# Patient Record
Sex: FEMALE | Race: OTHER | HISPANIC OR LATINO | Employment: OTHER | URBAN - METROPOLITAN AREA
[De-identification: names, ages, dates, MRNs, and addresses within clinical notes are randomized per-mention and may not be internally consistent; named-entity substitution may affect disease eponyms.]

---

## 2024-09-26 ENCOUNTER — APPOINTMENT (OUTPATIENT)
Dept: PRIMARY CARE | Facility: CLINIC | Age: 74
End: 2024-09-26
Payer: COMMERCIAL

## 2024-11-07 ENCOUNTER — APPOINTMENT (OUTPATIENT)
Dept: RADIOLOGY | Facility: HOSPITAL | Age: 74
End: 2024-11-07
Payer: COMMERCIAL

## 2024-11-07 ENCOUNTER — APPOINTMENT (OUTPATIENT)
Dept: CARDIOLOGY | Facility: HOSPITAL | Age: 74
End: 2024-11-07
Payer: COMMERCIAL

## 2024-11-07 ENCOUNTER — HOSPITAL ENCOUNTER (INPATIENT)
Facility: HOSPITAL | Age: 74
End: 2024-11-07
Attending: EMERGENCY MEDICINE | Admitting: INTERNAL MEDICINE
Payer: COMMERCIAL

## 2024-11-07 DIAGNOSIS — F03.90 DEMENTIA, UNSPECIFIED DEMENTIA SEVERITY, UNSPECIFIED DEMENTIA TYPE, UNSPECIFIED WHETHER BEHAVIORAL, PSYCHOTIC, OR MOOD DISTURBANCE OR ANXIETY (MULTI): ICD-10-CM

## 2024-11-07 DIAGNOSIS — E83.52 HYPERCALCEMIA: ICD-10-CM

## 2024-11-07 DIAGNOSIS — T83.511A URINARY TRACT INFECTION ASSOCIATED WITH INDWELLING URETHRAL CATHETER, INITIAL ENCOUNTER (CMS-HCC): ICD-10-CM

## 2024-11-07 DIAGNOSIS — N17.9 AKI (ACUTE KIDNEY INJURY) (CMS-HCC): Primary | ICD-10-CM

## 2024-11-07 DIAGNOSIS — C80.1 MALIGNANCY (MULTI): ICD-10-CM

## 2024-11-07 DIAGNOSIS — N39.0 URINARY TRACT INFECTION ASSOCIATED WITH INDWELLING URETHRAL CATHETER, INITIAL ENCOUNTER (CMS-HCC): ICD-10-CM

## 2024-11-07 DIAGNOSIS — Z93.6 NEPHROSTOMY PRESENT: ICD-10-CM

## 2024-11-07 DIAGNOSIS — R62.7 FAILURE TO THRIVE IN ADULT: ICD-10-CM

## 2024-11-07 LAB
25(OH)D3 SERPL-MCNC: 16 NG/ML (ref 30–100)
25(OH)D3 SERPL-MCNC: 23 NG/ML (ref 30–100)
ABO GROUP (TYPE) IN BLOOD: NORMAL
ALBUMIN SERPL BCP-MCNC: 2.5 G/DL (ref 3.4–5)
ALP SERPL-CCNC: 102 U/L (ref 33–136)
ALT SERPL W P-5'-P-CCNC: 16 U/L (ref 7–45)
AMPHETAMINES UR QL SCN: NORMAL
ANION GAP SERPL CALC-SCNC: 12 MMOL/L (ref 10–20)
ANTIBODY SCREEN: NORMAL
APPEARANCE UR: ABNORMAL
AST SERPL W P-5'-P-CCNC: 26 U/L (ref 9–39)
BACTERIA #/AREA URNS AUTO: ABNORMAL /HPF
BARBITURATES UR QL SCN: NORMAL
BASOPHILS # BLD AUTO: 0.08 X10*3/UL (ref 0–0.1)
BASOPHILS NFR BLD AUTO: 0.4 %
BENZODIAZ UR QL SCN: NORMAL
BILIRUB SERPL-MCNC: 0.2 MG/DL (ref 0–1.2)
BILIRUB UR STRIP.AUTO-MCNC: NEGATIVE MG/DL
BUN SERPL-MCNC: 65 MG/DL (ref 6–23)
BZE UR QL SCN: NORMAL
CA-I BLD-SCNC: 1.82 MMOL/L (ref 1.1–1.33)
CALCIUM SERPL-MCNC: 13.6 MG/DL (ref 8.6–10.6)
CANNABINOIDS UR QL SCN: NORMAL
CHLORIDE SERPL-SCNC: 102 MMOL/L (ref 98–107)
CHLORIDE UR-SCNC: 25 MMOL/L
CHLORIDE/CREATININE (MMOL/G) IN URINE: 64 MMOL/G CREAT (ref 38–318)
CO2 SERPL-SCNC: 29 MMOL/L (ref 21–32)
COLOR UR: ABNORMAL
CREAT SERPL-MCNC: 3.02 MG/DL (ref 0.5–1.05)
CREAT UR-MCNC: 39.3 MG/DL (ref 20–320)
EGFRCR SERPLBLD CKD-EPI 2021: 16 ML/MIN/1.73M*2
EOSINOPHIL # BLD AUTO: 0.73 X10*3/UL (ref 0–0.4)
EOSINOPHIL NFR BLD AUTO: 3.5 %
ERYTHROCYTE [DISTWIDTH] IN BLOOD BY AUTOMATED COUNT: 16.3 % (ref 11.5–14.5)
FENTANYL+NORFENTANYL UR QL SCN: NORMAL
GLUCOSE SERPL-MCNC: 162 MG/DL (ref 74–99)
GLUCOSE UR STRIP.AUTO-MCNC: NORMAL MG/DL
HCT VFR BLD AUTO: 29.1 % (ref 36–46)
HGB BLD-MCNC: 9.5 G/DL (ref 12–16)
HIV 1+2 AB+HIV1 P24 AG SERPL QL IA: NONREACTIVE
IMM GRANULOCYTES # BLD AUTO: 0.12 X10*3/UL (ref 0–0.5)
IMM GRANULOCYTES NFR BLD AUTO: 0.6 % (ref 0–0.9)
KETONES UR STRIP.AUTO-MCNC: NEGATIVE MG/DL
LEUKOCYTE ESTERASE UR QL STRIP.AUTO: ABNORMAL
LYMPHOCYTES # BLD AUTO: 3.35 X10*3/UL (ref 0.8–3)
LYMPHOCYTES NFR BLD AUTO: 16 %
MCH RBC QN AUTO: 31.1 PG (ref 26–34)
MCHC RBC AUTO-ENTMCNC: 32.6 G/DL (ref 32–36)
MCV RBC AUTO: 95 FL (ref 80–100)
METHADONE UR QL SCN: NORMAL
MONOCYTES # BLD AUTO: 1.62 X10*3/UL (ref 0.05–0.8)
MONOCYTES NFR BLD AUTO: 7.7 %
MUCOUS THREADS #/AREA URNS AUTO: ABNORMAL /LPF
NEUTROPHILS # BLD AUTO: 15.03 X10*3/UL (ref 1.6–5.5)
NEUTROPHILS NFR BLD AUTO: 71.8 %
NITRITE UR QL STRIP.AUTO: NEGATIVE
NRBC BLD-RTO: 0 /100 WBCS (ref 0–0)
OPIATES UR QL SCN: NORMAL
OXYCODONE+OXYMORPHONE UR QL SCN: NORMAL
PCP UR QL SCN: NORMAL
PH UR STRIP.AUTO: 7 [PH]
PLATELET # BLD AUTO: 336 X10*3/UL (ref 150–450)
POTASSIUM SERPL-SCNC: 4.6 MMOL/L (ref 3.5–5.3)
POTASSIUM UR-SCNC: 28 MMOL/L
POTASSIUM/CREAT UR-RTO: 71 MMOL/G CREAT
PROT SERPL-MCNC: 6.4 G/DL (ref 6.4–8.2)
PROT UR STRIP.AUTO-MCNC: ABNORMAL MG/DL
RBC # BLD AUTO: 3.05 X10*6/UL (ref 4–5.2)
RBC # UR STRIP.AUTO: ABNORMAL /UL
RBC #/AREA URNS AUTO: >20 /HPF
RH FACTOR (ANTIGEN D): NORMAL
SODIUM SERPL-SCNC: 138 MMOL/L (ref 136–145)
SODIUM UR-SCNC: 29 MMOL/L
SODIUM/CREAT UR-RTO: 74 MMOL/G CREAT
SP GR UR STRIP.AUTO: 1.01
SQUAMOUS #/AREA URNS AUTO: ABNORMAL /HPF
T4 FREE SERPL-MCNC: 1.3 NG/DL (ref 0.78–1.48)
TREPONEMA PALLIDUM IGG+IGM AB [PRESENCE] IN SERUM OR PLASMA BY IMMUNOASSAY: NONREACTIVE
TSH SERPL-ACNC: 5.84 MIU/L (ref 0.44–3.98)
UROBILINOGEN UR STRIP.AUTO-MCNC: NORMAL MG/DL
VIT B12 SERPL-MCNC: 425 PG/ML (ref 211–911)
WBC # BLD AUTO: 20.9 X10*3/UL (ref 4.4–11.3)
WBC #/AREA URNS AUTO: >50 /HPF
WBC CLUMPS #/AREA URNS AUTO: ABNORMAL /HPF

## 2024-11-07 PROCEDURE — 74176 CT ABD & PELVIS W/O CONTRAST: CPT

## 2024-11-07 PROCEDURE — 1210000001 HC SEMI-PRIVATE ROOM DAILY

## 2024-11-07 PROCEDURE — 82436 ASSAY OF URINE CHLORIDE: CPT

## 2024-11-07 PROCEDURE — 82306 VITAMIN D 25 HYDROXY: CPT | Performed by: NUTRITIONIST

## 2024-11-07 PROCEDURE — 93005 ELECTROCARDIOGRAM TRACING: CPT

## 2024-11-07 PROCEDURE — 93010 ELECTROCARDIOGRAM REPORT: CPT | Performed by: EMERGENCY MEDICINE

## 2024-11-07 PROCEDURE — 51702 INSERT TEMP BLADDER CATH: CPT

## 2024-11-07 PROCEDURE — 82306 VITAMIN D 25 HYDROXY: CPT

## 2024-11-07 PROCEDURE — 82397 CHEMILUMINESCENT ASSAY: CPT

## 2024-11-07 PROCEDURE — 2500000004 HC RX 250 GENERAL PHARMACY W/ HCPCS (ALT 636 FOR OP/ED)

## 2024-11-07 PROCEDURE — 99285 EMERGENCY DEPT VISIT HI MDM: CPT | Mod: 25

## 2024-11-07 PROCEDURE — 72125 CT NECK SPINE W/O DYE: CPT

## 2024-11-07 PROCEDURE — 70450 CT HEAD/BRAIN W/O DYE: CPT

## 2024-11-07 PROCEDURE — 2500000004 HC RX 250 GENERAL PHARMACY W/ HCPCS (ALT 636 FOR OP/ED): Performed by: NUTRITIONIST

## 2024-11-07 PROCEDURE — 36415 COLL VENOUS BLD VENIPUNCTURE: CPT

## 2024-11-07 PROCEDURE — 72125 CT NECK SPINE W/O DYE: CPT | Performed by: RADIOLOGY

## 2024-11-07 PROCEDURE — 96360 HYDRATION IV INFUSION INIT: CPT | Mod: 59

## 2024-11-07 PROCEDURE — 87389 HIV-1 AG W/HIV-1&-2 AB AG IA: CPT | Performed by: NUTRITIONIST

## 2024-11-07 PROCEDURE — 84443 ASSAY THYROID STIM HORMONE: CPT | Performed by: NUTRITIONIST

## 2024-11-07 PROCEDURE — 82607 VITAMIN B-12: CPT | Performed by: NUTRITIONIST

## 2024-11-07 PROCEDURE — 84439 ASSAY OF FREE THYROXINE: CPT | Performed by: NUTRITIONIST

## 2024-11-07 PROCEDURE — 81001 URINALYSIS AUTO W/SCOPE: CPT

## 2024-11-07 PROCEDURE — 84075 ASSAY ALKALINE PHOSPHATASE: CPT

## 2024-11-07 PROCEDURE — 86780 TREPONEMA PALLIDUM: CPT | Performed by: NUTRITIONIST

## 2024-11-07 PROCEDURE — 74176 CT ABD & PELVIS W/O CONTRAST: CPT | Performed by: RADIOLOGY

## 2024-11-07 PROCEDURE — 87086 URINE CULTURE/COLONY COUNT: CPT

## 2024-11-07 PROCEDURE — 85025 COMPLETE CBC W/AUTO DIFF WBC: CPT

## 2024-11-07 PROCEDURE — 96361 HYDRATE IV INFUSION ADD-ON: CPT | Mod: 59

## 2024-11-07 PROCEDURE — 80307 DRUG TEST PRSMV CHEM ANLYZR: CPT | Performed by: NUTRITIONIST

## 2024-11-07 PROCEDURE — 86901 BLOOD TYPING SEROLOGIC RH(D): CPT

## 2024-11-07 PROCEDURE — 36415 COLL VENOUS BLD VENIPUNCTURE: CPT | Performed by: EMERGENCY MEDICINE

## 2024-11-07 PROCEDURE — 70450 CT HEAD/BRAIN W/O DYE: CPT | Performed by: RADIOLOGY

## 2024-11-07 PROCEDURE — 84134 ASSAY OF PREALBUMIN: CPT | Performed by: NUTRITIONIST

## 2024-11-07 PROCEDURE — 87040 BLOOD CULTURE FOR BACTERIA: CPT | Performed by: EMERGENCY MEDICINE

## 2024-11-07 PROCEDURE — 76775 US EXAM ABDO BACK WALL LIM: CPT | Performed by: EMERGENCY MEDICINE

## 2024-11-07 PROCEDURE — 99285 EMERGENCY DEPT VISIT HI MDM: CPT | Performed by: EMERGENCY MEDICINE

## 2024-11-07 PROCEDURE — 76857 US EXAM PELVIC LIMITED: CPT

## 2024-11-07 PROCEDURE — 82330 ASSAY OF CALCIUM: CPT

## 2024-11-07 PROCEDURE — 76857 US EXAM PELVIC LIMITED: CPT | Performed by: EMERGENCY MEDICINE

## 2024-11-07 PROCEDURE — 82746 ASSAY OF FOLIC ACID SERUM: CPT | Performed by: NUTRITIONIST

## 2024-11-07 RX ORDER — PANTOPRAZOLE SODIUM 40 MG/1
40 TABLET, DELAYED RELEASE ORAL DAILY
Status: ON HOLD | COMMUNITY

## 2024-11-07 RX ORDER — ACETAMINOPHEN 325 MG/1
650 TABLET ORAL EVERY 4 HOURS PRN
Status: ON HOLD | COMMUNITY

## 2024-11-07 RX ORDER — BISACODYL 10 MG/1
10 SUPPOSITORY RECTAL DAILY PRN
Status: ACTIVE | OUTPATIENT
Start: 2024-11-07

## 2024-11-07 RX ORDER — TROSPIUM CHLORIDE 20 MG/1
20 TABLET, FILM COATED ORAL DAILY
Status: ON HOLD | COMMUNITY

## 2024-11-07 RX ORDER — CIPROFLOXACIN 500 MG/1
500 TABLET ORAL EVERY 12 HOURS
Status: ON HOLD | COMMUNITY
Start: 2024-11-04 | End: 2024-11-11

## 2024-11-07 RX ORDER — POLYETHYLENE GLYCOL 3350 17 G/17G
17 POWDER, FOR SOLUTION ORAL DAILY PRN
Status: ACTIVE | OUTPATIENT
Start: 2024-11-07

## 2024-11-07 RX ORDER — METOPROLOL TARTRATE 25 MG/1
25 TABLET, FILM COATED ORAL EVERY 12 HOURS
Status: ON HOLD | COMMUNITY

## 2024-11-07 RX ORDER — SODIUM CHLORIDE 9 MG/ML
150 INJECTION, SOLUTION INTRAVENOUS CONTINUOUS
Status: ACTIVE | OUTPATIENT
Start: 2024-11-07 | End: 2024-11-08

## 2024-11-07 RX ORDER — DOCUSATE SODIUM 100 MG/1
100 CAPSULE, LIQUID FILLED ORAL 2 TIMES DAILY
Status: ON HOLD | COMMUNITY

## 2024-11-07 RX ORDER — MIRTAZAPINE 7.5 MG/1
7.5 TABLET, FILM COATED ORAL DAILY
Status: ON HOLD | COMMUNITY

## 2024-11-07 RX ORDER — CYANOCOBALAMIN (VITAMIN B-12) 500 MCG
1 TABLET ORAL NIGHTLY
Status: ON HOLD | COMMUNITY

## 2024-11-07 RX ORDER — CEFTRIAXONE 1 G/50ML
1 INJECTION, SOLUTION INTRAVENOUS ONCE
Status: COMPLETED | OUTPATIENT
Start: 2024-11-07 | End: 2024-11-07

## 2024-11-07 RX ORDER — HEPARIN SODIUM 5000 [USP'U]/ML
5000 INJECTION, SOLUTION INTRAVENOUS; SUBCUTANEOUS EVERY 8 HOURS
Status: DISPENSED | OUTPATIENT
Start: 2024-11-07

## 2024-11-07 RX ORDER — CEFTRIAXONE 1 G/50ML
1 INJECTION, SOLUTION INTRAVENOUS EVERY 24 HOURS
Status: DISCONTINUED | OUTPATIENT
Start: 2024-11-08 | End: 2024-11-08

## 2024-11-07 ASSESSMENT — LIFESTYLE VARIABLES
HAVE PEOPLE ANNOYED YOU BY CRITICIZING YOUR DRINKING: NO
HAVE YOU EVER FELT YOU SHOULD CUT DOWN ON YOUR DRINKING: NO
EVER HAD A DRINK FIRST THING IN THE MORNING TO STEADY YOUR NERVES TO GET RID OF A HANGOVER: NO
TOTAL SCORE: 0
EVER FELT BAD OR GUILTY ABOUT YOUR DRINKING: NO

## 2024-11-07 ASSESSMENT — ENCOUNTER SYMPTOMS: CHILLS: 1

## 2024-11-07 ASSESSMENT — COLUMBIA-SUICIDE SEVERITY RATING SCALE - C-SSRS
6. HAVE YOU EVER DONE ANYTHING, STARTED TO DO ANYTHING, OR PREPARED TO DO ANYTHING TO END YOUR LIFE?: NO
2. HAVE YOU ACTUALLY HAD ANY THOUGHTS OF KILLING YOURSELF?: NO
1. IN THE PAST MONTH, HAVE YOU WISHED YOU WERE DEAD OR WISHED YOU COULD GO TO SLEEP AND NOT WAKE UP?: NO

## 2024-11-07 ASSESSMENT — PAIN - FUNCTIONAL ASSESSMENT: PAIN_FUNCTIONAL_ASSESSMENT: UNABLE TO SELF-REPORT

## 2024-11-07 NOTE — ED PROVIDER NOTES
HPI   Chief Complaint   Patient presents with    Fall       Patient is a 74-year-old female with past medical history of cognitive decline, recurrent falls, walking difficulty, esophagitis, diaphragmatic hernia, prior hydronephrosis, prior FADIA presenting from [facility] with concern for ground-level fall with head strike.  Witnessed by nursing staff.  Patient unable to contribute significantly to history but does answer some questions with nods and able to tell me her name.  Per nurse and nursing facility this is at baseline.  Reports that at baseline she usually can tell you her name but is confused about most other things.  Nursing facility does not report any other concerns.  Report that patient is not on any blood thinners.            Patient History   No past medical history on file.  Past Surgical History:   Procedure Laterality Date    CHOLECYSTECTOMY  06/14/2016    Cholecystectomy Laparoscopic    OOPHORECTOMY  06/14/2016    Oophorectomy - Unilateral (Removal Of One Ovary)     No family history on file.  Social History     Tobacco Use    Smoking status: Not on file    Smokeless tobacco: Not on file   Substance Use Topics    Alcohol use: Not on file    Drug use: Not on file       Physical Exam   ED Triage Vitals [11/07/24 1206]   Temperature Heart Rate Respirations BP   36.2 °C (97.2 °F) 82 16 113/74      SpO2 Temp src Heart Rate Source Patient Position   -- -- -- Sitting      BP Location FiO2 (%)     Right arm --       Physical Exam  Constitutional:       Appearance: Normal appearance.   HENT:      Head: Normocephalic and atraumatic.   Eyes:      Extraocular Movements: Extraocular movements intact.   Cardiovascular:      Rate and Rhythm: Normal rate.   Pulmonary:      Effort: Pulmonary effort is normal.   Abdominal:      Comments: PEG tube in place, soft, nondistended, no tenderness to palpation.   Genitourinary:     Comments: Cloudy urine from Calvillo catheter.  Musculoskeletal:         General: Normal  range of motion.      Cervical back: Normal range of motion.   Skin:     General: Skin is warm and dry.   Neurological:      Mental Status: She is alert.      Comments: Eyes track.  Follows commands when asked to give thumbs up and wiggle toes.  Responds to stimuli in upper and lower extremities.  Alert and oriented to name.  Unable to coherently understand what patient says for location.  Unable to give history of recent events.  Does nod when asked if she fell but somewhat inconsistently answers other questions with nods.   Psychiatric:         Mood and Affect: Mood normal.         Behavior: Behavior normal.           ED Course & [facility]   ED Course as of 11/08/24 0847   Thu Nov 07, 2024   1721 External records reviewed including notes from [facility] on 10/8/2024.  Placement of right sided percutaneous nephrostomy for obstructive uropathy. []   2020 Electrocardiogram, 12-lead PRN ACS symptoms  Interpreted by me.  11/7/2024 at 2002.  Sinus rhythm 83 bpm.  , QRS 80, QTc 413.  No ST elevation. []      ED Course User Index  [MC] Bertrand Gibson MD         Diagnoses as of 11/08/24 0847   FADIA (acute kidney injury) (CMS-HCC)   Urinary tract infection associated with indwelling urethral catheter, initial encounter (CMS-HCC)   Hypercalcemia                 No data recorded     Parish Coma Scale Score: 14 (11/07/24 1207 : Rosa Knight, MARIIA)                           Medical Decision Making  Patient is a 74-year-old female with past medical history of cognitive decline, recurrent falls, walking difficulty, esophagitis, diaphragmatic hernia, prior hydronephrosis, prior FADIA presenting from SNF with concern for ground-level fall with head strike.  Mental status from nursing home reportedly near baseline but is charted previously as being alert and oriented x 3 which is markedly better than presentation today.  Did improved to alert and oriented x 2 on attending evaluation but was significantly lethargic and confused.  CT  head and C-spine obtained with no evidence of acute injury.  Patient otherwise with marked dehydrated appearance as well as cloudy appearance of Calvillo catheter. IV fluids felt warranted given dehydrated appearance and UA sent.  Labs most notable for marked FADIA and leukocytosis suspected secondary to dehydration and UTI.  Did have significant clots and poor urine output after placement of Calvillo.  Point-of-care ultrasound notable for bilateral hydronephrosis.  Case discussed with medicine and patient admitted for further management of FADIA, UTI, and hydronephrosis.      Patient seen and discussed with Dr. Yonas Anand MD, PhD  Emergency Medicine PGY3          Procedure  Procedures     Sharath Anand MD  Resident  11/08/24 9938

## 2024-11-07 NOTE — CONSULTS
Reason For Consult  FADIA with BL hydroureteronephrosis R>L s/p R PCNL placed 9/2024 at [facility].     History Of Present Illness  Wanda Booth is a 74 y.o. female presenting from facility for a trauma evaluation after a fall from ground level and her work up revealed FADIA (cr 3 from 0.8 BL in 2022) very thick bladder wall with pelvic lymphadenopathy (1.3-1.6cm) and BL hydroureteronephrosis R>L despite her current PCNT that was in place on imaging for which urology service was consulted.    She presented to [facility] in 9/2024 with FTT and was admitted to ICU for pancreatitis, esophagitis on endoscopy s/p PEG tube and FADIA. Her CT at the time with similar findings except for L side hydroureteronephrosis, it was reported mild. Had carreon placement with persistent hydro on US afterward s/p R PCNT. She was discharged to SNF with outpatient follow up with [facility] urology 11/12 for cystoscopy.     She was not able to follow conversation and was sleepy. She was not able to answer any questions. Her R PCNT was not draining at the time and it start draining after flushing the tube gently.      Past Medical History  She has no past medical history on file.    Surgical History  She has a past surgical history that includes Oophorectomy (06/14/2016) and Cholecystectomy (06/14/2016).     Social History  She has no history on file for tobacco use, alcohol use, and drug use.    Family History  No family history on file.     Allergies  Patient has no known allergies.    Review of Systems  Reviewed       Physical Exam  General: in no acute distress, non-toxic appearing   : carreon in place draining light pink urine  Respiratory: non labored breathing   Cardiovascular: regular rate per chart  Abdomen: soft, non-distended, non specific tenderness. Non draining R PCNT    Extremities: no cyanosis, clubbing or edema   Skin: no obvious lesions or rashes   Psych: appropriate mood and behavior       Last Recorded Vitals  Blood pressure (!) 140/91, pulse 98,  "temperature 36.7 °C (98.1 °F), resp. rate 16, height 1.473 m (4' 10\"), weight 72.6 kg (160 lb), SpO2 99%.    Relevant Results  CT abdomen pelvis wo IV contrast  11/7/2024   1. Severe right and moderate left hydroureteronephrosis.   2. Right percutaneous nephrostomy tube is in expected positioning with pigtail in the right renal pelvis without evidence of discontinuity or kinking.  3. Marked hazy thickening of the urinary bladder wall with extensive perivesical nodularity highly suspicious for malignancy with suspected extramural extension. Alternatively, chronic severe inflammatory process can also be considered.  3. Few solid and few cavitary nodules throughout the bilateral lung bases may relate to malignancy or atypical infectious process.  4. Diffuse haziness of the pancreas without collection, mass, or ductal dilatation, which may relate to benign edema, however correlation with lipase levels is recommended if there is concern for acute pancreatitis.  5. Multiple enlarged pelvic and lower paraesophageal lymph nodes are presumed metastatic or less likely reactive.  6. Small left and trace right pleural effusions.  7. PEG tube in place with bumper in the gastric body.    Assessment/Plan   Wanda Booth is a 74 y.o. female presenting from facility for a trauma evaluation after a fall from ground level and her work up revealed FADIA (cr 3 from 0.8 BL in 2022) very thick bladder wall with pelvic lymphadenopathy (1.3-1.6cm) and BL hydroureteronephrosis R>L despite her current PCNT (placed 9/2024) that was in place on imaging for which urology service was consulted.    Her R PCNT was flushed and start draining likely it was clogged. UA suspicious for UTI.    She had hypercalcemia in September with elevated PTH Related Peptide. Her hypercalcemia was resolved before discharge. She is now hypercalcemic.    Recommendations:  - Maintain R PCNT and carreon catheter  - Please ensure her carreon catheter was recently exchanged or " replace her carreon  - Agree with abx and follow up UC  - Trend cr and UOP  - If cr is not improving, would recommend IR L PCNT placement with possible pelvic LN biopsy at the time.  - Would recommend CTU (with clamped PCNT) for upper tract evaluation once renal function improve or MRU.  - Send urine cytology  - Urology will follow     Discussed with chief resident Dr. Grant and attending Dr. Deirdre Barrientos MD  Urology PGY-3  Pager: [phone]

## 2024-11-07 NOTE — H&P
"History Of Present Illness  Wanda Booth is a 74 y.o. female PMHx b/l hydronephrosis (w/ r nephrostomy tube), chronic carreon, PEG, recently tx for FADIA/sepsis and completed course of cipro presenting to ED 11/7 with fall and concern for UTI. Pt came from facility.    In ED: pt found to have FADIA with cr 3.02, elevated BUN65, hyperCa of 13.6. Pt given 2L bolus IVF. Concern for urinary retention after carreon replaced 11/7(reported \"looking infected\") so bedside ultrasound done moderate bilateral hydro, therefore pending CT non-con. R sided nephrostomy tube with limited urine draining. UA and cx ordered/blood cx collected and pt started on CTX.     Labs/Imaging: LFTs wnl, Glucose 162 (A1c 9/2024 5.6), CTHead and CTcervical spine wnl no acute findings    Hx: recent admission to [facility] facility 9/27-10/10 for sepsis and sevre FADIA, found to have b/l hydronephrosis then and had a R nephrostomy tube placed. Discharged 10/10 after resolution. Pt on ciprofloxacin at her facility per ED.  Ca 12.4 PTH 17 CKD4 based on Cystatin C of 1.98, baseline Cr 1.1 Pre-alb 9     Social: Pt is  to  [place], pt does not have capacity so will need to contact nok to confirm code status, for now full code.       Past Medical History  She has no past medical history on file.    Surgical History  She has a past surgical history that includes Oophorectomy (06/14/2016) and Cholecystectomy (06/14/2016).      Family History  No family history on file.     Allergies  Patient has no known allergies.    Review of Systems   Constitutional:  Positive for chills.        Pt complaining of being very cold during exam and not wanting to lift blankets    Gastrointestinal:         Endorsing pain        Physical Exam  Constitutional:       Comments: Pt appearing cachetic and disheveled. Unable to answer most questions. Pt shivering throughout.    HENT:      Head: Normocephalic.      Comments: Laceration right temporal forehead with dried blood      " Mouth/Throat:      Mouth: Mucous membranes are dry.      Comments: Tongue with light brown and surrounding dark brown plaque. Few teeth only on the bottom row.   Eyes:      General: No scleral icterus.        Right eye: No discharge.         Left eye: No discharge.      Comments: Eyes closed for most of exam    Cardiovascular:      Rate and Rhythm: Normal rate and regular rhythm.      Heart sounds: No murmur heard.     No friction rub. No gallop.   Pulmonary:      Effort: No respiratory distress.      Breath sounds: No wheezing, rhonchi or rales.   Chest:      Chest wall: No tenderness.   Abdominal:      General: There is no distension.      Palpations: Abdomen is soft. There is no mass.      Tenderness: There is abdominal tenderness. There is guarding.      Comments: PEG   Musculoskeletal:         General: No tenderness or signs of injury.      Right lower leg: No edema.      Left lower leg: No edema.   Skin:     General: Skin is dry.      Coloration: Skin is not jaundiced.      Findings: No rash.   Neurological:      Mental Status: She is disoriented.      Comments: Alert and oriented to person only   Psychiatric:      Comments: Pt with no capacity           Last Recorded Vitals  /58   Pulse 81   Temp 36.4 °C (97.5 °F)   Resp 14   Wt 72.6 kg (160 lb)   SpO2 95%     Scheduled medications  cefTRIAXone, 1 g, intravenous, Once  heparin (porcine), 5,000 Units, subcutaneous, q8h      Continuous medications  sodium chloride 0.9%, 100 mL/hr      PRN medications  PRN medications: polyethylene glycol       Assessment/Plan:  Ms. Wanda Booth is a 73 yo woman with PMHx b/l hydronephrosis (w/ r nephrostomy tube), chronic carreon, PEG, recently tx for FADIA/sepsis at [facility] and completed course of cipro presenting to  ED 11/7 for fall and concern for UTI. Pt came from facility. In the ED pt found to have FADIA and hypercalcemia currently tx with fluids. Carreon replaced in ED and with concern for UTI blood cx and UA with cx  ordered. Concern for limited drainage from neph so urology consulted and pending CTAP non-con. Pt with AMS unsure if this is chronic or acute, ordered labs for further workup through CTH normal and CT cervical spine also wnl after fall. Pt started on CTX 11/7-xx. Will monitor for improvement in cr and ca with fluids and consider calcitonin or zoledronic acid.       #AMS vs #Hypercalcemic Encephalopathy   #FADIA  -Cr 3.02 baseline 1.1 and elevated BUN 65 in ED  #Hypercalcemia  -Ca of 13.6 in ED  #Chronic Carreon  #r Nephrostomy Tube  #B/L Hydronephrosis   -bedside ultrasound showed b/l hydro in ED 11/7  -Labs prior to admission Ca 12.4 PTH 17 CKD4 based on Cystatin C of 1.98, baseline Cr 1.1 Pre-alb 9   -received 2L NS in ED  Plan:  -Started normal saline at 100 mL/hr  -Consulted Urology    -pending ionized ca, pth, pthrp consider zolendronic acid and calcitonin, urine electrolytes  -pending utox, UTI workup below, HIV. Syph. TSH with reflex, b12  -vit d low 16  -pending CTAP non-con    #Concern for UTI  #Leukocytosis   -carreon changed in ED 11/7  Plan:  -UA and cx, blood cx collected 11/7  -wbc 20.9  -On CTX 11/7-xx    #PEG  #Concern for malnutrition/neglect  #Poor dentition  #Normocytic Anemia  Plan:  -hgb 9.5, mcv 95  -dietician consulted, hold on starting iron iso infection ears malnourished, appreciate recs from dietician for tube feeds     #Unspecified Fall  -CTHead and CTcervical spine wnl no acute findings  Plan:  -ctm      F NS 100ml/hr  E prn  N NPO and nothing via tube  A PIV, PEG, Carreon, Neph tube   DVT ppx subcutaneous hep  Bowel reg miralax prn    Code Status: FULL CODE (pt with/o capacity pending conversation with NOK)  TANK Booth () home  (need to establish contact)       Mariana Robertson MD

## 2024-11-07 NOTE — ED TRIAGE NOTES
According to EMS, patient fell at facility where she lives. Patient answers questions with nodding yes/no, unable to verbally recount what happened. Does nod when asked if she fell.

## 2024-11-07 NOTE — SIGNIFICANT EVENT
SENIOR STAFFING NOTE    Please refer to excellent PGY-1 history and physical note for further details.    Wanda Booth is a 74 female presenting after a fall from her nursing facility. Found to have significant FADIA and c/f UTI, for which she is admitted.    She has about a 2 year gap in her medical history from 2022 until 9/2024 at which time she was admitted to a [facility] facility for sepsis 2/2 UTI, FADIA, pancreatitis, and hypercalcemia. During this admission, she was hypercalcemic to 12.3 PTH 17, PTHrP 35.2, suspected malignancy especially iso cystitis though unclear extent of workup. She was treated with IVF and her Ca on discharge was 10.3. Regarding her FADIA, Cr on admission 2.14, discharged at 1.29. Nephrology was consulted and given the lack of medical history/data, FADIA vs CKD unclear. Cystatin C 1.98, dx with CKD4, felt to have a Cr baseline of 1.1. She was also found to have cystitis with b/l hydronephrosis, and underwent R sided nephrostomy tube placement, unclear regarding discussion of unilateral vs bilateral nephrostomy tubes. Also diagnosed with failure to thrive during this admission, Pre-albumin of 9 and underwent PEG placement. She was discharged with R PCNT and carreon with Urology follow up for cystoscopy.      Objective data:  On presentation, afebrile and HDS. Labs notable for leukocytosis 20.9, anemia 9.5 MCV 95, BUN/Cr 65/3.02, Ca 13.6 (corrected for albumin 14.8). CTH and CT C spine unremarkable for acute abnormalities. Carreon exchanged in the ED, started on CTX for empiric UTI coverage. POCUS by ED with b/l hydronephrosis, decompressed bladder. CT A/P ordered and admitted to medicine.     Constitutional: No acute distress, resting comfortably in bed, malnurished, dry  Cardiovascular: RRR, normal S1/S2, no murmurs noted  Pulmonary: Clear to auscultation b/l, poor inspiratory effort  GI: Soft, generalized tenderness to palpation, PEG tube in place  : Carreon draining pink tinged urine, R PCNT in  place  Lower extremities: Warm and well perfused, no lower extremity edema  Neuro: A&O to person only. Unable to maintain concentration to follow commands intermittently during conversation  Psych: Appropriate mood and affect    Assessment and Plan:  Currently presenting with what appears to be acute worsening of her previous presentation. She has a more severe FADIA on CKD4 iso b/l hydronephrosis with c/f malfunctioning R sided nephrostomy tube. Her hypercalcemia is more severe as well, 14.8 on presentation.     In the midst of her significant hypercalcemia, renal disease, malnutrition, and c/f underlying malignancy, she is at what appears to be baseline for her with being oriented only to person.       #FADIA on CKD4 (bl Cr 1.1)  #Subacute cystitis  #C/f UTI, leukocytosis  - She has significant FADIA on CKD iso severe b/l hydronephrosis s/p R PCNT 10/3/24. Now found to again have b/l hydronephrosis and c/f R PCNT malfunction  [] Urology consulted regarding b/l hydronephrosis and FADIA and c/f R PCNT malfunction. Would potentially need IR exchanged PCNT however need to determine whether solely exchange vs R PCNT exchange and L PCNT placement would be indicated, appreciate recs  [] UA, urine lytes  [] CTX vs Zosyn vs Rizwana for ESBL coverage   [] Fu CT A/P from ED    #Hypercalcemia  #Encephalopathy, ?underlying dementia  - Currently at her baseline mental status per chart review, A&Ox1, not following commands well. As there is concern she has underlying malignancy, and with Ca 14.8, PTH 17, PTHrP 35.2, in the setting of no medical data since 2022, question whether this is longstanding hypercalcemia leading to hypercalcemic encephalopathy. Alternatively, was recently admitted for sepsis 2/2 UTI and is at high risk for recurrent infections which could certainly lead to encephalopathy, though with unclear baseline past 9/2024 difficult to tell.   [] s/p 2L NS in ED, will continue with NS at 100cc/hr, reeval in AM  [] Will  consider zolendronic acid and calcitonin in AM pending repeat Ca  [] PTH, PTHrP, Vit D  [] Will need collateral information, at this time patient also does not demonstrate capacity to determine code status or medical care    #Failure to thrive  #Recurrent falls  - Malnourished, prealbumin 9/2024 9.0, now s/p PEG   - C/f underlying malignancy  - Falls likely 2/2 hypovolemia iso poor PO intake, potential sepsis  [] Nutrition consult  [] NPO for now given aspiration risk  [] Prealbumin      Fluids: NS at 100cc/hr  Electrolytes: K>4, Mg>2  Nutrition: NPO, c/f aspiration risk  Access: PIV  DVT ppx: SCDs, chem ppx on hold for potential procedures  GI ppx:  None indicated  Code status:  Will place as full code, however at this time patient does not have capacity to determine code status and proxy unable to be reached  NOK: Hema () [phone]       Patient to be staffed with attending in the AM.    Bertrnad Holbrook MD  Internal Medicine, PGY-2

## 2024-11-07 NOTE — ED PROCEDURE NOTE
Procedure    Performed by: Matt Mathur DO  Authorized by: Bertrand Gibson MD        Genitourinary Indications: retention of urine and hematuria          Procedure: Renal Ultrasound    Findings:  Right Kidney: The RIGHT kidney was visualized and was positive for HYDRONEPHROSIS.  Left Kidney: The LEFT kidney was visualized and was positive for HYDRONEPHROSIS.  Bladder: The bladder was visualized and was DECOMPRESSED. and Catheter in place    Impression:  Renal: abnormal renal ultrasound  Procedure: Bladder Ultrasound    Findings:  Bladder Visualization: The bladder was visualized and was DECOMPRESSED.    Impression:  Bladder: The bladder was DECOMPRESSED.    Comments: Calvillo catheter balloon in bladder. Percutaneous nephrostomy in right renal pelvis.      Matt Mathur DO  Resident  11/07/24 0443      I was present during all critical and key portions of the procedure(s) and immediately available to furnish services the entire duration.  See resident/ANTWON note for details.       Bertrand Gibson MD  11/07/24 1601

## 2024-11-08 ENCOUNTER — APPOINTMENT (OUTPATIENT)
Dept: RADIOLOGY | Facility: HOSPITAL | Age: 74
End: 2024-11-08
Payer: COMMERCIAL

## 2024-11-08 ENCOUNTER — APPOINTMENT (OUTPATIENT)
Dept: CARDIOLOGY | Facility: HOSPITAL | Age: 74
End: 2024-11-08
Payer: COMMERCIAL

## 2024-11-08 LAB
ALBUMIN SERPL BCP-MCNC: 2.3 G/DL (ref 3.4–5)
ALBUMIN SERPL BCP-MCNC: 2.5 G/DL (ref 3.4–5)
ALP SERPL-CCNC: 79 U/L (ref 33–136)
ALT SERPL W P-5'-P-CCNC: 13 U/L (ref 7–45)
ANION GAP SERPL CALC-SCNC: 12 MMOL/L (ref 10–20)
ANION GAP SERPL CALC-SCNC: 15 MMOL/L (ref 10–20)
AST SERPL W P-5'-P-CCNC: 21 U/L (ref 9–39)
ATRIAL RATE: 83 BPM
BASOPHILS # BLD AUTO: 0.13 X10*3/UL (ref 0–0.1)
BASOPHILS NFR BLD AUTO: 0.7 %
BILIRUB DIRECT SERPL-MCNC: 0.2 MG/DL (ref 0–0.3)
BILIRUB SERPL-MCNC: 0.2 MG/DL (ref 0–1.2)
BUN SERPL-MCNC: 48 MG/DL (ref 6–23)
BUN SERPL-MCNC: 53 MG/DL (ref 6–23)
CALCIUM SERPL-MCNC: 12.8 MG/DL (ref 8.6–10.6)
CALCIUM SERPL-MCNC: 12.9 MG/DL (ref 8.6–10.6)
CHLORIDE SERPL-SCNC: 115 MMOL/L (ref 98–107)
CHLORIDE SERPL-SCNC: 118 MMOL/L (ref 98–107)
CO2 SERPL-SCNC: 19 MMOL/L (ref 21–32)
CO2 SERPL-SCNC: 22 MMOL/L (ref 21–32)
CREAT SERPL-MCNC: 2.53 MG/DL (ref 0.5–1.05)
CREAT SERPL-MCNC: 2.81 MG/DL (ref 0.5–1.05)
EGFRCR SERPLBLD CKD-EPI 2021: 17 ML/MIN/1.73M*2
EGFRCR SERPLBLD CKD-EPI 2021: 19 ML/MIN/1.73M*2
EOSINOPHIL # BLD AUTO: 1.34 X10*3/UL (ref 0–0.4)
EOSINOPHIL NFR BLD AUTO: 7 %
ERYTHROCYTE [DISTWIDTH] IN BLOOD BY AUTOMATED COUNT: 17.3 % (ref 11.5–14.5)
FOLATE SERPL-MCNC: 5.6 NG/ML
GLUCOSE BLD MANUAL STRIP-MCNC: 75 MG/DL (ref 74–99)
GLUCOSE BLD MANUAL STRIP-MCNC: 82 MG/DL (ref 74–99)
GLUCOSE BLD MANUAL STRIP-MCNC: 93 MG/DL (ref 74–99)
GLUCOSE SERPL-MCNC: 76 MG/DL (ref 74–99)
GLUCOSE SERPL-MCNC: 81 MG/DL (ref 74–99)
HCT VFR BLD AUTO: 33.1 % (ref 36–46)
HGB BLD-MCNC: 9.4 G/DL (ref 12–16)
HOLD SPECIMEN: NORMAL
IMM GRANULOCYTES # BLD AUTO: 0.11 X10*3/UL (ref 0–0.5)
IMM GRANULOCYTES NFR BLD AUTO: 0.6 % (ref 0–0.9)
INR PPP: 1.1 (ref 0.9–1.1)
LYMPHOCYTES # BLD AUTO: 3.85 X10*3/UL (ref 0.8–3)
LYMPHOCYTES NFR BLD AUTO: 20 %
MAGNESIUM SERPL-MCNC: 2.35 MG/DL (ref 1.6–2.4)
MAGNESIUM SERPL-MCNC: 2.39 MG/DL (ref 1.6–2.4)
MCH RBC QN AUTO: 31.4 PG (ref 26–34)
MCHC RBC AUTO-ENTMCNC: 28.4 G/DL (ref 32–36)
MCV RBC AUTO: 111 FL (ref 80–100)
MONOCYTES # BLD AUTO: 1.49 X10*3/UL (ref 0.05–0.8)
MONOCYTES NFR BLD AUTO: 7.7 %
NEUTROPHILS # BLD AUTO: 12.36 X10*3/UL (ref 1.6–5.5)
NEUTROPHILS NFR BLD AUTO: 64 %
NRBC BLD-RTO: 0 /100 WBCS (ref 0–0)
P AXIS: 74 DEGREES
P OFFSET: 182 MS
P ONSET: 137 MS
PHOSPHATE SERPL-MCNC: 4.1 MG/DL (ref 2.5–4.9)
PHOSPHATE SERPL-MCNC: 4.5 MG/DL (ref 2.5–4.9)
PLATELET # BLD AUTO: 271 X10*3/UL (ref 150–450)
POTASSIUM SERPL-SCNC: 4 MMOL/L (ref 3.5–5.3)
POTASSIUM SERPL-SCNC: 4 MMOL/L (ref 3.5–5.3)
PR INTERVAL: 156 MS
PREALB SERPL-MCNC: 13.9 MG/DL (ref 18–40)
PROT SERPL-MCNC: 6.1 G/DL (ref 6.4–8.2)
PROTHROMBIN TIME: 12.6 SECONDS (ref 9.8–12.8)
PTH-INTACT SERPL-MCNC: 18.7 PG/ML (ref 18.5–88)
Q ONSET: 215 MS
QRS COUNT: 14 BEATS
QRS DURATION: 80 MS
QT INTERVAL: 352 MS
QTC CALCULATION(BAZETT): 413 MS
QTC FREDERICIA: 392 MS
R AXIS: 12 DEGREES
RBC # BLD AUTO: 2.99 X10*6/UL (ref 4–5.2)
SODIUM SERPL-SCNC: 145 MMOL/L (ref 136–145)
SODIUM SERPL-SCNC: 148 MMOL/L (ref 136–145)
T AXIS: 65 DEGREES
T OFFSET: 391 MS
VENTRICULAR RATE: 83 BPM
WBC # BLD AUTO: 19.3 X10*3/UL (ref 4.4–11.3)

## 2024-11-08 PROCEDURE — 83735 ASSAY OF MAGNESIUM: CPT | Performed by: NUTRITIONIST

## 2024-11-08 PROCEDURE — 3E0G76Z INTRODUCTION OF NUTRITIONAL SUBSTANCE INTO UPPER GI, VIA NATURAL OR ARTIFICIAL OPENING: ICD-10-PCS | Performed by: INTERNAL MEDICINE

## 2024-11-08 PROCEDURE — 80069 RENAL FUNCTION PANEL: CPT | Mod: CCI | Performed by: NUTRITIONIST

## 2024-11-08 PROCEDURE — 2500000004 HC RX 250 GENERAL PHARMACY W/ HCPCS (ALT 636 FOR OP/ED)

## 2024-11-08 PROCEDURE — 2500000004 HC RX 250 GENERAL PHARMACY W/ HCPCS (ALT 636 FOR OP/ED): Performed by: STUDENT IN AN ORGANIZED HEALTH CARE EDUCATION/TRAINING PROGRAM

## 2024-11-08 PROCEDURE — 83970 ASSAY OF PARATHORMONE: CPT

## 2024-11-08 PROCEDURE — 97165 OT EVAL LOW COMPLEX 30 MIN: CPT | Mod: GO

## 2024-11-08 PROCEDURE — 76937 US GUIDE VASCULAR ACCESS: CPT | Performed by: STUDENT IN AN ORGANIZED HEALTH CARE EDUCATION/TRAINING PROGRAM

## 2024-11-08 PROCEDURE — 2500000004 HC RX 250 GENERAL PHARMACY W/ HCPCS (ALT 636 FOR OP/ED): Performed by: NUTRITIONIST

## 2024-11-08 PROCEDURE — 99153 MOD SED SAME PHYS/QHP EA: CPT | Performed by: STUDENT IN AN ORGANIZED HEALTH CARE EDUCATION/TRAINING PROGRAM

## 2024-11-08 PROCEDURE — 85610 PROTHROMBIN TIME: CPT

## 2024-11-08 PROCEDURE — 2550000001 HC RX 255 CONTRASTS: Performed by: INTERNAL MEDICINE

## 2024-11-08 PROCEDURE — C1769 GUIDE WIRE: HCPCS | Performed by: STUDENT IN AN ORGANIZED HEALTH CARE EDUCATION/TRAINING PROGRAM

## 2024-11-08 PROCEDURE — 88112 CYTOPATH CELL ENHANCE TECH: CPT | Mod: TC,MCY

## 2024-11-08 PROCEDURE — 99152 MOD SED SAME PHYS/QHP 5/>YRS: CPT | Performed by: STUDENT IN AN ORGANIZED HEALTH CARE EDUCATION/TRAINING PROGRAM

## 2024-11-08 PROCEDURE — 93005 ELECTROCARDIOGRAM TRACING: CPT

## 2024-11-08 PROCEDURE — 80051 ELECTROLYTE PANEL: CPT

## 2024-11-08 PROCEDURE — 36415 COLL VENOUS BLD VENIPUNCTURE: CPT

## 2024-11-08 PROCEDURE — C1729 CATH, DRAINAGE: HCPCS | Performed by: STUDENT IN AN ORGANIZED HEALTH CARE EDUCATION/TRAINING PROGRAM

## 2024-11-08 PROCEDURE — 88112 CYTOPATH CELL ENHANCE TECH: CPT | Performed by: PATHOLOGY

## 2024-11-08 PROCEDURE — 1200000002 HC GENERAL ROOM WITH TELEMETRY DAILY

## 2024-11-08 PROCEDURE — 84075 ASSAY ALKALINE PHOSPHATASE: CPT

## 2024-11-08 PROCEDURE — C1894 INTRO/SHEATH, NON-LASER: HCPCS | Performed by: STUDENT IN AN ORGANIZED HEALTH CARE EDUCATION/TRAINING PROGRAM

## 2024-11-08 PROCEDURE — 82947 ASSAY GLUCOSE BLOOD QUANT: CPT

## 2024-11-08 PROCEDURE — 0T9330Z DRAINAGE OF RIGHT KIDNEY PELVIS WITH DRAINAGE DEVICE, PERCUTANEOUS APPROACH: ICD-10-PCS | Performed by: STUDENT IN AN ORGANIZED HEALTH CARE EDUCATION/TRAINING PROGRAM

## 2024-11-08 PROCEDURE — 0T9430Z DRAINAGE OF LEFT KIDNEY PELVIS WITH DRAINAGE DEVICE, PERCUTANEOUS APPROACH: ICD-10-PCS | Performed by: STUDENT IN AN ORGANIZED HEALTH CARE EDUCATION/TRAINING PROGRAM

## 2024-11-08 PROCEDURE — 2720000007 HC OR 272 NO HCPCS: Performed by: STUDENT IN AN ORGANIZED HEALTH CARE EDUCATION/TRAINING PROGRAM

## 2024-11-08 PROCEDURE — 50432 PLMT NEPHROSTOMY CATHETER: CPT | Performed by: STUDENT IN AN ORGANIZED HEALTH CARE EDUCATION/TRAINING PROGRAM

## 2024-11-08 PROCEDURE — 50435 EXCHANGE NEPHROSTOMY CATH: CPT | Mod: 50 | Performed by: STUDENT IN AN ORGANIZED HEALTH CARE EDUCATION/TRAINING PROGRAM

## 2024-11-08 PROCEDURE — 93010 ELECTROCARDIOGRAM REPORT: CPT | Performed by: INTERNAL MEDICINE

## 2024-11-08 PROCEDURE — 83735 ASSAY OF MAGNESIUM: CPT

## 2024-11-08 PROCEDURE — 85025 COMPLETE CBC W/AUTO DIFF WBC: CPT

## 2024-11-08 PROCEDURE — 50435 EXCHANGE NEPHROSTOMY CATH: CPT | Performed by: STUDENT IN AN ORGANIZED HEALTH CARE EDUCATION/TRAINING PROGRAM

## 2024-11-08 PROCEDURE — 7100000009 HC PHASE TWO TIME - INITIAL BASE CHARGE: Performed by: STUDENT IN AN ORGANIZED HEALTH CARE EDUCATION/TRAINING PROGRAM

## 2024-11-08 PROCEDURE — 80069 RENAL FUNCTION PANEL: CPT

## 2024-11-08 PROCEDURE — 7100000010 HC PHASE TWO TIME - EACH INCREMENTAL 1 MINUTE: Performed by: STUDENT IN AN ORGANIZED HEALTH CARE EDUCATION/TRAINING PROGRAM

## 2024-11-08 PROCEDURE — 36415 COLL VENOUS BLD VENIPUNCTURE: CPT | Performed by: NUTRITIONIST

## 2024-11-08 PROCEDURE — 99223 1ST HOSP IP/OBS HIGH 75: CPT | Performed by: NUTRITIONIST

## 2024-11-08 RX ORDER — FENTANYL CITRATE 50 UG/ML
INJECTION, SOLUTION INTRAMUSCULAR; INTRAVENOUS
Status: COMPLETED | OUTPATIENT
Start: 2024-11-08 | End: 2024-11-08

## 2024-11-08 RX ORDER — SODIUM CHLORIDE 9 MG/ML
150 INJECTION, SOLUTION INTRAVENOUS CONTINUOUS
Status: DISCONTINUED | OUTPATIENT
Start: 2024-11-08 | End: 2024-11-09

## 2024-11-08 RX ORDER — MIDAZOLAM HYDROCHLORIDE 1 MG/ML
INJECTION INTRAMUSCULAR; INTRAVENOUS
Status: COMPLETED | OUTPATIENT
Start: 2024-11-08 | End: 2024-11-08

## 2024-11-08 ASSESSMENT — PAIN - FUNCTIONAL ASSESSMENT
PAIN_FUNCTIONAL_ASSESSMENT: 0-10
PAIN_FUNCTIONAL_ASSESSMENT: 0-10
PAIN_FUNCTIONAL_ASSESSMENT: PAINAD (PAIN ASSESSMENT IN ADVANCED DEMENTIA SCALE)

## 2024-11-08 ASSESSMENT — COGNITIVE AND FUNCTIONAL STATUS - GENERAL
DAILY ACTIVITIY SCORE: 7
DRESSING REGULAR UPPER BODY CLOTHING: TOTAL
TOILETING: TOTAL
PATIENT BASELINE BEDBOUND: YES
TURNING FROM BACK TO SIDE WHILE IN FLAT BAD: TOTAL
MOVING FROM LYING ON BACK TO SITTING ON SIDE OF FLAT BED WITH BEDRAILS: TOTAL
TOILETING: TOTAL
HELP NEEDED FOR BATHING: TOTAL
STANDING UP FROM CHAIR USING ARMS: TOTAL
DRESSING REGULAR LOWER BODY CLOTHING: TOTAL
DAILY ACTIVITIY SCORE: 7
TOILETING: TOTAL
DRESSING REGULAR LOWER BODY CLOTHING: TOTAL
EATING MEALS: A LOT
DRESSING REGULAR LOWER BODY CLOTHING: TOTAL
MOBILITY SCORE: 6
MOBILITY SCORE: 6
STANDING UP FROM CHAIR USING ARMS: TOTAL
PERSONAL GROOMING: A LOT
EATING MEALS: A LOT
PERSONAL GROOMING: TOTAL
HELP NEEDED FOR BATHING: TOTAL
MOVING FROM LYING ON BACK TO SITTING ON SIDE OF FLAT BED WITH BEDRAILS: TOTAL
MOVING TO AND FROM BED TO CHAIR: TOTAL
EATING MEALS: TOTAL
EATING MEALS: A LOT
TOILETING: TOTAL
MOVING TO AND FROM BED TO CHAIR: TOTAL
DRESSING REGULAR UPPER BODY CLOTHING: A LOT
MOVING FROM LYING ON BACK TO SITTING ON SIDE OF FLAT BED WITH BEDRAILS: TOTAL
HELP NEEDED FOR BATHING: A LOT
WALKING IN HOSPITAL ROOM: TOTAL
TURNING FROM BACK TO SIDE WHILE IN FLAT BAD: TOTAL
DAILY ACTIVITIY SCORE: 9
PERSONAL GROOMING: TOTAL
MOBILITY SCORE: 6
CLIMB 3 TO 5 STEPS WITH RAILING: TOTAL
DRESSING REGULAR UPPER BODY CLOTHING: TOTAL
HELP NEEDED FOR BATHING: TOTAL
DAILY ACTIVITIY SCORE: 7
DRESSING REGULAR LOWER BODY CLOTHING: TOTAL
CLIMB 3 TO 5 STEPS WITH RAILING: TOTAL
WALKING IN HOSPITAL ROOM: TOTAL
PERSONAL GROOMING: TOTAL
CLIMB 3 TO 5 STEPS WITH RAILING: TOTAL
DRESSING REGULAR UPPER BODY CLOTHING: TOTAL
STANDING UP FROM CHAIR USING ARMS: TOTAL
MOVING TO AND FROM BED TO CHAIR: TOTAL
WALKING IN HOSPITAL ROOM: TOTAL
MOVING FROM LYING ON BACK TO SITTING ON SIDE OF FLAT BED WITH BEDRAILS: TOTAL
TURNING FROM BACK TO SIDE WHILE IN FLAT BAD: TOTAL

## 2024-11-08 ASSESSMENT — PAIN SCALES - PAIN ASSESSMENT IN ADVANCED DEMENTIA (PAINAD)
TOTALSCORE: 0
BODYLANGUAGE: RELAXED
CONSOLABILITY: NO NEED TO CONSOLE
BREATHING: NORMAL
BREATHING: NORMAL
FACIALEXPRESSION: SMILING OR INEXPRESSIVE
FACIALEXPRESSION: SMILING OR INEXPRESSIVE
CONSOLABILITY: NO NEED TO CONSOLE
TOTALSCORE: 0
BODYLANGUAGE: RELAXED

## 2024-11-08 ASSESSMENT — ACTIVITIES OF DAILY LIVING (ADL): BATHING_ASSISTANCE: MAXIMAL

## 2024-11-08 ASSESSMENT — PAIN SCALES - GENERAL
PAINLEVEL_OUTOF10: 0 - NO PAIN
PAINLEVEL_OUTOF10: 0 - NO PAIN

## 2024-11-08 ASSESSMENT — PAIN SCALES - WONG BAKER: WONGBAKER_NUMERICALRESPONSE: NO HURT

## 2024-11-08 NOTE — NURSING NOTE
11/08/2024 @1400  Transfer    Patient was transferred over from [place] [id number].  Patient placed on telemetry. Vitals stable. BG check stable.  Four eyes skin check complete. Patient in bed with bed alarms and bed lowered. Call light is within reach.

## 2024-11-08 NOTE — PROGRESS NOTES
Occupational Therapy    Evaluation    Patient Name: Wanda Booth  MRN: [id number]  Department: [facility]  Room: 5066/5066-B  Today's Date: 11/8/2024  Time Calculation  Start Time: 1146  Stop Time: 1200  Time Calculation (min): 14 min    Assessment  IP OT Assessment  OT Assessment: difficulty I/ADLs, safety, fxnl mob  Prognosis: Fair  Barriers to Discharge:  (admitted from facility)  Evaluation/Treatment Tolerance: Patient limited by fatigue, Patient limited by pain  Medical Staff Made Aware: Yes  End of Session Communication: Bedside nurse  End of Session Patient Position: Alarm on, Bed, 4 rail up  Plan:  Treatment Interventions: ADL retraining, Functional transfer training, UE strengthening/ROM, Endurance training, Cognitive reorientation, Patient/family training, Compensatory technique education  OT Frequency: 2 times per week  OT Discharge Recommendations: Moderate intensity level of continued care  Equipment Recommended upon Discharge:  (hopstial bed, BSC, wheelchair)  OT Recommended Transfer Status: Dependent, Assist of 1  OT - OK to Discharge: Yes    Subjective   Current Problem:  1. FADIA (acute kidney injury) (CMS-HCC)        2. Urinary tract infection associated with indwelling urethral catheter, initial encounter (CMS-HCC)        3. Hypercalcemia          General:  General  Reason for Referral: fall from her nursing facility. Found to have significant FADIA and c/f UTI  Past Medical History Relevant to Rehab: [facility] facility for sepsis 2/2 UTI, FADIA, pancreatitis, and hypercalcemia. During this admission, she was hypercalcemic to 12.3 PTH 17, PTHrP 35.2, suspected malignancy especially iso cystitis though unclear extent of workup. She was treated with IVF and her Ca on discharge was 10.3. Regarding her FADIA, Cr on admission 2.14, discharged at 1.29. Nephrology was consulted and given the lack of medical history/data, FADIA vs CKD unclear. Cystatin C 1.98, dx with CKD4, felt to have a Cr baseline of 1.1. She was also  "found to have cystitis with b/l hydronephrosis, and underwent R sided nephrostomy tube placement, unclear regarding discussion of unilateral vs bilateral nephrostomy tubes. Also diagnosed with failure to thrive during this admission, Pre-albumin of 9 and underwent PEG placement. She was discharged with R PCNT and carreon with Urology follow up for cystoscopy.  Family/Caregiver Present: No  Prior to Session Communication: Bedside nurse  Patient Position Received: Bed, 4 rail up, Alarm on  General Comment: pleasantly confused  Precautions:  Medical Precautions: Fall precautions  Precautions Comment: nephrostomy tube    Vital Sign (Past 2hrs)        Date/Time Vitals Session Patient Position Pulse Resp SpO2 BP MAP (mmHg)    11/08/24 14:19:45 --  --  79  20  96 %  135/66  89                        Pain:  Pain Assessment  Pain Assessment: 0-10  0-10 (Numeric) Pain Score:  (unable to rate 2/2 cog with choices, wincing with mob holding abd)    Objective   Cognition:  Overall Cognitive Status: Impaired  Arousal/Alertness: Delayed responses to stimuli  Orientation Level:  (Oxself, bday, \"[facility]\")  Following Commands: Follows one step commands with repetition  Safety Judgment: Decreased awareness of need for assistance  Problem Solving: Assistance required to identify errors made  Memory: Exceptions to WFL  Long-Term Memory: Impaired  Short-Term Memory: Impaired  Working Memory: Impaired  Problem Solving: Exceptions to WFL  Complex Functional Tasks: Impaired  Simple Functional Tasks: Impaired  Insight: Severe  Impulsive: Mildly           Home Living:  Type of Home:  (admitted from facility reports lives with )  Home Adaptive Equipment:  (? historian unable to report if was transferred to wheelchair/chair at facility, per EMR 3 falls)   Prior Function:  Level of [place]: Needs assistance with ADLs, Needs assistance with homemaking, Needs assistance with functional transfers  Vocational: Retired  Leisure: happy with tv in " room, sleepy in [facility]  Hand Dominance: Right  IADL History:  IADL Comments: ? historian A IADLs and driving  ADL:  Eating Assistance: Total  Grooming Assistance:  (mod A anticipated supine)  Bathing Assistance: Maximal (anticipated)  UE Dressing Assistance: Maximal  LE Dressing Assistance: Total  Toileting Assistance with Device: Total  Functional Deficit: Steadying, Setup, Verbal cueing, Supervision/safety, Increased time to complete  Activity Tolerance:  Endurance: Tolerates less than 10 min exercise with changes in vital signs  Bed Mobility/Transfers: Bed Mobility  Bed Mobility:  (sup to sit depx1 1/2 way pt wincing in pain pointing to abd, att long sit dep to replace pillows pt wincing, declined further mob)    Transfers  Transfer: No         Sitting Balance:  Dynamic Sitting Balance  Dynamic Sitting-Level of Assistance:  (TBD)       IADL's:   IADL Comments: ? historian A IADLs and driving  Vision: Vision - Basic Assessment  Current Vision:  (eyes open to name no glasses present in session)  Sensation:  Light Touch:  (- n/t reported)  Strength:  Strength Comments: BUE shoulder flex 2+/5 distal 3/5    Coordination:  Movements are Fluid and Coordinated: No   Hand Function:  Hand Function  Gross Grasp: Functional  Extremities: RUE   RUE :  (shoulder flex AAROM ~0-60 degrees distal WFL AROM) and LUE   LUE:  (shoulder flex AAROM ~0-60 degrees, distal WFL)      Outcome Measures: [facility] Daily Activity  Putting on and taking off regular lower body clothing: Total  Bathing (including washing, rinsing, drying): A lot  Putting on and taking off regular upper body clothing: A lot  Toileting, which includes using toilet, bedpan or urinal: Total  Taking care of personal grooming such as brushing teeth: A lot  Eating Meals: Total  Daily Activity - Total Score: 9         and OT Adult Other Outcome Measures  4AT: +  Education Documentation  Body Mechanics, taught by Rosana Harmon, OT at 11/8/2024  3:07 PM.  Learner:  Patient  Readiness: Acceptance  Method: Explanation, Demonstration  Response: Needs Reinforcement  Comment: fxnl mob, positioning    Precautions, taught by Rosana Harmon, OT at 11/8/2024  3:07 PM.  Learner: Patient  Readiness: Acceptance  Method: Explanation, Demonstration  Response: Needs Reinforcement  Comment: fxnl mob, positioning    ADL Training, taught by Rosana Harmon, OT at 11/8/2024  3:07 PM.  Learner: Patient  Readiness: Acceptance  Method: Explanation, Demonstration  Response: Needs Reinforcement  Comment: fxnl mob, positioning    Education Comments  No comments found.      Goals:   Encounter Problems       Encounter Problems (Active)       ADLs       Patient will perform UB and LB bathing  with minimal assist  level of assistance and ae. (Progressing)       Start:  11/08/24    Expected End:  11/29/24            Patient with complete upper body dressing with minimal assist  level of assistance  (Progressing)       Start:  11/08/24    Expected End:  11/29/24            Patient with complete lower body dressing with moderate assist level of assistance donning and doffing all LE clothes  with PRN adaptive equipment  (Progressing)       Start:  11/08/24    Expected End:  11/29/24            Patient will feed self with minimal assist  level of assistance and verbal cues using PRN adaptive equipment. (Progressing)       Start:  11/08/24    Expected End:  11/29/24            Patient will complete daily grooming tasks  with minimal assist  level of assistance and PRN adaptive equipment. (Progressing)       Start:  11/08/24    Expected End:  11/29/24            Patient will complete toileting including hygiene clothing management/hygiene with moderate assist level of assistance and lrd. (Progressing)       Start:  11/08/24    Expected End:  11/29/24               COGNITION/SAFETY       Patient will follow >50% Simple commands to allow improved ADL performance. (Progressing)       Start:  11/08/24    Expected  End:  11/29/24               EXERCISE/STRENGTHENING       Patient with increase BUE to wfl strength. (Progressing)       Start:  11/08/24    Expected End:  11/29/24               MOBILITY       Patient will perform Functional mobility min Household distances/Community Distances with moderate assist level of assistance and least restrictive device in order to improve safety and functional mobility. (Progressing)       Start:  11/08/24    Expected End:  11/29/24               TRANSFERS       Patient will perform bed mobility moderate assist level of assistance and bed rails in order to improve safety and independence with mobility (Progressing)       Start:  11/08/24    Expected End:  11/29/24            Patient will complete functional transfer  least restrictive device with moderate assist level of assistance. (Progressing)       Start:  11/08/24    Expected End:  11/29/24

## 2024-11-08 NOTE — PROGRESS NOTES
Wanda Booth is a 74 y.o. female on day 0 of admission presenting with FADIA (acute kidney injury) (CMS-[facility]).    Patients son Colten Booth called [facility] regarding dc plan. Sw spoke with son Colten who is requesting discharge home with home care. He does not want patient to return to [facility] at time of discharge. Per son, plan was for patient to dc home with home care tomorrow from [facility]. Patient lives with spouse. Son is in [place]. Son is requesting patient discharge home with home care from [facility]. Patient fell x3 this week at [facility]. Home care was the plan from SNF. SNF was also to  provide education for spouse how to change her tube feed. SW informed son patient will not discharge from [facility] without coordination from family. Son stated plan is for patient to live with him in [place] long term. Pending RNF.    Dispo: care team to speak with spouse Hema Booth to assist with discharge planning. Sw attempted to call patient spouse. He did not answer.

## 2024-11-08 NOTE — CARE PLAN
The patient's goals for the shift include AME    The clinical goals for the shift include No falls, kidney function return to WNL

## 2024-11-08 NOTE — CONSULTS
"Nutrition Initial Assessment:   Nutrition Assessment    Reason for Assessment: Tube feeding assessment and order    Patient is a 74 y.o. female presenting with Fall from SNF  PMHx b/l hydronephrosis (w/ r nephrostomy tube), chronic carreon, PEG, recently tx for FADIA/sepsis     Nutrition History:  Energy Intake:  (NPO)  Food and Nutrient History: RDN received consult for tube feeding assessment. RDN attempted to interview patient during visit Pt lethargic, unable to obtain information, no family at bedside. Per chart review team is concerns for malnutrition, Pt has missing teeth, made NPO. Has hx of PEG  Food Allergies/Intolerances:  None  GI Symptoms: None  Oral Problems:  missing teeth      Enteral nutrition history   Last RDN note from 10/10/24 recommended:   Nutren 1.5 @ 40 mL/hr X 24 hours = 960 mL TV, 1440 kcal, 65 gm protein, 733 mL free water  FWF: 150 mL X 6 - adjust PRN based on hydration status  Enteral Access: PEG  Anthropometrics:  Height: 147.3 cm (4' 10\")   Weight: 53 kg (116 lb 13.5 oz)   BMI (Calculated): 24.43           Weight History:     Wt Readings from Last 20 Encounters:   11/08/24 53 kg (116 lb 13.5 oz)   09/13/22 77.6 kg (171 lb)   08/22/22 76.7 kg (169 lb)   04/27/22 77.1 kg (170 lb)   02/24/22 76.2 kg (168 lb)   08/26/21 77.1 kg (170 lb)    10/8/24 115#  Weight Change %:  Weight History / % Weight Change: Noting no significant weight changes over the last 2 months  Significant Weight Loss: No    Nutrition Focused Physical Exam Findings:    Subcutaneous Fat Loss:   Orbital Fat Pads: Severe (dark circles, hollowing and loose skin)  Buccal Fat Pads: Severe (hollow, sunken and narrow face)  Triceps: Severe (negligible fat tissue)  Muscle Wasting:  Temporalis: Severe (hollowed scooping depression)  Pectoralis (Clavicular Region): Severe (protruding prominent clavicle)  Deltoid/Trapezius: Severe (squared shoulders, acromion process prominent)  Quadriceps: Severe (depressions on inner and outer " "thigh)  Gastrocnemius: Severe (minimal muscle definition)  Edema:  Edema: none  Physical Findings:  Nails: Negative  Skin: Negative    Nutrition Significant Labs: Reviewed   CBC Trend:   Results from last 7 days   Lab Units 11/08/24  0612 11/07/24  1449   WBC AUTO x10*3/uL 19.3* 20.9*   RBC AUTO x10*6/uL 2.99* 3.05*   HEMOGLOBIN g/dL 9.4* 9.5*   HEMATOCRIT % 33.1* 29.1*   MCV fL 111* 95   PLATELETS AUTO x10*3/uL 271 336    , BMP Trend:   Results from last 7 days   Lab Units 11/08/24  0612 11/07/24  1449   GLUCOSE mg/dL 81 162*   CALCIUM mg/dL 12.8* 13.6*   SODIUM mmol/L 145 138   POTASSIUM mmol/L 4.0 4.6   CO2 mmol/L 22 29   CHLORIDE mmol/L 115* 102   BUN mg/dL 53* 65*   CREATININE mg/dL 2.81* 3.02*    , A1C:  Lab Results   Component Value Date    HGBA1C 5.6 09/25/2024   , BG POCT trend:   Results from last 7 days   Lab Units 11/08/24  0220   POCT GLUCOSE mg/dL 93    , Renal Lab Trend:   Results from last 7 days   Lab Units 11/08/24  0612 11/07/24  1449   POTASSIUM mmol/L 4.0 4.6   PHOSPHORUS mg/dL 4.1  --    SODIUM mmol/L 145 138   MAGNESIUM mg/dL 2.39  --    EGFR mL/min/1.73m*2 17* 16*   BUN mg/dL 53* 65*   CREATININE mg/dL 2.81* 3.02*    , Lipid Panel:   Lab Results   Component Value Date    CHOL 228 (H) 09/13/2022    HDL 36.2 (A) 09/13/2022    CHHDL 6.3 (A) 09/13/2022    LDLF 152 (H) 09/13/2022    VLDL 40 09/13/2022    TRIG 199 (H) 09/13/2022    , Vit D:   Lab Results   Component Value Date    VITD25 23 (L) 11/07/2024    , Iron Panel: No results found for: \"IRON\", \"TIBC\", \"FERRITIN\"     Nutrition Specific Medications:  All med's reviewed     I/O:    ;      Dietary Orders (From admission, onward)       Start     Ordered    11/07/24 1740  NPO Diet; Effective now  Diet effective now         11/07/24 1739                     Estimated Needs:   Total Energy Estimated Needs (kCal): 1500 kCal (0603-0350)  Method for Estimating Needs: 30 kcal/kg of ABW  Total Protein Estimated Needs (g): 66 g  Method for Estimating " Needs: 1.2-1.5gm/kg of ABW     Method for Estimating Needs: 1mL per kcal or per team        Nutrition Diagnosis   Malnutrition Diagnosis  Patient has Malnutrition Diagnosis: Yes  Diagnosis Status: New  Malnutrition Diagnosis: Moderate malnutrition related to chronic disease or condition  As Evidenced by: severe muscle mass and adipose tissue loss, <75% of EEN met via Po for ~5days  Additional Assessment Information: Pt is NPO, has PEG was on TF PTA            Nutrition Interventions/Recommendations         Nutrition Prescription:  Individualized Nutrition Prescription Provided for : 1620kcal, 73gm PRO        Nutrition Interventions:   Interventions: Enteral intake  When appropriate, Start Isosource 1.5@ 15 mL/hr.   Advance by 10 mL q 6 hrs to goal rate of 45 mL/hr x 24 hr        FWF: per team    Monitor RFP + Mg for s/s refeeding; hold TF at rate and replete lytes, if low, prior to advancing    This regimen provides 1620kcals, 73gm Pro, 825mL water     Continue to document Enteral Intake    Nutrition Education:   na       Nutrition Monitoring and Evaluation   Food/Nutrient Related History Monitoring  Monitoring and Evaluation Plan: Energy intake  Energy Intake: Estimated energy intake  Criteria: >75% of energy needs met via TF         Biochemical Data, Medical Tests and Procedures  Monitoring and Evaluation Plan: Electrolyte/renal panel, Glucose/endocrine profile  Electrolyte and Renal Panel: Magnesium, Phosphorus, Potassium, Sodium  Criteria: WNL  Glucose/Endocrine Profile: Glucose, casual, Glucose, fasting  Criteria: <180              Time Spent (min): 60 minutes

## 2024-11-08 NOTE — PROGRESS NOTES
Pharmacy Medication History Review    Wanda Booth is a 74 y.o. female admitted for FADIA (acute kidney injury) (CMS-[facility]). Pharmacy reviewed the patient's [id number] medications and allergies for accuracy.    Medications ADDED:  ALL MEDICATIONS LISTED   Medications CHANGED:  None  Medications REMOVED:   None    The list below reflects the updated PTA list.   Prior to Admission Medications   Prescriptions Last Dose Informant   Lactobacillus acidophilus capsule Unknown Self   Sig: Take 1 capsule by mouth once daily.   acetaminophen (Tylenol) 325 mg tablet Unknown Self   Sig: Take 2 tablets (650 mg) by mouth every 4 hours if needed for mild pain (1 - 3).   ciprofloxacin (Cipro) 500 mg tablet Unknown Self   Sig: Take 1 tablet (500 mg) by mouth every 12 hours. For 7 days   docusate sodium (Colace) 100 mg capsule Unknown Self   Sig: Take 1 capsule (100 mg) by mouth 2 times a day.   melatonin 1 mg tablet Unknown Self   Sig: Take 1 tablet (1 mg) by mouth once daily at bedtime.   metoprolol tartrate (Lopressor) 25 mg tablet Unknown Self   Sig: Take 1 tablet (25 mg) by mouth every 12 hours.   mirtazapine (Remeron) 7.5 mg tablet Unknown Self   Sig: Take 1 tablet (7.5 mg) by mouth once daily.   pantoprazole (Protonix) 40 mg EC tablet Unknown Self   Sig: Take 1 tablet (40 mg) by mouth once daily. Do not crush, chew, or split.   trospium (Sanctura) 20 mg tablet Unknown Self   Sig: Take 1 tablet (20 mg) by mouth once daily.      Facility-Administered Medications: None        The list below reflects the updated allergy list. Please review each documented allergy for additional clarification and justification.  Allergies  Reviewed by Lesa Díaz on 11/7/2024   No Known Allergies         Patient was unable to be assessed for M2B at discharge. Patient is from [facility] facility.     Sources:   [facility] Order Summary Report generated on 11/07/2024 at 21:02:35 ET   Patient dispense hx    chart review   Care  "Everywhere     Additional Comments:  Patients PTA list was created using [facility] Order Summary Report generated on 11/07/2024 at 21:02:35 ET . All updates and changes made to patients PTA list were made to reflect SNF order summary report.       Lesa Díaz  Pharmacy Technician  11/07/24     Secure Chat preferred   If no response call [phone] or [employer] \"Med Rec\"   "

## 2024-11-08 NOTE — POST-PROCEDURE NOTE
Interventional Radiology Post-Procedure Note    left nephrostomy tube placement and right nephrostomy tube exchange    Procedure Details:  Technically successful and uncomplicated left nephrostomy tube placement and right nephrostomy tube exchange.  Please see PACS for full procedural details.    Patient Tolerance: good  Complications: None    Indication for procedure: The primary encounter diagnosis was FADIA (acute kidney injury) (CMS-HCC). Diagnoses of Urinary tract infection associated with indwelling urethral catheter, initial encounter (CMS-HCC) and Hypercalcemia were also pertinent to this visit.    Pre-Procedure Verification and Time Out:  · Procedure Location procedure area   · HUDDLE - Pre-procedure Verification completed   · TIME OUT - Final Verification completed immediately prior to procedure start   · DEBRIEF completed     General Information:  Date/Time of Procedure: 11/08/24 at 5:34 PM  Indication(s): hydronephrosis  Findings: See PACS  Procedure performed by: Salvatore Perdue MD   Assistant(s): Dr. Satinder Brown MD  Estimated Blood Loss (mL): minimal  Specimen: No  Informed Consent: written consent obtained    Prep:  Ultrasound Guided Insertion: Yes  Large Drape, Hand Hygiene, Surgical Cap, Surgical Mask, Sterile Gown, Sterile Gloves, Glasses, and Scrubs  Patient Position: Prone  Site Prep: chlorhexidine, draped, usual sterile procedure followed    Anesthesia/Medications:  Procedural Sedation:  Medications  As of 11/08/24 1734      sodium chloride 0.9 % bolus 1,000 mL (mL/hr) Total volume:  Not documented* Dosing weight:  72.6   *Total volume has not been documented. View each administration to see the amount administered.     Date/Time Rate/Dose/Volume Action       11/07/24  1506 1,000 mL - 2,000 mL/hr (over 30 min) New Bag      1610  (over 30 min) Stopped      1653 1,000 mL - 2,000 mL/hr (over 30 min) New Bag      1924  (over 30 min) Stopped               cefTRIAXone (Rocephin) 1 g in dextrose  (iso) IV 50 mL (mL/hr) Total volume:  Not documented* Dosing weight:  72.6   *Total volume has not been documented. View each administration to see the amount administered.     Date/Time Rate/Dose/Volume Action       11/07/24  1936 1 g - 100 mL/hr (over 30 min) New Bag      2046  (over 30 min) Stopped               heparin (porcine) injection 5,000 Units (Units) Total dose:  15,000 Units Dosing weight:  72.6      Date/Time Rate/Dose/Volume Action       11/07/24  1935 5,000 Units Given     11/08/24  0425 5,000 Units Given      1012 5,000 Units Given               sodium chloride 0.9% infusion (mL/hr) Total volume:  1,532.5 mL* Dosing weight:  72.6   *From user-documented volume     Date/Time Rate/Dose/Volume Action       11/07/24  1934 100 mL/hr New Bag     11/08/24  0800 *Not included in total Held by provider      0822  Stopped      0921 *Not included in total Unheld by provider      0953 1,280 mL       1012 150 mL/hr Restarted      1017 150 mL/hr Rate Change      1105 150 mL/hr - 120 mL Rate Verify      1106 150 mL/hr - 0 mL Rate Verify      1140 150 mL/hr - 0 mL Rate Verify      1233 150 mL/hr - 132.5 mL Rate Verify               piperacillin-tazobactam (Zosyn) 2.25 g in dextrose (iso) IV 50 mL (g) Total dose:  2.25 g Dosing weight:  53      Date/Time Rate/Dose/Volume Action       11/08/24  1012 2.25 g (over 30 min) New Bag      1106  (over 30 min) Stopped               fentaNYL PF (Sublimaze) injection (mcg) Total dose:  25 mcg      Date/Time Rate/Dose/Volume Action       11/08/24  1608 25 mcg Given               midazolam (Versed) injection (mg) Total dose:  0.5 mg      Date/Time Rate/Dose/Volume Action       11/08/24  1608 0.5 mg Given               iohexol (OMNIPaque) 350 mg iodine/mL solution 100 mL (mL) Total volume:  100 mL Dosing weight:  53      Date/Time Rate/Dose/Volume Action       11/08/24  1617 100 mL Given                   Fentanyl: 25 mcg  Versed: 0.5 mg  1% Lidocaine: 12 mL    Attending  Attestation:  I was present for key portions of the procedure which was greater than 5 minutes    Salvatore Perdue MD, PGY-6  Interventional Radiology  IR pager: [phone]    NON-Urgent on call weekends and after hours weekdays (5pm - 5am) IR pager: [phone]  Urgent & emergent on call weekends and after hours weekdays (5pm-7am) IR pager: [phone]

## 2024-11-08 NOTE — CARE PLAN
The patient's goals for the shift include AME    The clinical goals for the shift include No falls, kidney function return to WNL    Over the shift, the patient did not make progress toward the following goals. Barriers to progression include Monitor patient labs and vitals signs. Recommendations to address these barriers include Purposeful rounding.

## 2024-11-08 NOTE — CARE PLAN
The patient's goals for the shift include AME    The clinical goals for the shift include No falls, kidney function return to WNL    Over the shift, the patient did not make progress toward the following goals. Barriers to progression include Monitor labs and vitals. Recommendations to address these barriers include purposeful rounding.

## 2024-11-08 NOTE — PROGRESS NOTES
Physical Therapy                 Therapy Communication Note    Patient Name: Wanda Booth  MRN: [id number]  Department: [facility]  Room: 5555/5555-A  Today's Date: 11/8/2024     Discipline: Physical Therapy    Missed Visit Reason: Missed Visit Reason: Patient sleeping (pt sleeping through nurse care and very lethargic. per RN up to floor at 2am . Will reattempt as schedule permits.)    Missed Time: Attempt    Comment:

## 2024-11-08 NOTE — PROGRESS NOTES
Wanda Booth is a 74 y.o. female on day 1 of admission presenting with FADIA (acute kidney injury) (CMS-[facility]).      Subjective   Pt lethargic on exam still shivering. Mentioned b/l ear pain, neck pain and being very cold.     Objective     Last Recorded Vitals  /59   Pulse 84   Temp 36.2 °C (97.2 °F)   Resp 16   Wt 53 kg (116 lb 13.5 oz)   SpO2 96%   Intake/Output last 3 Shifts:    Intake/Output Summary (Last 24 hours) at 11/8/2024 1323  Last data filed at 11/8/2024 1233  Gross per 24 hour   Intake 1532.5 ml   Output 450 ml   Net 1082.5 ml       Admission Weight  Weight: 72.6 kg (160 lb) (11/07/24 1206)    Daily Weight  11/08/24 : 53 kg (116 lb 13.5 oz)    Image Results  CT abdomen pelvis wo IV contrast  Narrative: Interpreted By:  John Paul Woods,  and Alec So   STUDY:  CT ABDOMEN PELVIS WO IV CONTRAST;  11/7/2024 6:00 pm      INDICATION:  Signs/Symptoms:hydronephrosis, R nephrostomy tube.          COMPARISON:  No prior images for comparison. Outside CT of 09/24/2024 is  unavailable for review at the time of dictation.      ACCESSION NUMBER(S):  [id number]      ORDERING CLINICIAN:  RICK PRO      TECHNIQUE:  Contiguous axial images of the abdomen and pelvis were obtained in  the absence of intravenous contrast. Coronal and sagittal reformatted  images were reconstructed from the axial data.      FINDINGS:  LOWER CHEST: Small left and trace right pleural effusions. There are  multiple solid pulmonary nodules throughout the bilateral lower  lungs, for example clustered nodules in the perihilar left lower lobe  measuring up to 0.7 cm (series 205, image 6) and nodules throughout  the right middle lobe including a pleural-based nodule measuring up  to 0.6 cm (image 15). There are also a few centrally cavitary nodules  measuring up to 1.0 cm within the right middle lobe (image 9) and 0.7  cm in the posterior left upper lobe (image 11). The heart is  nonenlarged. There are few ill-defined  soft tissue densities,  possibly enlarged lymph nodes in the lower paraesophageal  mediastinum, including a left paraesophageal node measuring 1.4 cm in  short axis (series 201, image 23) and a right paraesophageal node  measuring 1.1 cm in short axis (image 24). The visualized soft  tissues and osseous structures of the lower chest wall are  unremarkable.      LIVER: No significant parenchymal abnormality.      BILE DUCTS: No significant intrahepatic or extrahepatic dilatation.      GALLBLADDER: Absent or contracted.      PANCREAS: Pancreas demonstrates heterogeneous attenuation  predominantly towards the body and tail. There is a questionable  ill-defined hypodensity in the pancreatic tail extending into the  splenic hilum. There is associated mild peripancreatic stranding. No  significant ductal dilatation.      SPLEEN: Heterogeneous low-density attenuation of the spleen.      ADRENALS: No significant abnormality.      KIDNEYS, URETERS, BLADDER: Right percutaneous nephrostomy tube in  place with pigtail coiling in the right renal pelvis. There is severe  right hydroureteronephrosis with moderate right renal cortical  thinning. Moderate left hydroureteronephrosis. No nephrolithiasis.  There is diffuse nodular thickening and haziness of the urinary  bladder with a Calvillo catheter in place. Multiple perivesical nodules  or lymph nodes surrounding the right-greater-than-left urinary  bladder walls measuring up to 1.5 cm.      REPRODUCTIVE ORGANS: Lippes loop intrauterine device in place.      GI: PEG tube in place. No evidence of obstruction or significant  inflammatory bowel wall thickening. Extensive colonic diverticulosis  without focal diverticulitis. The appendix is not visualized. No  pericecal inflammatory change or secondary signs of acute  appendicitis.      VESSELS: No significant abnormality. The portal veins and IVC are  patent.      PERITONEUM/RETROPERITONEUM: No ascites, free air, or fluid collection.       LYMPH NODES: There are multiple enlarged pelvic lymph nodes including  a node along the left pelvic sidewall measuring up to 1.3 cm, and  along the right pelvic sidewall measuring 1.6 cm (series 201, image  113).      ABDOMINAL WALL: No significant abnormality.      OSSEOUS STRUCTURES: No acute osseous abnormality.      Impression: 1. Severe right and moderate left hydroureteronephrosis. Right  percutaneous nephrostomy tube is in expected position with pigtail in  the right renal pelvis without evidence of discontinuity or kinking.  Comparison with prior outside imaging from 09/24/2024.  2. Marked hazy thickening of the urinary bladder wall with extensive  perivesical nodularity. These may represent collapsed urinary bladder  diverticula or perivesical soft tissue nodularity.  3. Few solid and few cavitary nodules throughout the bilateral lung  bases may relate to malignancy or atypical infectious process.  4. Heterogeneous appearance of the pancreatic body and tail with the  questionable ill-defined hypoattenuating area in the tail and  peripancreatic stranding. Findings can represent   sequela to known  acute pancreatitis. Underlying focal pancreatic mass can not be  excluded. Comparison with prior outside imaging from 09/24/2024 is  recommended.  5. Multiple enlarged pelvic and lower paraesophageal lymph nodes are  presumed metastatic or less likely reactive.  6. Small left and trace right pleural effusions.  7. PEG tube in place with bumper in the gastric body.      I personally reviewed the image(s)/study and resident interpretation  as stated by Dr. Saray Michael MD. I agree with the findings as  stated. This study was interpreted at [facility], [place], [place].      MACRO:  None      Signed by: John Paul Woods 11/7/2024 6:55 PM  Dictation workstation:   [id number]  Point of Care Ultrasound  Bertrand Gibson MD     11/7/2024  5:24 PM    Performed by: Matt  DO Kevan  Authorized by: Bertrand Gibson MD      Genitourinary Indications: retention of urine and hematuria    Procedure: Renal Ultrasound    Findings:  Right Kidney: The RIGHT kidney was visualized and was positive for   HYDRONEPHROSIS.  Left Kidney: The LEFT kidney was visualized and was positive for   HYDRONEPHROSIS.  Bladder: The bladder was visualized and was DECOMPRESSED. and Catheter in   place    Impression:  Renal: abnormal renal ultrasound  Procedure: Bladder Ultrasound    Findings:  Bladder Visualization: The bladder was visualized and was DECOMPRESSED.    Impression:  Bladder: The bladder was DECOMPRESSED.    Comments: Calvillo catheter balloon in bladder. Percutaneous nephrostomy in   right renal pelvis.  CT head wo IV contrast, CT cervical spine wo IV contrast  Addendum: Interpreted By:  Ramon Sharma and Awan Komal    ADDENDUM:   Exam finding has been discussed with Dr. Jessica Anand at 2:20PM by   resident Blanquita Leavitt.        Signed by: Ramon Sharma 11/7/2024 2:41 PM        -------- ORIGINAL REPORT --------   Dictation workstation:   [id number]  Narrative: Interpreted By:  Ramon Sharma and Awan Komal   STUDY:  CT HEAD WO IV CONTRAST; CT CERVICAL SPINE WO IV CONTRAST;  11/7/2024  1:25 pm      INDICATION:  Signs/Symptoms:fall, head strike.          COMPARISON:  None.      ACCESSION NUMBER(S):  [id number]; [id number]      ORDERING CLINICIAN:  JESSICA ANAND      TECHNIQUE:  Noncontrast axial CT scan of head was performed. Angled reformats in  brain and bone windows were generated. The images were reviewed in  bone, brain, blood and soft tissue windows. Axial CT images of the  cervical spine are obtained. Axial, coronal and sagittal  reconstructions are provided for review.      FINDINGS:  Findings head CT:      Midline structures are intact.      There is no intracranial hemorrhage.      The gray-white matter differentiation is intact there is no evidence  of any ischemia.      There are confluent  hypodensities in the bilateral cerebral  hemisphere, compatible with microangiopathic changes. Mild brain  parenchymal volume loss.      There is mild-to-moderate sulci prominence. Moderate dilatation of  the lateral and 3rd greater than 4th ventricles at least in part due  to global parenchymal volume loss. The basal cisterns are patent.  There is no extra-axial fluid collection.      The calvarium is intact.      Visualized paranasal sinuses and mastoids are clear.      Moderate atherosclerotic calcification of the cavernous internal  carotid arteries and left V4 vertebral artery noted.          Findings cervical spine CT:  Fractures: There is no evidence for an acute fracture of the cervical  spine.      Vertebral Alignment: Minimal exaggeration of cervical lordosis. 2 mm  of retrolisthesis of C4 on C5. Subtle widening of the anterior spinal  line at C4-5. Subtle foreshortening in the anterior-dimension of C5  and C6 with accessory articulation on the left at the C5-6 lateral  margin of the vertebral bodies.      Craniocervical Junction: The odontoid process and craniocervical  junction are intact.      Vertebrae/Disc Spaces: The cervical vertebral body heights are intact  and the disc spaces are preserved. There are mild degenerative  endplate changes throughout the cervical spine.      Prevertebral/Paraspinal Soft Tissues: The prevertebral and paraspinal  soft tissues are unremarkable.      Impression: No CT evidence of acute intracranial hemorrhage, infarct or mass  effect.      No definite posttraumatic abnormality with no evidence for an acute  fracture or subluxation of the cervical spine was identified. Subtle  widening of the anterior intervertebral disc space at C4-5. Given  suggestion of possible congenital deformity of C5 and C6 finding  could be incidental on the basis of subtle congenital malformation in  this region rather than due to ligamentous injury. Please correlate  clinically and if further  evaluation were clinically necessary MRI  for instance would have added sensitivity and specificity in this  regard.      I personally reviewed the images/study and I agree with the findings  as stated by Resident Blanquita Leavitt. This study was interpreted at  [facility], [place], [place].      MACRO:  None          Signed by: Ramon Sharma 11/7/2024 2:16 PM  Dictation workstation:   [id number]      Physical Exam  Constitutional:       Comments: Cachetic and ill-appearing, pt very lethargic on exam but responsive    HENT:      Head: Normocephalic.      Comments: Laceration of r temporal face with dried blood      Nose: No rhinorrhea.      Mouth/Throat:      Mouth: Mucous membranes are dry.      Comments: Dark brown plaque on tongue. Poor dentition, only has a few teeth on the bottom low.   Eyes:      General: No scleral icterus.        Right eye: No discharge.         Left eye: No discharge.      Pupils: Pupils are equal, round, and reactive to light.   Cardiovascular:      Rate and Rhythm: Normal rate and regular rhythm.      Heart sounds: No murmur heard.     No friction rub. No gallop.   Pulmonary:      Effort: No respiratory distress.      Breath sounds: No wheezing, rhonchi or rales.   Chest:      Chest wall: No tenderness.   Abdominal:      General: Bowel sounds are normal. There is no distension.      Palpations: Abdomen is soft.      Tenderness: There is abdominal tenderness.      Comments: PEG   Musculoskeletal:         General: No tenderness.      Right lower leg: No edema.      Left lower leg: No edema.      Comments: Inability to bend knee on command, however had ability to move all extremities and turn head    Skin:     General: Skin is warm and dry.      Coloration: Skin is not jaundiced.      Findings: No rash.   Neurological:      Mental Status: She is disoriented.      Comments: Oriented only to person. Negative babinski. 2+strength in b/l finger          Relevant  Results  Scheduled medications  heparin (porcine), 5,000 Units, subcutaneous, q8h  piperacillin-tazobactam, 2.25 g, intravenous, q6h      Continuous medications  sodium chloride 0.9%, 150 mL/hr, Last Rate: 150 mL/hr (11/08/24 1233)      PRN medications  PRN medications: bisacodyl, polyethylene glycol       This patient has a urinary catheter   Reason for the urinary catheter remaining today? urinary retention/bladder outlet obstruction, acute or chronic      Assessment and Plan:  Ms. Wanda Booth is a 75 yo woman with PMHx b/l hydronephrosis (w/ r nephrostomy tube), chronic carreon, PEG, recently tx for FADIA/sepsis/acute pancreatitis at [facility] and completed course of cipro presenting to  ED 11/7 for recurrent falls and concern for UTI. Pt came from [facility]. In the ED pt found to have FADIA and hypercalcemia currently tx with fluids. Carreon replaced in ED and with concern for UTI blood cx and UA with cx ordered. Concern for limited drainage from neph so urology following CTAP with concern for maligancy with mets, and pancreatitis hx noted at F. Pt with AMS per son seems to be for 1 month with more exacerbation in 1 weeks after [facility] hospital stay and 3 falls at SNF. Continue further workup through C[facility] normal and CT cervical spine also wnl after fall. Pt started on CTX 11/7 changed to zosyn 11/8. Will monitor for improvement in cr and ca with fluids and consider calcitonin or zoledronic acid.         CTAP 11/7: IMPRESSION:  1. Severe right and moderate left hydroureteronephrosis. Right  percutaneous nephrostomy tube is in expected position with pigtail in  the right renal pelvis without evidence of discontinuity or kinking.  Comparison with prior outside imaging from 09/24/2024.  2. Marked hazy thickening of the urinary bladder wall with extensive  perivesical nodularity. These may represent collapsed urinary bladder  diverticula or perivesical soft tissue nodularity.  3. Few solid and few cavitary nodules  throughout the bilateral lung  bases may relate to malignancy or atypical infectious process.  4. Heterogeneous appearance of the pancreatic body and tail with the  questionable ill-defined hypoattenuating area in the tail and  peripancreatic stranding. Findings can represent   sequela to known  acute pancreatitis. Underlying focal pancreatic mass can not be  excluded. Comparison with prior outside imaging from 09/24/2024 is  recommended.  5. Multiple enlarged pelvic and lower paraesophageal lymph nodes are  presumed metastatic or less likely reactive.  6. Small left and trace right pleural effusions.  7. PEG tube in place with bumper in the gastric body.    Dispo: Patient came to ED from [facility]. PT unable to work with pt because of lethargy, OT rec mod intensity, Son Colten reports upon discharge having pt move to [place] with him for closer monitoring.            #AMS vs #Hypercalcemic Encephalopathy vs #Dementia  #Hypercalcemia  -Ca of 13.6 in ED  -CTH with no acute findings  -Son Colten endorses pt lived at home with  and could do all ADLs and communicate clearly 1 month ago (10/2024), son [place] out of state, recalls for conversation with pt 4-5 days ago (11/4)  -s/p 2L NS in ED  Plan:  -fluids increased from 100ml/hr to 150ml/hr NS   -ical elevated, pth pthrp, HIV/Syph neg, TSH elevated, and thyroxine wnl, b12 400s wnl, vit d low 16  -if not improving after fluids will start bisphos but will consider zolendronic acid   -on tele, EKG 11/7 and 11/8 with no acute changes     #Subacute cystitis  #UTI  -previously [facility] cipro from [facility]  #Leukocytosis   -wbc 20.9 in ED  #FADIA on CKD4  -Cr 3.02 on admission baseline 1.1 and elevated BUN 65 in ED  #Chronic Calvillo  -exchanged 11/7 in ED  #r Nephrostomy Tube  #B/L Hydronephrosis   -She has significant FADIA on CKD iso severe b/l hydronephrosis s/p R PCNT 10/3/24. -Bedside ultrasound showed b/l hydro in ED 11/7  -Labs prior to admission Ca 12.4 PTH 17 CKD4 based on  Cystatin C of 1.98, baseline - -Pre-alb 9   -received 2L NS in ED  -CTAP very thick bladder wall with pelvic lymphadenopathy (1.3-1.6cm) and BL hydroureteronephrosis R>L despite  #Concern for  Malignancy with mets  -considering cervical vs bladder, continuing to investigate   Plan:  - mL/hr increased to 150 ml/hr 11/8  -UA and cx, blood cx collected 11/7 pending   -Changed CTX (11/7) to zosyn (11/8-xx), urology will do cytoscopy once UTI improved   - Urology  following rec: Maintain R PCNT and carreon catheter         - Trend cr and UOP, if not improving do PCNT replacement with IR L PCNT placement             -IR pelvic LN biopsy ordered 11/8              - Would recommend CTU (with clamped PCNT) for upper tract evaluation once renal function improve or MRU.              - Sent urine cytology 11/8  -ionized ca high ,pth wnl, pthrp pending, urine electrolytes wnl, folate wnl  Utox neg, + UTI workup below, HIV neg. Syph neg. TSH high, b12 wnl, vit d low 16  -CTAP 11/7 findings above  -PET scan scheduled 11/11 10am    #Pancreatitis  Plan:  -tx at [facility], evidence found on CTAP 11/7  -ctm       #PEG  #Failure to thrive  #Poor dentition  #Normocytic Anemia  - Malnourished, prealbumin 9/2024 9.0, now s/p PEG   - C/f underlying malignancy  -poor po intake requiring PEG at [facility] 10/2024  -hgb 9.5, mcv 95  -medical care prior to 2024 was in 2022  Plan:  -nutrition rec: When appropriate, Start Isosource 1.5@ 15 mL/hr.   Advance by 10 mL q 6 hrs to goal rate of 45 mL/hr x 24 hr  -tube feeds started 11/8 monitor refeeding   -prealbumin pending 11/8      #Unspecified Recurrent Falls  -CTHead and CTcervical spine wnl no acute findings  - Falls likely 2/2 hypovolemia iso poor PO intake, potential sepsis  Plan:  -ctm  -on tele  -fall risk noted in orders, bed alarm needed and bracelet         F NS 150ml/hr  E prn K>4, Mg>2 phos>3, monitor refeeding replete  N tube feeds  A PIV, PEG, Carreon, Neph tube   DVT ppx subcutaneous  hep  Bowel reg miralax prn     Code Status: FULL CODE (pt with/o capacity, confirmed status with son Colten)   NOK: Colten Booth [phone], Torsten Booth () home  (need to establish contact)    Mariana Robertson MD

## 2024-11-08 NOTE — PRE-PROCEDURE NOTE
INTERVENTIONAL RADIOLOGY PRE-PROCEDURE NOTE    Wanda Booth is a 74 y.o. female with hydronephrosis bilaterally who presents to the interventional radiology department for left nephrostomy tube placement and right nephrostomy tube exchange.    Procedure: left nephrostomy tube placement and right nephrostomy tube exchange    Indication for procedure: The primary encounter diagnosis was FADIA (acute kidney injury) (CMS-HCC). Diagnoses of Urinary tract infection associated with indwelling urethral catheter, initial encounter (CMS-HCC) and Hypercalcemia were also pertinent to this visit.    No past medical history on file.   Past Surgical History:   Procedure Laterality Date    CHOLECYSTECTOMY  06/14/2016    Cholecystectomy Laparoscopic    OOPHORECTOMY  06/14/2016    Oophorectomy - Unilateral (Removal Of One Ovary)       Relevant Labs:   Lab Results   Component Value Date    CREATININE 2.81 (H) 11/08/2024    EGFR 17 (L) 11/08/2024    INR 1.1 11/08/2024    PROTIME 12.6 11/08/2024       Planned Sedation/Anesthesia: Moderate    Directed physical examination:    General: No acute distress  Heart: Heart regular rate and rhythm  Lungs: No increased work of breathing  Abdomen: soft and nontender  Psych: disoriented      Current Facility-Administered Medications:     bisacodyl (Dulcolax) suppository 10 mg, 10 mg, rectal, Daily PRN, Bertrand Holbrook MD    heparin (porcine) injection 5,000 Units, 5,000 Units, subcutaneous, q8h, Bertrand Holbrook MD, 5,000 Units at 11/08/24 1012    piperacillin-tazobactam (Zosyn) 2.25 g in dextrose (iso) IV 50 mL, 2.25 g, intravenous, q6h, Bertrand Holbrook MD, Stopped at 11/08/24 1106    polyethylene glycol (Glycolax, Miralax) packet 17 g, 17 g, oral, Daily PRN, Bertrand Holbrook MD    sodium chloride 0.9% infusion, 150 mL/hr, intravenous, Continuous, Bertrand Holbrook MD, Last Rate: 150 mL/hr at 11/08/24 1233, 150 mL/hr at 11/08/24 1233    sodium chloride 0.9% infusion, 150 mL/hr, intravenous, Continuous,  Mariana Robertson MD     Mallampati: Unable to Assess    ASA Score: ASA 3 - Patient with moderate systemic disease with functional limitations    Benefits, risks and alternatives of procedure and planned sedation have been discussed with the patient and/or their representative. All questions answered and they agree to proceed.     Salvatore Perdue MD, PGY-6  Vascular & Interventional Radiology  IR pager: [phone]    NON-Urgent on call weekends and after hours weekdays (5pm - 5am) IR pager: [phone]  Urgent & emergent on call weekends and after hours weekdays (5pm-7am) IR pager: [phone]

## 2024-11-09 LAB
ALBUMIN SERPL BCP-MCNC: 2.1 G/DL (ref 3.4–5)
ALBUMIN SERPL BCP-MCNC: 2.3 G/DL (ref 3.4–5)
ANION GAP SERPL CALC-SCNC: 12 MMOL/L (ref 10–20)
ANION GAP SERPL CALC-SCNC: 12 MMOL/L (ref 10–20)
BACTERIA UR CULT: NO GROWTH
BASOPHILS # BLD AUTO: 0.16 X10*3/UL (ref 0–0.1)
BASOPHILS NFR BLD AUTO: 0.8 %
BUN SERPL-MCNC: 37 MG/DL (ref 6–23)
BUN SERPL-MCNC: 39 MG/DL (ref 6–23)
CALCIUM SERPL-MCNC: 11.7 MG/DL (ref 8.6–10.6)
CALCIUM SERPL-MCNC: 11.7 MG/DL (ref 8.6–10.6)
CHLORIDE SERPL-SCNC: 115 MMOL/L (ref 98–107)
CHLORIDE SERPL-SCNC: 120 MMOL/L (ref 98–107)
CO2 SERPL-SCNC: 21 MMOL/L (ref 21–32)
CO2 SERPL-SCNC: 22 MMOL/L (ref 21–32)
CREAT SERPL-MCNC: 2.09 MG/DL (ref 0.5–1.05)
CREAT SERPL-MCNC: 2.23 MG/DL (ref 0.5–1.05)
EGFRCR SERPLBLD CKD-EPI 2021: 23 ML/MIN/1.73M*2
EGFRCR SERPLBLD CKD-EPI 2021: 24 ML/MIN/1.73M*2
EOSINOPHIL # BLD AUTO: 1.26 X10*3/UL (ref 0–0.4)
EOSINOPHIL NFR BLD AUTO: 6.2 %
ERYTHROCYTE [DISTWIDTH] IN BLOOD BY AUTOMATED COUNT: 17.5 % (ref 11.5–14.5)
ERYTHROCYTE [DISTWIDTH] IN BLOOD BY AUTOMATED COUNT: 17.6 % (ref 11.5–14.5)
GLUCOSE BLD MANUAL STRIP-MCNC: 134 MG/DL (ref 74–99)
GLUCOSE BLD MANUAL STRIP-MCNC: 139 MG/DL (ref 74–99)
GLUCOSE BLD MANUAL STRIP-MCNC: 151 MG/DL (ref 74–99)
GLUCOSE BLD MANUAL STRIP-MCNC: 162 MG/DL (ref 74–99)
GLUCOSE BLD MANUAL STRIP-MCNC: 170 MG/DL (ref 74–99)
GLUCOSE SERPL-MCNC: 132 MG/DL (ref 74–99)
GLUCOSE SERPL-MCNC: 174 MG/DL (ref 74–99)
HCT VFR BLD AUTO: 25.1 % (ref 36–46)
HCT VFR BLD AUTO: 28.4 % (ref 36–46)
HGB BLD-MCNC: 7.8 G/DL (ref 12–16)
HGB BLD-MCNC: 8.6 G/DL (ref 12–16)
IMM GRANULOCYTES # BLD AUTO: 0.15 X10*3/UL (ref 0–0.5)
IMM GRANULOCYTES NFR BLD AUTO: 0.7 % (ref 0–0.9)
LYMPHOCYTES # BLD AUTO: 3.65 X10*3/UL (ref 0.8–3)
LYMPHOCYTES NFR BLD AUTO: 17.9 %
MAGNESIUM SERPL-MCNC: 1.81 MG/DL (ref 1.6–2.4)
MAGNESIUM SERPL-MCNC: 2.06 MG/DL (ref 1.6–2.4)
MCH RBC QN AUTO: 30.8 PG (ref 26–34)
MCH RBC QN AUTO: 31.6 PG (ref 26–34)
MCHC RBC AUTO-ENTMCNC: 30.3 G/DL (ref 32–36)
MCHC RBC AUTO-ENTMCNC: 31.1 G/DL (ref 32–36)
MCV RBC AUTO: 102 FL (ref 80–100)
MCV RBC AUTO: 102 FL (ref 80–100)
MONOCYTES # BLD AUTO: 1.49 X10*3/UL (ref 0.05–0.8)
MONOCYTES NFR BLD AUTO: 7.3 %
NEUTROPHILS # BLD AUTO: 13.69 X10*3/UL (ref 1.6–5.5)
NEUTROPHILS NFR BLD AUTO: 67.1 %
NRBC BLD-RTO: 0 /100 WBCS (ref 0–0)
NRBC BLD-RTO: 0 /100 WBCS (ref 0–0)
PHOSPHATE SERPL-MCNC: 2.8 MG/DL (ref 2.5–4.9)
PHOSPHATE SERPL-MCNC: 3.2 MG/DL (ref 2.5–4.9)
PLATELET # BLD AUTO: 366 X10*3/UL (ref 150–450)
PLATELET # BLD AUTO: 367 X10*3/UL (ref 150–450)
POTASSIUM SERPL-SCNC: 3.3 MMOL/L (ref 3.5–5.3)
POTASSIUM SERPL-SCNC: 3.6 MMOL/L (ref 3.5–5.3)
RBC # BLD AUTO: 2.47 X10*6/UL (ref 4–5.2)
RBC # BLD AUTO: 2.79 X10*6/UL (ref 4–5.2)
SODIUM SERPL-SCNC: 145 MMOL/L (ref 136–145)
SODIUM SERPL-SCNC: 150 MMOL/L (ref 136–145)
WBC # BLD AUTO: 20.2 X10*3/UL (ref 4.4–11.3)
WBC # BLD AUTO: 20.4 X10*3/UL (ref 4.4–11.3)

## 2024-11-09 PROCEDURE — 82947 ASSAY GLUCOSE BLOOD QUANT: CPT

## 2024-11-09 PROCEDURE — 85025 COMPLETE CBC W/AUTO DIFF WBC: CPT

## 2024-11-09 PROCEDURE — 2500000004 HC RX 250 GENERAL PHARMACY W/ HCPCS (ALT 636 FOR OP/ED): Performed by: NUTRITIONIST

## 2024-11-09 PROCEDURE — 2500000004 HC RX 250 GENERAL PHARMACY W/ HCPCS (ALT 636 FOR OP/ED): Performed by: INTERNAL MEDICINE

## 2024-11-09 PROCEDURE — 85027 COMPLETE CBC AUTOMATED: CPT

## 2024-11-09 PROCEDURE — 99232 SBSQ HOSP IP/OBS MODERATE 35: CPT

## 2024-11-09 PROCEDURE — 80069 RENAL FUNCTION PANEL: CPT

## 2024-11-09 PROCEDURE — 36415 COLL VENOUS BLD VENIPUNCTURE: CPT

## 2024-11-09 PROCEDURE — 2500000001 HC RX 250 WO HCPCS SELF ADMINISTERED DRUGS (ALT 637 FOR MEDICARE OP)

## 2024-11-09 PROCEDURE — 2500000004 HC RX 250 GENERAL PHARMACY W/ HCPCS (ALT 636 FOR OP/ED)

## 2024-11-09 PROCEDURE — 1200000002 HC GENERAL ROOM WITH TELEMETRY DAILY

## 2024-11-09 PROCEDURE — 83735 ASSAY OF MAGNESIUM: CPT

## 2024-11-09 RX ORDER — ACETAMINOPHEN 325 MG/1
650 TABLET ORAL EVERY 6 HOURS PRN
Status: DISCONTINUED | OUTPATIENT
Start: 2024-11-09 | End: 2024-11-10

## 2024-11-09 RX ORDER — POTASSIUM CHLORIDE 1.5 G/1.58G
20 POWDER, FOR SOLUTION ORAL ONCE
Status: COMPLETED | OUTPATIENT
Start: 2024-11-09 | End: 2024-11-09

## 2024-11-09 RX ORDER — SODIUM CHLORIDE 450 MG/100ML
75 INJECTION, SOLUTION INTRAVENOUS CONTINUOUS
Status: DISPENSED | OUTPATIENT
Start: 2024-11-09 | End: 2024-11-13

## 2024-11-09 RX ORDER — SODIUM CHLORIDE 450 MG/100ML
125 INJECTION, SOLUTION INTRAVENOUS CONTINUOUS
Status: DISCONTINUED | OUTPATIENT
Start: 2024-11-09 | End: 2024-11-09 | Stop reason: SDUPTHER

## 2024-11-09 ASSESSMENT — COGNITIVE AND FUNCTIONAL STATUS - GENERAL
DAILY ACTIVITIY SCORE: 8
DRESSING REGULAR UPPER BODY CLOTHING: TOTAL
EATING MEALS: TOTAL
CLIMB 3 TO 5 STEPS WITH RAILING: TOTAL
EATING MEALS: TOTAL
PERSONAL GROOMING: A LOT
HELP NEEDED FOR BATHING: TOTAL
TOILETING: TOTAL
DAILY ACTIVITIY SCORE: 7
MOVING TO AND FROM BED TO CHAIR: TOTAL
WALKING IN HOSPITAL ROOM: TOTAL
DRESSING REGULAR LOWER BODY CLOTHING: TOTAL
CLIMB 3 TO 5 STEPS WITH RAILING: TOTAL
WALKING IN HOSPITAL ROOM: TOTAL
HELP NEEDED FOR BATHING: TOTAL
MOBILITY SCORE: 8
TURNING FROM BACK TO SIDE WHILE IN FLAT BAD: A LOT
MOVING TO AND FROM BED TO CHAIR: TOTAL
MOBILITY SCORE: 8
MOVING FROM LYING ON BACK TO SITTING ON SIDE OF FLAT BED WITH BEDRAILS: A LOT
TURNING FROM BACK TO SIDE WHILE IN FLAT BAD: A LOT
STANDING UP FROM CHAIR USING ARMS: TOTAL
MOVING FROM LYING ON BACK TO SITTING ON SIDE OF FLAT BED WITH BEDRAILS: A LOT
DRESSING REGULAR UPPER BODY CLOTHING: A LOT
TOILETING: TOTAL
DRESSING REGULAR LOWER BODY CLOTHING: TOTAL
PERSONAL GROOMING: A LOT
STANDING UP FROM CHAIR USING ARMS: TOTAL

## 2024-11-09 ASSESSMENT — PAIN SCALES - PAIN ASSESSMENT IN ADVANCED DEMENTIA (PAINAD)
TOTALSCORE: 4
NEGVOCALIZATION: OCCASIONAL MOAN/GROAN, LOW SPEECH, NEGATIVE/DISAPPROVING QUALITY
TOTALSCORE: MEDICATION (SEE MAR)
BODYLANGUAGE: TENSE, DISTRESSED PACING, FIDGETING
CONSOLABILITY: DISTRACTED OR REASSURED BY VOICE/TOUCH
BREATHING: NORMAL
FACIALEXPRESSION: SAD, FRIGHTENED, FROWN

## 2024-11-09 ASSESSMENT — PAIN - FUNCTIONAL ASSESSMENT: PAIN_FUNCTIONAL_ASSESSMENT: PAINAD (PAIN ASSESSMENT IN ADVANCED DEMENTIA SCALE)

## 2024-11-09 NOTE — CARE PLAN
Problem: Fall/Injury  Goal: Not fall by end of shift  Outcome: Progressing  Goal: Be free from injury by end of the shift  Outcome: Progressing  Goal: Verbalize understanding of personal risk factors for fall in the hospital  Outcome: Progressing  Goal: Verbalize understanding of risk factor reduction measures to prevent injury from fall in the home  Outcome: Progressing     Problem: Skin  Goal: Decreased wound size/increased tissue granulation at next dressing change  Outcome: Progressing  Flowsheets (Taken 11/9/2024 0002)  Decreased wound size/increased tissue granulation at next dressing change:   Protective dressings over bony prominences   Promote sleep for wound healing  Goal: Participates in plan/prevention/treatment measures  Outcome: Progressing  Flowsheets (Taken 11/9/2024 0002)  Participates in plan/prevention/treatment measures:   Elevate heels   Discuss with provider PT/OT consult  Goal: Prevent/manage excess moisture  Outcome: Progressing  Flowsheets (Taken 11/9/2024 0002)  Prevent/manage excess moisture:   Cleanse incontinence/protect with barrier cream   Moisturize dry skin  Goal: Prevent/minimize sheer/friction injuries  Outcome: Progressing  Flowsheets (Taken 11/9/2024 0002)  Prevent/minimize sheer/friction injuries:   Turn/reposition every 2 hours/use positioning/transfer devices   Use pull sheet  Goal: Promote/optimize nutrition  Outcome: Progressing  Flowsheets (Taken 11/9/2024 0002)  Promote/optimize nutrition: Monitor/record intake including meals  Goal: Promote skin healing  Outcome: Progressing  Flowsheets (Taken 11/9/2024 0002)  Promote skin healing:   Turn/reposition every 2 hours/use positioning/transfer devices   Protective dressings over bony prominences

## 2024-11-09 NOTE — PROGRESS NOTES
Attempted to meet with pt to complete discharge planning assessment. Pt is confused and unable to participate with answering assessment questions. Assessment deferred to pt's emergency contacts:  Hema Booth (spouse) [phone] - tried this phone number several times, the line would ring several times before a beeping sound at the end, no option to leave a voice mail.  Colten Booth (son) [phone] - he did not answer, left voice mail message for a return call.    Plan to attempt to speak with emergency contacts another time. Care coordinator will continue to follow for discharge planning needs.    Tristian Michel RN  Transitional Care Coordinator (TCC)  [phone] or [phone]

## 2024-11-09 NOTE — PROGRESS NOTES
Wanda Booth is a 74 y.o. female on day 2 of admission presenting with FADIA (acute kidney injury) (CMS-[facility]).      Subjective   NAEON. This AM, pt is oriented at baseline AAOx1. No new complaints offered    Objective     Last Recorded Vitals  /64   Pulse 79   Temp 36.3 °C (97.3 °F)   Resp 17   Wt 53 kg (116 lb 13.5 oz)   SpO2 96%   Intake/Output last 3 Shifts:    Intake/Output Summary (Last 24 hours) at 11/9/2024 1258  Last data filed at 11/9/2024 0757  Gross per 24 hour   Intake 1612.92 ml   Output 2625 ml   Net -1012.08 ml       Admission Weight  Weight: 72.6 kg (160 lb) (11/07/24 1206)    Daily Weight  11/08/24 : 53 kg (116 lb 13.5 oz)    Image Results  IR placement of nephrostomy catheter  Narrative: Interpreted By:  Satinder Brown,  and Nette Chase   STUDY:  IR  NEPHROSTOMY PLACEMENT; 11/8/2024 4:45 pm      INDICATION:  Signs/Symptoms:FADIA on CKD with b/l hydronephrosis, has R PCNT, c/f  dysfunction. R PCNT exchange vs exchange with L PCNT placement.      COMPARISON:  None.      ACCESSION NUMBER(S):  [id number]      ORDERING CLINICIAN:  RICK PRO      TECHNIQUE:  INTERVENTIONALIST(S):  Dominick Perdue MD      CONSENT:  The patient/patient's POA/next of kin was informed of the nature of  the proposed procedure. The purposes, alternatives, risks, and  benefits were explained and discussed. All questions were answered  and consent was obtained.      RADIATION EXPOSURE:  Fluoroscopy time: 1.6 min  Dose: 16.29 mGy  Dose Area Product (DAP): 2023 mGy*cm^2      SEDATION:  Moderate conscious IV sedation services (supervision of  administration, induction, and maintenance) were provided by the  physician performing the procedure with intravenous fentanyl 25mcg  and versed 0.5mg from 4:08 p.m.-4:38 p.m.. The physician was assisted  by an independent trained observer, an interventional radiology  nurse, in the continuous monitoring of patient level of consciousness  and  physiologic status.      MEDICATION:  None.      TIME OUT:  A time out was performed immediately prior to procedure start with  the interventional team, correctly identifying the patient name, date  of birth, MRN, procedure, anatomy (including marking of site and  side), patient position, procedure consent form, relevant laboratory  and imaging test results, antibiotic administration, safety  precautions, and procedure-specific equipment needs.      COMPLICATIONS:  No immediate adverse events identified.      FINDINGS:  In the prone position, the patient was positioned on the angiography  table. The cutaneous tissues of the bilateral flanks were prepared  and draped in usual sterile manner.      LEFT NEPHROSTOMY TUBE PLACEMENT:      In the prone position, the patient was positioned on the angiography  table. The cutaneous tissues of the bilateral flanks were prepared  and draped in usual sterile manner. Screening evaluation of the left  kidney was performed for direct ultrasound guidance. A posterior  calyx in the inferior pole of the kidney was identified and targeted.  The optimal access site and tract were locally anesthetized with  subcutaneous Lidocaine 1% instillation. Utilizing direct ultrasound  guidance, the targeted calyx was accessed with a 21-gauge Chiba  needle. After confirmation of location, a digital spot ultrasound  image was acquired.      There was return of non-purulent urine through the access needle. The  collecting system was decompressed with aspiration. A urine sample  was collected and sent for laboratory analysis. An antegrade  pyelogram was performed with dilute contrast to facilitate antegrade  access and fluoroscopic guidance.      ANTEGRADE PYELOGRAM FINDINGS:  Half-strength contrast was injected into the renal collecting system  and demonstrated patency without leak. Opacification of the central  renal pelvis was observed demonstrating moderate hydronephrosis.      PERCUTANEOUS  NEPHROSTOMY DRAINAGE CATHETER PLACEMENT:  A 018 [place]rill guidewire was inserted through the access needle.  Utilizing guidewire manipulation, access into the mid ureter was  obtained. With the guidewire left in place to secure access, a  7-Omani coaxial dilator sheath system was placed. The sheath was  advanced into the central renal pelvis. A 035 bentson guidewire was  inserted through the access sheath into the mid ureter to secure  access.      There was subsequent placement of an 8-Omani nephrostomy drainage  catheter over the 035 guidewire. The pigtail was formed in the  central renal pelvis. Diluted contrast injection was performed to  confirm optimal location. The external portions of the catheter were  secured with sterile suture and dressing.      The patient tolerated the procedure without complication.      RIGHT NEPHROSTOMY TUBE EXCHANGE:  ANTEGRADE PYELOGRAM FINDINGS:  Initial  fluoroscopic spot image demonstrates an intact existing  right percutaneous nephrostomy drainage catheter. Half-strength  contrast was injected through the existing catheter and demonstrated  patency without leak. Opacification of the central renal pelvis was  observed.      PROCEDURE:  Appropriate lidocaine 1% local anesthesia was instilled in the  subcutaneous soft tissues.      The existing nephrostomy tube was cut at the hub releasing the inner  suture. An 035 Amplatz guidewire was inserted through the existing  nephrostomy catheter to secure location utilizing intermittent  fluoroscopic guidance. The existing nephrostomy catheter was removed  over the wire. A new 8-Omani x 25cm nephrostomy tube was  subsequently inserted over the guidewire. The guidewire and  nephrostomy inner stylet were removed. The self-forming the pigtail  loop was formed in the central renal pelvis. Follow-up dilute  contrast injection was performed to confirm optimal placement within  the central renal pelvis. The external portions of the  catheter were  secured in place with sterile suture and dressings.      The patient tolerated the procedure without complication.      Impression: 1. Uncomplicated and technically successful left 8 Fr x 25 cm  percutaneous nephrostomy tube placement. The catheter was connected  to external gravity drainage. There was return and identification of  non-purulent urine.  2. Uncomplicated and technically successful right percutaneous  nephrostomy tube exchange - newly placed 8-Tajik x 25cm self-forming  pigtail drainage catheter placement connected to external gravity  drainage. The patient tolerated the procedure well without immediate  complication.      I was present for and/or performed the critical portions of the  procedure and immediately available throughout the entire procedure.      I personally reviewed the image(s) / study and resident  interpretation. I agree with the findings as stated.      Performed and dictated at [facility].      MACRO:  None.      Signed by: Satinder Brown 11/9/2024 12:21 PM  Dictation workstation:   [id number]      Physical Exam  Constitutional:       Comments: Cachetic and ill-appearing, pt very lethargic on exam but responsive    HENT:      Head: Normocephalic.      Comments: Laceration of r temporal face with dried blood      Nose: No rhinorrhea.      Mouth/Throat:      Mouth: Mucous membranes are dry.      Comments: Dark brown plaque on tongue. Poor dentition, only has a few teeth on the bottom low.   Eyes:      General: No scleral icterus.        Right eye: No discharge.         Left eye: No discharge.      Pupils: Pupils are equal, round, and reactive to light.   Cardiovascular:      Rate and Rhythm: Normal rate and regular rhythm.      Heart sounds: No murmur heard.     No friction rub. No gallop.   Pulmonary:      Effort: No respiratory distress.      Breath sounds: No wheezing, rhonchi or rales.   Chest:      Chest wall: No  tenderness.   Abdominal:      General: Bowel sounds are normal. There is no distension.      Palpations: Abdomen is soft.      Tenderness: There is abdominal tenderness.      Comments: PEG   Musculoskeletal:         General: No tenderness.      Right lower leg: No edema.      Left lower leg: No edema.      Comments: Inability to bend knee on command, however had ability to move all extremities and turn head    Skin:     General: Skin is warm and dry.      Coloration: Skin is not jaundiced.      Findings: No rash.   Neurological:      Mental Status: She is disoriented.      Comments: Oriented only to person. Negative babinski. 2+strength in b/l finger        Relevant Results    Labs  Results from last 7 days   Lab Units 11/09/24  0937 11/08/24  1819 11/08/24  0612 11/07/24  1449   SODIUM mmol/L 150* 148* 145 138   POTASSIUM mmol/L 3.6 4.0 4.0 4.6   CHLORIDE mmol/L 120* 118* 115* 102   CO2 mmol/L 22 19* 22 29   BUN mg/dL 39* 48* 53* 65*   CREATININE mg/dL 2.23* 2.53* 2.81* 3.02*   CALCIUM mg/dL 11.7* 12.9* 12.8* 13.6*      Results from last 7 days   Lab Units 11/09/24  0937 11/08/24  0612 11/07/24  1449   HEMOGLOBIN g/dL 8.6* 9.4* 9.5*   WBC AUTO x10*3/uL 20.4* 19.3* 20.9*   PLATELETS AUTO x10*3/uL 367 271 336   HEMATOCRIT % 28.4* 33.1* 29.1*     Results from last 7 days   Lab Units 11/08/24  0612 11/07/24  1449   ALK PHOS U/L 79 102   BILIRUBIN TOTAL mg/dL 0.2 0.2   BILIRUBIN DIRECT mg/dL 0.2  --    PROTEIN TOTAL g/dL 6.1* 6.4   ALT U/L 13 16   AST U/L 21 26      Results from last 7 days   Lab Units 11/08/24  0612   PROTIME seconds 12.6   INR  1.1       Results from last 7 days   Lab Units 11/09/24  1203 11/09/24  0937 11/08/24  1819 11/08/24  1749 11/08/24  1421 11/08/24  0612 11/08/24  0220 11/07/24  1449   POCT GLUCOSE mg/dL 139* 134*  --  82 75  --  93  --    GLUCOSE mg/dL  --  132* 76  --   --  81  --  162*         Relevant Results  Scheduled medications  heparin (porcine), 5,000 Units, subcutaneous,  q8h  piperacillin-tazobactam, 2.25 g, intravenous, q6h      Continuous medications  sodium chloride, 125 mL/hr, Last Rate: 125 mL/hr (11/09/24 0412)      PRN medications  PRN medications: acetaminophen, bisacodyl, polyethylene glycol      CTAP 11/7: IMPRESSION:  1. Severe right and moderate left hydroureteronephrosis. Right  percutaneous nephrostomy tube is in expected position with pigtail in  the right renal pelvis without evidence of discontinuity or kinking.  Comparison with prior outside imaging from 09/24/2024.  2. Marked hazy thickening of the urinary bladder wall with extensive  perivesical nodularity. These may represent collapsed urinary bladder  diverticula or perivesical soft tissue nodularity.  3. Few solid and few cavitary nodules throughout the bilateral lung  bases may relate to malignancy or atypical infectious process.  4. Heterogeneous appearance of the pancreatic body and tail with the  questionable ill-defined hypoattenuating area in the tail and  peripancreatic stranding. Findings can represent   sequela to known  acute pancreatitis. Underlying focal pancreatic mass can not be  excluded. Comparison with prior outside imaging from 09/24/2024 is  recommended.  5. Multiple enlarged pelvic and lower paraesophageal lymph nodes are  presumed metastatic or less likely reactive.  6. Small left and trace right pleural effusions.  7. PEG tube in place with bumper in the gastric body.    This patient has a urinary catheter   Reason for the urinary catheter remaining today? urinary retention/bladder outlet obstruction, acute or chronic      Assessment and Plan:  Ms. Wanda Booth is a 73 yo woman with PMHx b/l hydronephrosis (w/ r nephrostomy tube), chronic carreon, PEG, recently tx for FADIA/sepsis/acute pancreatitis at [facility] and completed course of cipro presenting to  ED 11/7 for recurrent falls and concern for UTI. Pt came from [facility]. In the ED pt found to have FADIA and hypercalcemia currently  tx with fluids. Calvillo replaced in ED and with concern for UTI blood cx and UA with cx ordered. Concern for limited drainage from neph so urology following CTAP with concern for maligancy with mets, and pancreatitis hx noted at [facility]. Pt with AMS per son seems to be for 1 month with more exacerbation in 1 weeks after [facility] hospital stay and 3 falls at SNF. Continue further workup through [facility] normal and CT cervical spine also wnl after fall. Pt started on CTX 11/7 changed to zosyn 11/8. Will monitor for improvement in cr and ca with fluids and consider calcitonin or zoledronic acid.      Updates 11/9  -Ca continues to decrease, currently on 125 ml/hr 1/2 NS  -increasing sodium, FWD 0.9L, will add 200mg FWF q6hr through PEG tube  -bid rfp  -Has outpatient follow-up with [facility] urology on 11/12, will need to reach out to have this rescheduled     Dispo: Patient came to ED from [facility]. PT unable to work with pt because of lethargy, OT rec mod intensity, Son Colten reports upon discharge having pt move to [place] with him for closer monitoring.         #AMS vs #Hypercalcemic Encephalopathy vs #Dementia  #Hypercalcemia  -Ca of 13.6 in ED  -CTH with no acute findings  -Twan Abel endorses pt lived at home with  and could do all ADLs and communicate clearly 1 month ago (10/2024), son lives out of state, recalls for conversation with pt 4-5 days ago (11/4)  -s/p 2L NS in ED  Plan:  -Ca continues to decrease, currently on 125 ml/hr 1/2 NS  -ical elevated, pth pthrp, HIV/Syph neg, TSH elevated, and thyroxine wnl, b12 400s wnl, vit d low 16  -if not improving after fluids will start bisphos but will consider zolendronic acid   -on tele, EKG 11/7 and 11/8 with no acute changes     #Subacute cystitis  #UTI  -previously on cipro from [facility]  #Leukocytosis   -wbc 20.9 in ED  #FADIA on CKD4  -Cr 3.02 on admission baseline 1.1 and elevated BUN 65 in ED  #Chronic Calvillo  -exchanged 11/7 in ED  #r Nephrostomy Tube  #B/L Hydronephrosis    -She has significant FADIA on CKD iso severe b/l hydronephrosis s/p R PCNT 10/3/24. -Bedside ultrasound showed b/l hydro in ED 11/7  -Labs prior to admission Ca 12.4 PTH 17 CKD4 based on Cystatin C of 1.98, baseline - -Pre-alb 9   -received 2L NS in ED  -CTAP very thick bladder wall with pelvic lymphadenopathy (1.3-1.6cm) and BL hydroureteronephrosis R>L despite  #Concern for  Malignancy with mets  -considering cervical vs bladder, continuing to investigate   Plan:  - mL/hr increased to 150 ml/hr 11/8  -UA and cx, blood cx collected 11/7 pending   -Changed CTX (11/7) to zosyn (11/8-xx), urology will do cytoscopy once UTI improved   - Urology following  rec: Maintain R PCNT and carreon catheter              - Trend cr and UOP, if not improving do PCNT replacement with IR L PCNT placement             -IR pelvic LN biopsy ordered 11/8              - Would recommend CTU (with clamped PCNT) for upper tract evaluation once renal function improve or MRU.              - Sent urine cytology 11/8  -ionized ca high ,pth wnl, pthrp pending, urine electrolytes wnl, folate wnl  Utox neg, + UTI workup below, HIV neg. Syph neg. TSH high, b12 wnl, vit d low 16  -CTAP 11/7 findings above  -PET scan scheduled 11/11 10am    #Pancreatitis  Plan:  -tx at [facility], evidence found on CTAP 11/7  -ctm       #PEG  #Failure to thrive  #Poor dentition  #Normocytic Anemia  - Malnourished, prealbumin 9/2024 9.0, now s/p PEG   - C/f underlying malignancy  -poor po intake requiring PEG at [facility] 10/2024  -hgb 9.5, mcv 95  -medical care prior to 2024 was in 2022  Plan:  -nutrition rec: When appropriate, Start Isosource 1.5@ 15 mL/hr.   Advance by 10 mL q 6 hrs to goal rate of 45 mL/hr x 24 hr  -tube feeds started 11/8 monitor refeeding   -prealbumin pending 11/8      #Unspecified Recurrent Falls  -CTHead and CTcervical spine wnl no acute findings  - Falls likely 2/2 hypovolemia iso poor PO intake, potential sepsis  Plan:  -ctm  -on tele  -fall risk  noted in orders, bed alarm needed and bracelet         F NS 125ml/hr, FWF 200cc q6hr  E prn K>4, Mg>2 phos>3, monitor refeeding replete  N tube feeds  A PIV, PEG, Calvillo, Neph tube   DVT ppx subcutaneous hep  Bowel reg miralax prn     Code Status: FULL CODE (pt with/o capacity, confirmed status with son Colten)   NOK: Colten Booth [phone], Torsten Booth () home  (need to establish contact)    Ernesto Clark MD

## 2024-11-09 NOTE — CARE PLAN
The patient's goals for the shift include will be free from falls or injury during the shift.    The clinical goals for the shift include Pt will tolerate tube feed increment during the shift    Problem: Fall/Injury  Goal: Not fall by end of shift  Outcome: Progressing  Goal: Be free from injury by end of the shift  Outcome: Progressing  Goal: Verbalize understanding of personal risk factors for fall in the hospital  Outcome: Progressing  Goal: Verbalize understanding of risk factor reduction measures to prevent injury from fall in the home  Outcome: Progressing     Problem: Skin  Goal: Decreased wound size/increased tissue granulation at next dressing change  Outcome: Progressing  Flowsheets (Taken 11/9/2024 1630)  Decreased wound size/increased tissue granulation at next dressing change:   Protective dressings over bony prominences   Utilize specialty bed per algorithm  Goal: Participates in plan/prevention/treatment measures  Outcome: Progressing  Flowsheets (Taken 11/9/2024 1630)  Participates in plan/prevention/treatment measures:   Discuss with provider PT/OT consult   Elevate heels  Goal: Prevent/manage excess moisture  Outcome: Progressing  Flowsheets (Taken 11/9/2024 1630)  Prevent/manage excess moisture:   Cleanse incontinence/protect with barrier cream   Follow provider orders for dressing changes   Moisturize dry skin  Goal: Prevent/minimize sheer/friction injuries  Outcome: Progressing  Flowsheets (Taken 11/9/2024 1630)  Prevent/minimize sheer/friction injuries:   Turn/reposition every 2 hours/use positioning/transfer devices   Utilize specialty bed per algorithm   Use pull sheet   HOB 30 degrees or less   Complete micro-shifts as needed if patient unable. Adjust patient position to relieve pressure points, not a full turn  Goal: Promote/optimize nutrition  Outcome: Progressing  Flowsheets (Taken 11/9/2024 1630)  Promote/optimize nutrition:   Assist with feeding   Monitor/record intake including  meals  Goal: Promote skin healing  Outcome: Progressing  Flowsheets (Taken 11/9/2024 1630)  Promote skin healing:   Turn/reposition every 2 hours/use positioning/transfer devices   Protective dressings over bony prominences   Assess skin/pad under line(s)/device(s)     Over the shift, the patient did not make progress toward the following goals. Barriers to progression include confusion, restlessness and mobility. Recommendations to address these barriers include redirection, reposition and q2 turns.

## 2024-11-09 NOTE — PROGRESS NOTES
"Wanda Booth is a 74 y.o. female on day 2 of admission presenting with FADIA (acute kidney injury) (CMS-HCC).    Subjective   R PCNT exchanged and L PCNT placed on 11/8       Objective     Physical Exam  General: in no acute distress, non-toxic appearing   : carreon in place draining light pink urine  Respiratory: non labored breathing   Cardiovascular: regular rate per chart  Abdomen: soft, non-distended, non specific tenderness. BL PCNTs draining CYU; carreon CYU  Extremities: no cyanosis, clubbing or edema   Skin: no obvious lesions or rashes   Psych: appropriate mood and behavior    Last Recorded Vitals  Blood pressure 135/74, pulse 88, temperature 36.9 °C (98.4 °F), resp. rate 16, height 1.473 m (4' 10\"), weight 53 kg (116 lb 13.5 oz), SpO2 97%.  Intake/Output last 3 Shifts:  I/O last 3 completed shifts:  In: 3145.4 (59.3 mL/kg) [I.V.:2945.4 (55.6 mL/kg); NG/GT:50; IV Piggyback:150]  Out: 2725 (51.4 mL/kg) [Urine:2725 (1.4 mL/kg/hr)]  Weight: 53 kg     Relevant Results  Scheduled medications  heparin (porcine), 5,000 Units, subcutaneous, q8h  piperacillin-tazobactam, 2.25 g, intravenous, q6h      Continuous medications  sodium chloride, 125 mL/hr, Last Rate: 125 mL/hr (11/09/24 0412)      PRN medications  PRN medications: acetaminophen, bisacodyl, polyethylene glycol    Results for orders placed or performed during the hospital encounter of 11/07/24 (from the past 24 hours)   POCT GLUCOSE   Result Value Ref Range    POCT Glucose 75 74 - 99 mg/dL   POCT GLUCOSE   Result Value Ref Range    POCT Glucose 82 74 - 99 mg/dL   Renal function panel   Result Value Ref Range    Glucose 76 74 - 99 mg/dL    Sodium 148 (H) 136 - 145 mmol/L    Potassium 4.0 3.5 - 5.3 mmol/L    Chloride 118 (H) 98 - 107 mmol/L    Bicarbonate 19 (L) 21 - 32 mmol/L    Anion Gap 15 10 - 20 mmol/L    Urea Nitrogen 48 (H) 6 - 23 mg/dL    Creatinine 2.53 (H) 0.50 - 1.05 mg/dL    eGFR 19 (L) >60 mL/min/1.73m*2    Calcium 12.9 (H) 8.6 - 10.6 mg/dL    " Phosphorus 4.5 2.5 - 4.9 mg/dL    Albumin 2.5 (L) 3.4 - 5.0 g/dL   Magnesium   Result Value Ref Range    Magnesium 2.35 1.60 - 2.40 mg/dL   CBC and Auto Differential   Result Value Ref Range    WBC 20.4 (H) 4.4 - 11.3 x10*3/uL    nRBC 0.0 0.0 - 0.0 /100 WBCs    RBC 2.79 (L) 4.00 - 5.20 x10*6/uL    Hemoglobin 8.6 (L) 12.0 - 16.0 g/dL    Hematocrit 28.4 (L) 36.0 - 46.0 %     (H) 80 - 100 fL    MCH 30.8 26.0 - 34.0 pg    MCHC 30.3 (L) 32.0 - 36.0 g/dL    RDW 17.5 (H) 11.5 - 14.5 %    Platelets 367 150 - 450 x10*3/uL    Neutrophils % 67.1 40.0 - 80.0 %    Immature Granulocytes %, Automated 0.7 0.0 - 0.9 %    Lymphocytes % 17.9 13.0 - 44.0 %    Monocytes % 7.3 2.0 - 10.0 %    Eosinophils % 6.2 0.0 - 6.0 %    Basophils % 0.8 0.0 - 2.0 %    Neutrophils Absolute 13.69 (H) 1.60 - 5.50 x10*3/uL    Immature Granulocytes Absolute, Automated 0.15 0.00 - 0.50 x10*3/uL    Lymphocytes Absolute 3.65 (H) 0.80 - 3.00 x10*3/uL    Monocytes Absolute 1.49 (H) 0.05 - 0.80 x10*3/uL    Eosinophils Absolute 1.26 (H) 0.00 - 0.40 x10*3/uL    Basophils Absolute 0.16 (H) 0.00 - 0.10 x10*3/uL   POCT GLUCOSE   Result Value Ref Range    POCT Glucose 134 (H) 74 - 99 mg/dL       ECG 12 Lead    Result Date: 11/8/2024  Normal sinus rhythm Normal ECG No previous ECGs available See ED provider note for full interpretation and clinical correlation Confirmed by Mary Ann Johnson ([id number]) on 11/8/2024 11:02:20 PM    CT abdomen pelvis wo IV contrast    Result Date: 11/7/2024  Interpreted By:  John Paul Woods,  and Alec So STUDY: CT ABDOMEN PELVIS WO IV CONTRAST;  11/7/2024 6:00 pm   INDICATION: Signs/Symptoms:hydronephrosis, R nephrostomy tube.     COMPARISON: No prior images for comparison. Outside CT of 09/24/2024 is unavailable for review at the time of dictation.   ACCESSION NUMBER(S): [id number]   ORDERING CLINICIAN: RICK PRO   TECHNIQUE: Contiguous axial images of the abdomen and pelvis were obtained in the absence of  intravenous contrast. Coronal and sagittal reformatted images were reconstructed from the axial data.   FINDINGS: LOWER CHEST: Small left and trace right pleural effusions. There are multiple solid pulmonary nodules throughout the bilateral lower lungs, for example clustered nodules in the perihilar left lower lobe measuring up to 0.7 cm (series 205, image 6) and nodules throughout the right middle lobe including a pleural-based nodule measuring up to 0.6 cm (image 15). There are also a few centrally cavitary nodules measuring up to 1.0 cm within the right middle lobe (image 9) and 0.7 cm in the posterior left upper lobe (image 11). The heart is nonenlarged. There are few ill-defined soft tissue densities, possibly enlarged lymph nodes in the lower paraesophageal mediastinum, including a left paraesophageal node measuring 1.4 cm in short axis (series 201, image 23) and a right paraesophageal node measuring 1.1 cm in short axis (image 24). The visualized soft tissues and osseous structures of the lower chest wall are unremarkable.   LIVER: No significant parenchymal abnormality.   BILE DUCTS: No significant intrahepatic or extrahepatic dilatation.   GALLBLADDER: Absent or contracted.   PANCREAS: Pancreas demonstrates heterogeneous attenuation predominantly towards the body and tail. There is a questionable ill-defined hypodensity in the pancreatic tail extending into the splenic hilum. There is associated mild peripancreatic stranding. No significant ductal dilatation.   SPLEEN: Heterogeneous low-density attenuation of the spleen.   ADRENALS: No significant abnormality.   KIDNEYS, URETERS, BLADDER: Right percutaneous nephrostomy tube in place with pigtail coiling in the right renal pelvis. There is severe right hydroureteronephrosis with moderate right renal cortical thinning. Moderate left hydroureteronephrosis. No nephrolithiasis. There is diffuse nodular thickening and haziness of the urinary bladder with a Calvillo  catheter in place. Multiple perivesical nodules or lymph nodes surrounding the right-greater-than-left urinary bladder walls measuring up to 1.5 cm.   REPRODUCTIVE ORGANS: Lippes loop intrauterine device in place.   GI: PEG tube in place. No evidence of obstruction or significant inflammatory bowel wall thickening. Extensive colonic diverticulosis without focal diverticulitis. The appendix is not visualized. No pericecal inflammatory change or secondary signs of acute appendicitis.   VESSELS: No significant abnormality. The portal veins and IVC are patent.   PERITONEUM/RETROPERITONEUM: No ascites, free air, or fluid collection.   LYMPH NODES: There are multiple enlarged pelvic lymph nodes including a node along the left pelvic sidewall measuring up to 1.3 cm, and along the right pelvic sidewall measuring 1.6 cm (series 201, image 113).   ABDOMINAL WALL: No significant abnormality.   OSSEOUS STRUCTURES: No acute osseous abnormality.       1. Severe right and moderate left hydroureteronephrosis. Right percutaneous nephrostomy tube is in expected position with pigtail in the right renal pelvis without evidence of discontinuity or kinking. Comparison with prior outside imaging from 09/24/2024. 2. Marked hazy thickening of the urinary bladder wall with extensive perivesical nodularity. These may represent collapsed urinary bladder diverticula or perivesical soft tissue nodularity. 3. Few solid and few cavitary nodules throughout the bilateral lung bases may relate to malignancy or atypical infectious process. 4. Heterogeneous appearance of the pancreatic body and tail with the questionable ill-defined hypoattenuating area in the tail and peripancreatic stranding. Findings can represent   sequela to known acute pancreatitis. Underlying focal pancreatic mass can not be excluded. Comparison with prior outside imaging from 09/24/2024 is recommended. 5. Multiple enlarged pelvic and lower paraesophageal lymph nodes are  presumed metastatic or less likely reactive. 6. Small left and trace right pleural effusions. 7. PEG tube in place with bumper in the gastric body.   I personally reviewed the image(s)/study and resident interpretation as stated by Dr. Saray Michael MD. I agree with the findings as stated. This study was interpreted at [facility], [place], [place].   MACRO: None   Signed by: John Pauljamila Greenenorberto Woods 11/7/2024 6:55 PM Dictation workstation:   [id number]    Point of Care Ultrasound    Result Date: 11/7/2024  Bertrand Gibson MD     11/7/2024  5:24 PM Performed by: Matt Mathur DO Authorized by: Bertrand Gibson MD  Genitourinary Indications: retention of urine and hematuria Procedure: Renal Ultrasound Findings: Right Kidney: The RIGHT kidney was visualized and was positive for HYDRONEPHROSIS. Left Kidney: The LEFT kidney was visualized and was positive for HYDRONEPHROSIS. Bladder: The bladder was visualized and was DECOMPRESSED. and Catheter in place Impression: Renal: abnormal renal ultrasound Procedure: Bladder Ultrasound Findings: Bladder Visualization: The bladder was visualized and was DECOMPRESSED. Impression: Bladder: The bladder was DECOMPRESSED. Comments: Calvillo catheter balloon in bladder. Percutaneous nephrostomy in right renal pelvis.    CT head wo IV contrast    Addendum Date: 11/7/2024    Interpreted By:  Ramon Sharma and Awan Komal ADDENDUM: Exam finding has been discussed with Dr. Sharath Anand at 2:20PM by resident Blanquita Leavitt.   Signed by: Ramon Sharma 11/7/2024 2:41 PM   -------- ORIGINAL REPORT -------- Dictation workstation:   [id number]    Result Date: 11/7/2024  Interpreted By:  Ramon Sharma and Awan Komal STUDY: CT HEAD WO IV CONTRAST; CT CERVICAL SPINE WO IV CONTRAST;  11/7/2024 1:25 pm   INDICATION: Signs/Symptoms:fall, head strike.     COMPARISON: None.   ACCESSION NUMBER(S): [id number]; [id number]   ORDERING CLINICIAN: SHARATH ANAND   TECHNIQUE:  Noncontrast axial CT scan of head was performed. Angled reformats in brain and bone windows were generated. The images were reviewed in bone, brain, blood and soft tissue windows. Axial CT images of the cervical spine are obtained. Axial, coronal and sagittal reconstructions are provided for review.   FINDINGS: Findings head CT:   Midline structures are intact.   There is no intracranial hemorrhage.   The gray-white matter differentiation is intact there is no evidence of any ischemia.   There are confluent hypodensities in the bilateral cerebral hemisphere, compatible with microangiopathic changes. Mild brain parenchymal volume loss.   There is mild-to-moderate sulci prominence. Moderate dilatation of the lateral and 3rd greater than 4th ventricles at least in part due to global parenchymal volume loss. The basal cisterns are patent. There is no extra-axial fluid collection.   The calvarium is intact.   Visualized paranasal sinuses and mastoids are clear.   Moderate atherosclerotic calcification of the cavernous internal carotid arteries and left V4 vertebral artery noted.     Findings cervical spine CT: Fractures: There is no evidence for an acute fracture of the cervical spine.   Vertebral Alignment: Minimal exaggeration of cervical lordosis. 2 mm of retrolisthesis of C4 on C5. Subtle widening of the anterior spinal line at C4-5. Subtle foreshortening in the anterior-dimension of C5 and C6 with accessory articulation on the left at the C5-6 lateral margin of the vertebral bodies.   Craniocervical Junction: The odontoid process and craniocervical junction are intact.   Vertebrae/Disc Spaces: The cervical vertebral body heights are intact and the disc spaces are preserved. There are mild degenerative endplate changes throughout the cervical spine.   Prevertebral/Paraspinal Soft Tissues: The prevertebral and paraspinal soft tissues are unremarkable.       No CT evidence of acute intracranial hemorrhage, infarct or  mass effect.   No definite posttraumatic abnormality with no evidence for an acute fracture or subluxation of the cervical spine was identified. Subtle widening of the anterior intervertebral disc space at C4-5. Given suggestion of possible congenital deformity of C5 and C6 finding could be incidental on the basis of subtle congenital malformation in this region rather than due to ligamentous injury. Please correlate clinically and if further evaluation were clinically necessary MRI for instance would have added sensitivity and specificity in this regard.   I personally reviewed the images/study and I agree with the findings as stated by Resident Blanquita Leavitt. This study was interpreted at [place], [place].   MACRO: None     Signed by: Ramon Sharma 11/7/2024 2:16 PM Dictation workstation:   [id number]    CT cervical spine wo IV contrast    Addendum Date: 11/7/2024    Interpreted By:  Ramon Sharma and Awan Komal ADDENDUM: Exam finding has been discussed with Dr. Jessica Montelongo at 2:20PM by resident Blanquita Leavitt.   Signed by: Ramon Sharma 11/7/2024 2:41 PM   -------- ORIGINAL REPORT -------- Dictation workstation:   [id number]    Result Date: 11/7/2024  Interpreted By:  Ramon Sharma and Awan Komal STUDY: CT HEAD WO IV CONTRAST; CT CERVICAL SPINE WO IV CONTRAST;  11/7/2024 1:25 pm   INDICATION: Signs/Symptoms:fall, head strike.     COMPARISON: None.   ACCESSION NUMBER(S): [id number]; [id number]   ORDERING CLINICIAN: JESSICA MONTELONGO   TECHNIQUE: Noncontrast axial CT scan of head was performed. Angled reformats in brain and bone windows were generated. The images were reviewed in bone, brain, blood and soft tissue windows. Axial CT images of the cervical spine are obtained. Axial, coronal and sagittal reconstructions are provided for review.   FINDINGS: Findings head CT:   Midline structures are intact.   There is no intracranial hemorrhage.   The gray-white matter differentiation is  intact there is no evidence of any ischemia.   There are confluent hypodensities in the bilateral cerebral hemisphere, compatible with microangiopathic changes. Mild brain parenchymal volume loss.   There is mild-to-moderate sulci prominence. Moderate dilatation of the lateral and 3rd greater than 4th ventricles at least in part due to global parenchymal volume loss. The basal cisterns are patent. There is no extra-axial fluid collection.   The calvarium is intact.   Visualized paranasal sinuses and mastoids are clear.   Moderate atherosclerotic calcification of the cavernous internal carotid arteries and left V4 vertebral artery noted.     Findings cervical spine CT: Fractures: There is no evidence for an acute fracture of the cervical spine.   Vertebral Alignment: Minimal exaggeration of cervical lordosis. 2 mm of retrolisthesis of C4 on C5. Subtle widening of the anterior spinal line at C4-5. Subtle foreshortening in the anterior-dimension of C5 and C6 with accessory articulation on the left at the C5-6 lateral margin of the vertebral bodies.   Craniocervical Junction: The odontoid process and craniocervical junction are intact.   Vertebrae/Disc Spaces: The cervical vertebral body heights are intact and the disc spaces are preserved. There are mild degenerative endplate changes throughout the cervical spine.   Prevertebral/Paraspinal Soft Tissues: The prevertebral and paraspinal soft tissues are unremarkable.       No CT evidence of acute intracranial hemorrhage, infarct or mass effect.   No definite posttraumatic abnormality with no evidence for an acute fracture or subluxation of the cervical spine was identified. Subtle widening of the anterior intervertebral disc space at C4-5. Given suggestion of possible congenital deformity of C5 and C6 finding could be incidental on the basis of subtle congenital malformation in this region rather than due to ligamentous injury. Please correlate clinically and if further  evaluation were clinically necessary MRI for instance would have added sensitivity and specificity in this regard.   I personally reviewed the images/study and I agree with the findings as stated by Resident Blanquita Leavitt. This study was interpreted at [place], [place].   MACRO: None     Signed by: Ramon Sharma 11/7/2024 2:16 PM Dictation workstation:   [id number]    XR abdomen 2 views supine and erect or decub    Result Date: 10/10/2024  * * *Final Report* * * DATE OF EXAM: Oct 10 2024 11:49AM   EUX   [id number]  -  XR ABDOMEN 1V SUPINE  / ACCESSION #  [id number] PROCEDURE REASON: Abdominal pain      * * * * Physician Interpretation * * * * RESULT: ABDOMEN: 10/10/2024 CLINICAL DATA: ABDOMINAL PAIN FINDINGS: A G-tube overlies the left mid abdomen.  Percutaneous drainage catheter overlies the right midabdomen.  Small and large bowel gas pattern within normal limits.  No acute abnormality noted.  Intrauterine device in the mid pelvis.    IMPRESSION: NO ACUTE ABNORMALITY NOTED. Transcribed Using Voice Recognition Transcribe Date/Time: Oct 10 2024 12:44P Dictated by: SAROJ CURTIS MD This examination was interpreted and the report reviewed and electronically signed by:   SAROJ CURTIS MD on Oct 10 2024 12:45PM  EST      Assessment/Plan   Assessment & Plan  FADIA (acute kidney injury) (CMS-[facility])      Wanda Booth is a 74 y.o. female presenting from facility for a trauma evaluation after a fall from ground level and her work up revealed FADIA (cr 3 from 0.8 BL in 2022) very thick bladder wall with pelvic lymphadenopathy (1.3-1.6cm) and BL hydroureteronephrosis R>L despite her current PCNT (placed 9/2024) that was in place on imaging for which urology service was consulted.     Her R PCNT was flushed and start draining likely it was clogged. UA suspicious for UTI.     She had hypercalcemia in September with elevated PTH Related Peptide. Her hypercalcemia was resolved before discharge. She is  now hypercalcemic.    11/8: R PCNT exchanged,  L PCNT placed     Recommendations:  - Maintain BL PCNTs and carreon catheter   - Can flush PCNTs if concern for low output  - Please ensure her carreon catheter was recently exchanged or replace her carreon  - Agree with abx and follow up UC  - Trend cr and UOP  - Can consider pelvic LN biopsy   - Would recommend CTU (with clamped PCNT) for upper tract evaluation once renal function improve or MRU (can be done as outpatient)  - Send urine cytology  - Has outpatient follow-up with [facility] urology on 11/12 - can continue her care with [facility] (may need primary team to help arrange new follow-up)     Discussed with chief resident Dr. Tompkins and attending Dr. Deirdre Dill DO  Urology Resident, PGY-4  Adult Urology: [id number]    Pediatric Urology: [id number]

## 2024-11-10 VITALS
TEMPERATURE: 98.6 F | OXYGEN SATURATION: 95 % | HEIGHT: 58 IN | WEIGHT: 116.84 LBS | RESPIRATION RATE: 15 BRPM | BODY MASS INDEX: 24.53 KG/M2 | DIASTOLIC BLOOD PRESSURE: 47 MMHG | HEART RATE: 80 BPM | SYSTOLIC BLOOD PRESSURE: 104 MMHG

## 2024-11-10 LAB
ABO GROUP (TYPE) IN BLOOD: NORMAL
ALBUMIN SERPL BCP-MCNC: 2.4 G/DL (ref 3.4–5)
ANION GAP SERPL CALC-SCNC: 11 MMOL/L (ref 10–20)
ANTIBODY SCREEN: NORMAL
BACTERIA BLD CULT: NORMAL
BACTERIA BLD CULT: NORMAL
BASOPHILS # BLD AUTO: 0.12 X10*3/UL (ref 0–0.1)
BASOPHILS NFR BLD AUTO: 0.5 %
BUN SERPL-MCNC: 30 MG/DL (ref 6–23)
CALCIUM SERPL-MCNC: 11.1 MG/DL (ref 8.6–10.6)
CHLORIDE SERPL-SCNC: 115 MMOL/L (ref 98–107)
CO2 SERPL-SCNC: 22 MMOL/L (ref 21–32)
CREAT SERPL-MCNC: 1.87 MG/DL (ref 0.5–1.05)
EGFRCR SERPLBLD CKD-EPI 2021: 28 ML/MIN/1.73M*2
EOSINOPHIL # BLD AUTO: 1.51 X10*3/UL (ref 0–0.4)
EOSINOPHIL NFR BLD AUTO: 6.8 %
ERYTHROCYTE [DISTWIDTH] IN BLOOD BY AUTOMATED COUNT: 17.8 % (ref 11.5–14.5)
GLUCOSE BLD MANUAL STRIP-MCNC: 123 MG/DL (ref 74–99)
GLUCOSE BLD MANUAL STRIP-MCNC: 89 MG/DL (ref 74–99)
GLUCOSE SERPL-MCNC: 100 MG/DL (ref 74–99)
HCT VFR BLD AUTO: 27 % (ref 36–46)
HGB BLD-MCNC: 8.2 G/DL (ref 12–16)
IMM GRANULOCYTES # BLD AUTO: 0.14 X10*3/UL (ref 0–0.5)
IMM GRANULOCYTES NFR BLD AUTO: 0.6 % (ref 0–0.9)
LYMPHOCYTES # BLD AUTO: 5.2 X10*3/UL (ref 0.8–3)
LYMPHOCYTES NFR BLD AUTO: 23.5 %
MAGNESIUM SERPL-MCNC: 1.7 MG/DL (ref 1.6–2.4)
MCH RBC QN AUTO: 31.2 PG (ref 26–34)
MCHC RBC AUTO-ENTMCNC: 30.4 G/DL (ref 32–36)
MCV RBC AUTO: 103 FL (ref 80–100)
MONOCYTES # BLD AUTO: 1.4 X10*3/UL (ref 0.05–0.8)
MONOCYTES NFR BLD AUTO: 6.3 %
NEUTROPHILS # BLD AUTO: 13.75 X10*3/UL (ref 1.6–5.5)
NEUTROPHILS NFR BLD AUTO: 62.3 %
NRBC BLD-RTO: 0 /100 WBCS (ref 0–0)
PHOSPHATE SERPL-MCNC: 2.3 MG/DL (ref 2.5–4.9)
PLATELET # BLD AUTO: 376 X10*3/UL (ref 150–450)
POTASSIUM SERPL-SCNC: 3.3 MMOL/L (ref 3.5–5.3)
RBC # BLD AUTO: 2.63 X10*6/UL (ref 4–5.2)
RH FACTOR (ANTIGEN D): NORMAL
SODIUM SERPL-SCNC: 145 MMOL/L (ref 136–145)
WBC # BLD AUTO: 22.1 X10*3/UL (ref 4.4–11.3)

## 2024-11-10 PROCEDURE — 36415 COLL VENOUS BLD VENIPUNCTURE: CPT

## 2024-11-10 PROCEDURE — 2500000002 HC RX 250 W HCPCS SELF ADMINISTERED DRUGS (ALT 637 FOR MEDICARE OP, ALT 636 FOR OP/ED)

## 2024-11-10 PROCEDURE — 86901 BLOOD TYPING SEROLOGIC RH(D): CPT

## 2024-11-10 PROCEDURE — 99233 SBSQ HOSP IP/OBS HIGH 50: CPT

## 2024-11-10 PROCEDURE — 2500000005 HC RX 250 GENERAL PHARMACY W/O HCPCS

## 2024-11-10 PROCEDURE — 2500000001 HC RX 250 WO HCPCS SELF ADMINISTERED DRUGS (ALT 637 FOR MEDICARE OP): Performed by: INTERNAL MEDICINE

## 2024-11-10 PROCEDURE — 2500000001 HC RX 250 WO HCPCS SELF ADMINISTERED DRUGS (ALT 637 FOR MEDICARE OP)

## 2024-11-10 PROCEDURE — 2500000004 HC RX 250 GENERAL PHARMACY W/ HCPCS (ALT 636 FOR OP/ED)

## 2024-11-10 PROCEDURE — 82947 ASSAY GLUCOSE BLOOD QUANT: CPT

## 2024-11-10 PROCEDURE — 84100 ASSAY OF PHOSPHORUS: CPT

## 2024-11-10 PROCEDURE — 85025 COMPLETE CBC W/AUTO DIFF WBC: CPT

## 2024-11-10 PROCEDURE — 83735 ASSAY OF MAGNESIUM: CPT

## 2024-11-10 PROCEDURE — 1200000002 HC GENERAL ROOM WITH TELEMETRY DAILY

## 2024-11-10 RX ORDER — CEFTRIAXONE 1 G/50ML
1 INJECTION, SOLUTION INTRAVENOUS EVERY 24 HOURS
Status: DISPENSED | OUTPATIENT
Start: 2024-11-10

## 2024-11-10 RX ORDER — OXYCODONE HYDROCHLORIDE 5 MG/1
2.5 TABLET ORAL EVERY 8 HOURS PRN
Status: DISPENSED | OUTPATIENT
Start: 2024-11-10

## 2024-11-10 RX ORDER — ACETAMINOPHEN 160 MG/5ML
975 SOLUTION ORAL EVERY 8 HOURS
Status: DISPENSED | OUTPATIENT
Start: 2024-11-10

## 2024-11-10 RX ORDER — QUETIAPINE FUMARATE 25 MG/1
12.5 TABLET, FILM COATED ORAL EVERY 12 HOURS PRN
Status: DISPENSED | OUTPATIENT
Start: 2024-11-10

## 2024-11-10 RX ORDER — ACETAMINOPHEN 500 MG
5 TABLET ORAL NIGHTLY
Status: DISPENSED | OUTPATIENT
Start: 2024-11-10

## 2024-11-10 RX ORDER — ACETAMINOPHEN 325 MG/1
975 TABLET ORAL EVERY 8 HOURS
Status: DISCONTINUED | OUTPATIENT
Start: 2024-11-10 | End: 2024-11-10

## 2024-11-10 ASSESSMENT — COGNITIVE AND FUNCTIONAL STATUS - GENERAL
STANDING UP FROM CHAIR USING ARMS: TOTAL
CLIMB 3 TO 5 STEPS WITH RAILING: TOTAL
MOVING FROM LYING ON BACK TO SITTING ON SIDE OF FLAT BED WITH BEDRAILS: A LOT
DRESSING REGULAR UPPER BODY CLOTHING: A LOT
TURNING FROM BACK TO SIDE WHILE IN FLAT BAD: A LOT
DRESSING REGULAR LOWER BODY CLOTHING: A LOT
WALKING IN HOSPITAL ROOM: TOTAL
TOILETING: TOTAL
HELP NEEDED FOR BATHING: A LOT
MOVING TO AND FROM BED TO CHAIR: A LOT
DAILY ACTIVITIY SCORE: 10
MOBILITY SCORE: 9
PERSONAL GROOMING: A LOT
EATING MEALS: TOTAL

## 2024-11-10 ASSESSMENT — PAIN SCALES - PAIN ASSESSMENT IN ADVANCED DEMENTIA (PAINAD)
NEGVOCALIZATION: REPEATED TROUBLED CALLING OUT, LOUD MOANING/GROANING, CRYING
TOTALSCORE: MEDICATION (SEE MAR)
FACIALEXPRESSION: FACIAL GRIMACING
BREATHING: OCCASIONAL LABORED BREATHING, SHORT PERIOD OF HYPERVENTILATION
TOTALSCORE: 7
TOTALSCORE: MEDICATION (SEE MAR)
BODYLANGUAGE: TENSE, DISTRESSED PACING, FIDGETING
CONSOLABILITY: DISTRACTED OR REASSURED BY VOICE/TOUCH
NEGVOCALIZATION: OCCASIONAL MOAN/GROAN, LOW SPEECH, NEGATIVE/DISAPPROVING QUALITY
CONSOLABILITY: DISTRACTED OR REASSURED BY VOICE/TOUCH
BODYLANGUAGE: TENSE, DISTRESSED PACING, FIDGETING
TOTALSCORE: 6
BREATHING: OCCASIONAL LABORED BREATHING, SHORT PERIOD OF HYPERVENTILATION
FACIALEXPRESSION: FACIAL GRIMACING

## 2024-11-10 ASSESSMENT — PAIN SCALES - GENERAL: PAINLEVEL_OUTOF10: 0 - NO PAIN

## 2024-11-10 ASSESSMENT — PAIN - FUNCTIONAL ASSESSMENT
PAIN_FUNCTIONAL_ASSESSMENT: PAINAD (PAIN ASSESSMENT IN ADVANCED DEMENTIA SCALE)
PAIN_FUNCTIONAL_ASSESSMENT: 0-10

## 2024-11-10 NOTE — PROGRESS NOTES
11/10/24 1416 Transitional Care Coordinator Notes:    Transitional Care Coordination Progress Note:  Patient discussed during interdisciplinary rounds.   Team members present: MD and TCC  Plan per Medical/Surgical team: Per ED notes patient is from [facility], operated by [facility]. Referral placed. She is a resident there under Private Pay. Asked facility to send patient spouse/family contact information.  Payor: [facility]  Discharge disposition: ICF  Potential Barriers: none  ADOD: 2-4 days      Assessment/Plan   Assessment & Plan  FADIA (acute kidney injury) (CMS-HCC)               Dayanna Guerrero RN

## 2024-11-10 NOTE — PROGRESS NOTES
Wanda Booth is a 74 y.o. female on day 3 of admission presenting with FADIA (acute kidney injury) (CMS-[facility]).      Subjective   NAEON. This AM, pt is oriented at baseline AAOx1. Reports suprapubic pain.     Objective     Last Recorded Vitals  /52   Pulse 89   Temp 36.7 °C (98.1 °F)   Resp 16   Wt 53 kg (116 lb 13.5 oz)   SpO2 95%   Intake/Output last 3 Shifts:    Intake/Output Summary (Last 24 hours) at 11/10/2024 1315  Last data filed at 11/10/2024 1200  Gross per 24 hour   Intake 3144 ml   Output 2350 ml   Net 794 ml       Admission Weight  Weight: 72.6 kg (160 lb) (11/07/24 1206)    Daily Weight  11/08/24 : 53 kg (116 lb 13.5 oz)    Image Results  IR placement of nephrostomy catheter  Narrative: Interpreted By:  Satinder Brown,  and Nette Chase   STUDY:  IR  NEPHROSTOMY PLACEMENT; 11/8/2024 4:45 pm      INDICATION:  Signs/Symptoms:FADIA on CKD with b/l hydronephrosis, has R PCNT, c/f  dysfunction. R PCNT exchange vs exchange with L PCNT placement.      COMPARISON:  None.      ACCESSION NUMBER(S):  [id number]      ORDERING CLINICIAN:  RICK PRO      TECHNIQUE:  INTERVENTIONALIST(S):  Dominick Perdue MD      CONSENT:  The patient/patient's POA/next of kin was informed of the nature of  the proposed procedure. The purposes, alternatives, risks, and  benefits were explained and discussed. All questions were answered  and consent was obtained.      RADIATION EXPOSURE:  Fluoroscopy time: 1.6 min  Dose: 16.29 mGy  Dose Area Product (DAP): 2023 mGy*cm^2      SEDATION:  Moderate conscious IV sedation services (supervision of  administration, induction, and maintenance) were provided by the  physician performing the procedure with intravenous fentanyl 25mcg  and versed 0.5mg from 4:08 p.m.-4:38 p.m.. The physician was assisted  by an independent trained observer, an interventional radiology  nurse, in the continuous monitoring of patient level of consciousness  and physiologic  status.      MEDICATION:  None.      TIME OUT:  A time out was performed immediately prior to procedure start with  the interventional team, correctly identifying the patient name, date  of birth, MRN, procedure, anatomy (including marking of site and  side), patient position, procedure consent form, relevant laboratory  and imaging test results, antibiotic administration, safety  precautions, and procedure-specific equipment needs.      COMPLICATIONS:  No immediate adverse events identified.      FINDINGS:  In the prone position, the patient was positioned on the angiography  table. The cutaneous tissues of the bilateral flanks were prepared  and draped in usual sterile manner.      LEFT NEPHROSTOMY TUBE PLACEMENT:      In the prone position, the patient was positioned on the angiography  table. The cutaneous tissues of the bilateral flanks were prepared  and draped in usual sterile manner. Screening evaluation of the left  kidney was performed for direct ultrasound guidance. A posterior  calyx in the inferior pole of the kidney was identified and targeted.  The optimal access site and tract were locally anesthetized with  subcutaneous Lidocaine 1% instillation. Utilizing direct ultrasound  guidance, the targeted calyx was accessed with a 21-gauge Chiba  needle. After confirmation of location, a digital spot ultrasound  image was acquired.      There was return of non-purulent urine through the access needle. The  collecting system was decompressed with aspiration. A urine sample  was collected and sent for laboratory analysis. An antegrade  pyelogram was performed with dilute contrast to facilitate antegrade  access and fluoroscopic guidance.      ANTEGRADE PYELOGRAM FINDINGS:  Half-strength contrast was injected into the renal collecting system  and demonstrated patency without leak. Opacification of the central  renal pelvis was observed demonstrating moderate hydronephrosis.      PERCUTANEOUS NEPHROSTOMY  DRAINAGE CATHETER PLACEMENT:  A 018 Courtland-Mandrill guidewire was inserted through the access needle.  Utilizing guidewire manipulation, access into the mid ureter was  obtained. With the guidewire left in place to secure access, a  7-Persian coaxial dilator sheath system was placed. The sheath was  advanced into the central renal pelvis. A 035 bentson guidewire was  inserted through the access sheath into the mid ureter to secure  access.      There was subsequent placement of an 8-Persian nephrostomy drainage  catheter over the 035 guidewire. The pigtail was formed in the  central renal pelvis. Diluted contrast injection was performed to  confirm optimal location. The external portions of the catheter were  secured with sterile suture and dressing.      The patient tolerated the procedure without complication.      RIGHT NEPHROSTOMY TUBE EXCHANGE:  ANTEGRADE PYELOGRAM FINDINGS:  Initial  fluoroscopic spot image demonstrates an intact existing  right percutaneous nephrostomy drainage catheter. Half-strength  contrast was injected through the existing catheter and demonstrated  patency without leak. Opacification of the central renal pelvis was  observed.      PROCEDURE:  Appropriate lidocaine 1% local anesthesia was instilled in the  subcutaneous soft tissues.      The existing nephrostomy tube was cut at the hub releasing the inner  suture. An 035 Amplatz guidewire was inserted through the existing  nephrostomy catheter to secure location utilizing intermittent  fluoroscopic guidance. The existing nephrostomy catheter was removed  over the wire. A new 8-Persian x 25cm nephrostomy tube was  subsequently inserted over the guidewire. The guidewire and  nephrostomy inner stylet were removed. The self-forming the pigtail  loop was formed in the central renal pelvis. Follow-up dilute  contrast injection was performed to confirm optimal placement within  the central renal pelvis. The external portions of the catheter  were  secured in place with sterile suture and dressings.      The patient tolerated the procedure without complication.      Impression: 1. Uncomplicated and technically successful left 8 Fr x 25 cm  percutaneous nephrostomy tube placement. The catheter was connected  to external gravity drainage. There was return and identification of  non-purulent urine.  2. Uncomplicated and technically successful right percutaneous  nephrostomy tube exchange - newly placed 8-[place] Korean x 25cm self-forming  pigtail drainage catheter placement connected to external gravity  drainage. The patient tolerated the procedure well without immediate  complication.      I was present for and/or performed the critical portions of the  procedure and immediately available throughout the entire procedure.      I personally reviewed the image(s) / study and resident  interpretation. I agree with the findings as stated.      Performed and dictated at [facility].      MACRO:  None.      Signed by: Satinder Brown 11/9/2024 12:21 PM  Dictation workstation:   [id number]      Physical Exam  Constitutional:       Comments: Cachetic and ill-appearing, pt very lethargic on exam but responsive    HENT:      Head: Normocephalic.      Comments: Laceration of r temporal face with dried blood      Nose: No rhinorrhea.      Mouth/Throat:      Mouth: Mucous membranes are dry.      Comments: Dark brown plaque on tongue. Poor dentition, only has a few teeth on the bottom low.   Eyes:      General: No scleral icterus.        Right eye: No discharge.         Left eye: No discharge.      Pupils: Pupils are equal, round, and reactive to light.   Cardiovascular:      Rate and Rhythm: Normal rate and regular rhythm.      Heart sounds: No murmur heard.     No friction rub. No gallop.   Pulmonary:      Effort: No respiratory distress.      Breath sounds: No wheezing, rhonchi or rales.   Chest:      Chest wall: No tenderness.  "  Abdominal:      General: Bowel sounds are normal. There is no distension.      Palpations: Abdomen is soft.      Tenderness: There is abdominal tenderness.      Comments: PEG   Musculoskeletal:         General: No tenderness.      Right lower leg: No edema.      Left lower leg: No edema.      Comments: Inability to bend knee on command, however had ability to move all extremities and turn head    Skin:     General: Skin is warm and dry.      Coloration: Skin is not jaundiced.      Findings: No rash.   Neurological:      Mental Status: She is disoriented.      Comments: Oriented only to person. Negative babinski. 2+strength in b/l finger        Relevant Results    Labs  Results from last 7 days   Lab Units 11/10/24  0755 11/09/24  1905 11/09/24  0937 11/08/24  1819 11/08/24  0612   SODIUM mmol/L 145 145 150* 148* 145   POTASSIUM mmol/L 3.3* 3.3* 3.6 4.0 4.0   CHLORIDE mmol/L 115* 115* 120* 118* 115*   CO2 mmol/L 22 21 22 19* 22   BUN mg/dL 30* 37* 39* 48* 53*   CREATININE mg/dL 1.87* 2.09* 2.23* 2.53* 2.81*   CALCIUM mg/dL 11.1* 11.7* 11.7* 12.9* 12.8*      Results from last 7 days   Lab Units 11/10/24  0754 11/09/24  2236 11/09/24  0937 11/08/24  0612 11/07/24  1449   HEMOGLOBIN g/dL 8.2* 7.8* 8.6* 9.4* 9.5*   WBC AUTO x10*3/uL 22.1* 20.2* 20.4* 19.3* 20.9*   PLATELETS AUTO x10*3/uL 376 366 367 271 336   HEMATOCRIT % 27.0* 25.1* 28.4* 33.1* 29.1*     Results from last 7 days   Lab Units 11/08/24  0612 11/07/24  1449   ALK PHOS U/L 79 102   BILIRUBIN TOTAL mg/dL 0.2 0.2   BILIRUBIN DIRECT mg/dL 0.2  --    PROTEIN TOTAL g/dL 6.1* 6.4   ALT U/L 13 16   AST U/L 21 26      Results from last 7 days   Lab Units 11/08/24  0612   PROTIME seconds 12.6   INR  1.1     No results found for: \"NONUHFIRE\", \"LACTATE\"    Results from last 7 days   Lab Units 11/10/24  1209 11/10/24  0755 11/10/24  0554 11/09/24  2330 11/09/24  1953 11/09/24  1905 11/09/24  1705 11/09/24  1203 11/09/24  0937 11/08/24  1819 11/08/24  1421 " 11/08/24  0612   POCT GLUCOSE mg/dL 89  --  123* 151* 170*  --  162*   < > 134*  --    < >  --    GLUCOSE mg/dL  --  100*  --   --   --  174*  --   --  132* 76  --  81    < > = values in this interval not displayed.         Relevant Results  Scheduled medications  acetaminophen, 1,000 mg, oral, q8h  [Held by provider] cefTRIAXone, 1 g, intravenous, q24h  heparin (porcine), 5,000 Units, subcutaneous, q8h  potassium phosphate, 15 mmol, intravenous, Once      Continuous medications  sodium chloride, 75 mL/hr, Last Rate: Stopped (11/10/24 1241)      PRN medications  PRN medications: bisacodyl, oxyCODONE, polyethylene glycol, QUEtiapine      CTAP 11/7: IMPRESSION:  1. Severe right and moderate left hydroureteronephrosis. Right  percutaneous nephrostomy tube is in expected position with pigtail in  the right renal pelvis without evidence of discontinuity or kinking.  Comparison with prior outside imaging from 09/24/2024.  2. Marked hazy thickening of the urinary bladder wall with extensive  perivesical nodularity. These may represent collapsed urinary bladder  diverticula or perivesical soft tissue nodularity.  3. Few solid and few cavitary nodules throughout the bilateral lung  bases may relate to malignancy or atypical infectious process.  4. Heterogeneous appearance of the pancreatic body and tail with the  questionable ill-defined hypoattenuating area in the tail and  peripancreatic stranding. Findings can represent   sequela to known  acute pancreatitis. Underlying focal pancreatic mass can not be  excluded. Comparison with prior outside imaging from 09/24/2024 is  recommended.  5. Multiple enlarged pelvic and lower paraesophageal lymph nodes are  presumed metastatic or less likely reactive.  6. Small left and trace right pleural effusions.  7. PEG tube in place with bumper in the gastric body.    This patient has a urinary catheter   Reason for the urinary catheter remaining today? urinary retention/bladder outlet  obstruction, acute or chronic      Assessment and Plan:  Ms. Wanda Booth is a 75 yo woman with PMHx b/l hydronephrosis (w/ r nephrostomy tube), chronic carreon, PEG, recently tx for FADIA/sepsis/acute pancreatitis at [facility] and completed course of cipro presenting to  ED 11/7 for recurrent falls and concern for UTI. Pt came from [facility]. In the ED pt found to have FADIA and hypercalcemia currently tx with fluids. Carreon replaced in ED and with concern for UTI blood cx and UA with cx ordered. Concern for limited drainage from neph so urology following CTAP with concern for maligancy with mets, and pancreatitis hx noted at [facility]. Pt with AMS per son seems to be for 1 month with more exacerbation in 1 weeks after [facility] hospital stay and 3 falls at SNF. Continue further workup through [facility] normal and CT cervical spine also wnl after fall. Pt started on CTX 11/7 changed to zosyn 11/8, now back to CTX after neg Ucx. Will monitor for improvement in cr and ca with fluids and consider calcitonin or zoledronic acid.     Updates 11/10  -pt traumatically pulled out carreon 11/8-11/9 ON, replaced by nursing, had blood clots noted however these were improving on flushing, now reporting suprapubic pain, scheduled tyl 975mg TID, oxy 2.5 mg q8hr PRN for mod to severe pain  -added Seroquel 12.5 q8hr prn for sleep on agitation and ordered sitter   -improving Ca and Na, decreased fluid to 75 ml/hr, FWF to 125 ml q6hr  -switched zosyn to CTX, plan for total 5 day course (end date 11/12) for UTI, however held 11/11 dose d/t dextrose given pending PET  -PET and LN biopsy tomorrow, NPO @ [facility], hold TF  -pt has follow up scheduled with [facility] uro, appt is 11/12, will attempt to reschedule tomorrow  [  ]      Updates 11/9  -Ca continues to decrease, currently on 125 ml/hr 1/2 NS  -increasing sodium, FWD 0.9L, will add 200mg FWF q6hr through PEG tube  -bid rfp  -Has outpatient follow-up with [facility] urology on 11/12, will need to reach out to have this  rescheduled     Dispo: Patient came to ED from [facility]. PT unable to work with pt because of lethargy, OT rec mod intensity, Son Colten reports upon discharge having pt move to [place] with him for closer monitoring.      #Agitation  #Pain related to pulling out carreon  -pt traumatically pulled out carreon 11/8-11/9 ON, replaced by nursing, had blood clots noted however these were lightening on flushing, now reporting suprapubic pain, scheduled tyl 975mg TID, oxy 2.5 mg q8hr PRN for mod to severe pain  -added Seroquel 12.5 q8hr prn for sleep on agitation and ordered sitter      #AMS vs #Hypercalcemic Encephalopathy vs #Dementia  #Hypercalcemia  -Ca of 13.6 in ED  -CTH with no acute findings  -Son Colten endorses pt lived at home with  and could do all ADLs and communicate clearly 1 month ago (10/2024), son [place] out of state, recalls for conversation with pt 4-5 days ago (11/4)  -s/p 2L NS in ED  Plan:  -Ca continues to decrease, currently on 75 ml/hr 1/2 NS  -ical elevated, pth pthrp, HIV/Syph neg, TSH elevated, and thyroxine wnl, b12 400s wnl, vit d low 16  -if not improving after fluids will start bisphos but will consider zolendronic acid   -on tele, EKG 11/7 and 11/8 with no acute changes     #Subacute cystitis  #UTI  -previously on cipro from [facility]  #Leukocytosis   -wbc 20.9 in ED  #FADIA on CKD4  -Cr 3.02 on admission baseline 1.1 and elevated BUN 65 in ED  #Chronic Carreon  -exchanged 11/7 in ED  #r Nephrostomy Tube  #B/L Hydronephrosis   -She has significant FADIA on CKD iso severe b/l hydronephrosis s/p R PCNT 10/3/24. -Bedside ultrasound showed b/l hydro in ED 11/7  -Labs prior to admission Ca 12.4 PTH 17 CKD4 based on Cystatin C of 1.98, baseline - -Pre-alb 9   -received 2L NS in ED  -CTAP very thick bladder wall with pelvic lymphadenopathy (1.3-1.6cm) and BL hydroureteronephrosis R>L despite  #Concern for  Malignancy with mets  -considering cervical vs bladder, continuing to investigate   Plan:  -UA  and cx, blood cx collected 11/7 negative  -Changed CTX (11/7) to zosyn (11/8-11/10), CTX 11/10-, plan for total 5 day course to 11/12   - Urology following  rec: Maintain R PCNT and carreon catheter              - Trend cr and UOP, if not improving do PCNT replacement with IR L PCNT placement             -IR pelvic LN biopsy ordered 11/8              - Would recommend CTU (with clamped PCNT) for upper tract evaluation once renal function improve or MRU.              - Sent urine cytology 11/8, negative                -pt has follow up scheduled with [facility] uro, appt is 11/12, will attempt to reschedule tomorrow   -ionized ca high ,pth wnl, pthrp pending, urine electrolytes wnl, folate wnl  Utox neg, + UTI workup below, HIV neg. Syph neg. TSH high, b12 wnl, vit d low 16  -CTAP 11/7 findings above  -PET scan scheduled 11/11 10am    #Pancreatitis  Plan:  -tx at [facility], evidence found on CTAP 11/7  -ctm       #PEG  #Failure to thrive  #Poor dentition  #Normocytic Anemia  - Malnourished, prealbumin 9/2024 9.0, now s/p PEG   - C/f underlying malignancy  -poor po intake requiring PEG at [facility] 10/2024  -hgb 9.5, mcv 95  -medical care prior to 2024 was in 2022  Plan:  -nutrition rec: When appropriate, Start Isosource 1.5@ 15 mL/hr.   Advance by 10 mL q 6 hrs to goal rate of 45 mL/hr x 24 hr  -tube feeds started 11/8 monitor refeeding   -prealbumin pending 11/8      #Unspecified Recurrent Falls  -CTHead and CTcervical spine wnl no acute findings  - Falls likely 2/2 hypovolemia iso poor PO intake, potential sepsis  Plan:  -ctm  -on tele  -fall risk noted in orders, bed alarm needed and bracelet         F NS 125ml/hr, FWF 200cc q6hr  E prn K>4, Mg>2 phos>3, monitor refeeding replete  N tube feeds  A PIV, PEG, Carreon, Neph tube   DVT ppx subcutaneous hep  Bowel reg miralax prn     Code Status: FULL CODE (pt with/o capacity, confirmed status with son Colten)   NOK: Colten Booth [phone], Torsten Booth () home  (need to  establish contact)    Ernesto Clark MD

## 2024-11-11 ENCOUNTER — APPOINTMENT (OUTPATIENT)
Dept: RADIOLOGY | Facility: HOSPITAL | Age: 74
End: 2024-11-11
Payer: COMMERCIAL

## 2024-11-11 LAB
ALBUMIN SERPL BCP-MCNC: 2 G/DL (ref 3.4–5)
ALBUMIN SERPL BCP-MCNC: 2.1 G/DL (ref 3.4–5)
ANION GAP SERPL CALC-SCNC: 11 MMOL/L (ref 10–20)
ANION GAP SERPL CALC-SCNC: 11 MMOL/L (ref 10–20)
BACTERIA BLD CULT: NORMAL
BACTERIA BLD CULT: NORMAL
BASOPHILS # BLD AUTO: 0.11 X10*3/UL (ref 0–0.1)
BASOPHILS NFR BLD AUTO: 0.5 %
BUN SERPL-MCNC: 23 MG/DL (ref 6–23)
BUN SERPL-MCNC: 28 MG/DL (ref 6–23)
CALCIUM SERPL-MCNC: 10.5 MG/DL (ref 8.6–10.6)
CALCIUM SERPL-MCNC: 10.9 MG/DL (ref 8.6–10.6)
CHLORIDE SERPL-SCNC: 109 MMOL/L (ref 98–107)
CHLORIDE SERPL-SCNC: 112 MMOL/L (ref 98–107)
CO2 SERPL-SCNC: 22 MMOL/L (ref 21–32)
CO2 SERPL-SCNC: 22 MMOL/L (ref 21–32)
CREAT SERPL-MCNC: 1.26 MG/DL (ref 0.5–1.05)
CREAT SERPL-MCNC: 1.53 MG/DL (ref 0.5–1.05)
EGFRCR SERPLBLD CKD-EPI 2021: 36 ML/MIN/1.73M*2
EGFRCR SERPLBLD CKD-EPI 2021: 45 ML/MIN/1.73M*2
EOSINOPHIL # BLD AUTO: 1.79 X10*3/UL (ref 0–0.4)
EOSINOPHIL NFR BLD AUTO: 8.3 %
ERYTHROCYTE [DISTWIDTH] IN BLOOD BY AUTOMATED COUNT: 17.5 % (ref 11.5–14.5)
GLUCOSE BLD MANUAL STRIP-MCNC: 100 MG/DL (ref 74–99)
GLUCOSE BLD MANUAL STRIP-MCNC: 142 MG/DL (ref 74–99)
GLUCOSE BLD MANUAL STRIP-MCNC: 94 MG/DL (ref 74–99)
GLUCOSE SERPL-MCNC: 104 MG/DL (ref 74–99)
GLUCOSE SERPL-MCNC: 133 MG/DL (ref 74–99)
HCT VFR BLD AUTO: 23.6 % (ref 36–46)
HGB BLD-MCNC: 7.7 G/DL (ref 12–16)
IMM GRANULOCYTES # BLD AUTO: 0.19 X10*3/UL (ref 0–0.5)
IMM GRANULOCYTES NFR BLD AUTO: 0.9 % (ref 0–0.9)
LABORATORY COMMENT REPORT: NORMAL
LABORATORY COMMENT REPORT: NORMAL
LYMPHOCYTES # BLD AUTO: 4.99 X10*3/UL (ref 0.8–3)
LYMPHOCYTES NFR BLD AUTO: 23 %
MAGNESIUM SERPL-MCNC: 1.59 MG/DL (ref 1.6–2.4)
MAGNESIUM SERPL-MCNC: 3.15 MG/DL (ref 1.6–2.4)
MCH RBC QN AUTO: 31.6 PG (ref 26–34)
MCHC RBC AUTO-ENTMCNC: 32.6 G/DL (ref 32–36)
MCV RBC AUTO: 97 FL (ref 80–100)
MONOCYTES # BLD AUTO: 1.31 X10*3/UL (ref 0.05–0.8)
MONOCYTES NFR BLD AUTO: 6.1 %
NEUTROPHILS # BLD AUTO: 13.26 X10*3/UL (ref 1.6–5.5)
NEUTROPHILS NFR BLD AUTO: 61.2 %
NRBC BLD-RTO: 0.1 /100 WBCS (ref 0–0)
PATH REPORT.FINAL DX SPEC: NORMAL
PATH REPORT.GROSS SPEC: NORMAL
PATH REPORT.RELEVANT HX SPEC: NORMAL
PATH REPORT.TOTAL CANCER: NORMAL
PHOSPHATE SERPL-MCNC: 3 MG/DL (ref 2.5–4.9)
PHOSPHATE SERPL-MCNC: 3.3 MG/DL (ref 2.5–4.9)
PLATELET # BLD AUTO: 374 X10*3/UL (ref 150–450)
POTASSIUM SERPL-SCNC: 3.2 MMOL/L (ref 3.5–5.3)
POTASSIUM SERPL-SCNC: 3.7 MMOL/L (ref 3.5–5.3)
RBC # BLD AUTO: 2.44 X10*6/UL (ref 4–5.2)
SODIUM SERPL-SCNC: 138 MMOL/L (ref 136–145)
SODIUM SERPL-SCNC: 142 MMOL/L (ref 136–145)
WBC # BLD AUTO: 21.7 X10*3/UL (ref 4.4–11.3)

## 2024-11-11 PROCEDURE — 85025 COMPLETE CBC W/AUTO DIFF WBC: CPT

## 2024-11-11 PROCEDURE — 2500000004 HC RX 250 GENERAL PHARMACY W/ HCPCS (ALT 636 FOR OP/ED)

## 2024-11-11 PROCEDURE — 97162 PT EVAL MOD COMPLEX 30 MIN: CPT | Mod: GP

## 2024-11-11 PROCEDURE — 97530 THERAPEUTIC ACTIVITIES: CPT | Mod: GP

## 2024-11-11 PROCEDURE — 2500000001 HC RX 250 WO HCPCS SELF ADMINISTERED DRUGS (ALT 637 FOR MEDICARE OP)

## 2024-11-11 PROCEDURE — 80069 RENAL FUNCTION PANEL: CPT

## 2024-11-11 PROCEDURE — 36415 COLL VENOUS BLD VENIPUNCTURE: CPT

## 2024-11-11 PROCEDURE — 83735 ASSAY OF MAGNESIUM: CPT

## 2024-11-11 PROCEDURE — 78816 PET IMAGE W/CT FULL BODY: CPT | Mod: PI

## 2024-11-11 PROCEDURE — 83735 ASSAY OF MAGNESIUM: CPT | Performed by: NUTRITIONIST

## 2024-11-11 PROCEDURE — 82947 ASSAY GLUCOSE BLOOD QUANT: CPT

## 2024-11-11 PROCEDURE — 99233 SBSQ HOSP IP/OBS HIGH 50: CPT | Performed by: NUTRITIONIST

## 2024-11-11 PROCEDURE — 2500000001 HC RX 250 WO HCPCS SELF ADMINISTERED DRUGS (ALT 637 FOR MEDICARE OP): Performed by: INTERNAL MEDICINE

## 2024-11-11 PROCEDURE — 36415 COLL VENOUS BLD VENIPUNCTURE: CPT | Performed by: NUTRITIONIST

## 2024-11-11 PROCEDURE — 3430000001 HC RX 343 DIAGNOSTIC RADIOPHARMACEUTICALS: Performed by: INTERNAL MEDICINE

## 2024-11-11 PROCEDURE — A9552 F18 FDG: HCPCS | Performed by: INTERNAL MEDICINE

## 2024-11-11 PROCEDURE — 2500000002 HC RX 250 W HCPCS SELF ADMINISTERED DRUGS (ALT 637 FOR MEDICARE OP, ALT 636 FOR OP/ED): Performed by: NUTRITIONIST

## 2024-11-11 PROCEDURE — 2500000004 HC RX 250 GENERAL PHARMACY W/ HCPCS (ALT 636 FOR OP/ED): Performed by: NUTRITIONIST

## 2024-11-11 PROCEDURE — 80069 RENAL FUNCTION PANEL: CPT | Performed by: NUTRITIONIST

## 2024-11-11 PROCEDURE — 1200000002 HC GENERAL ROOM WITH TELEMETRY DAILY

## 2024-11-11 RX ORDER — QUETIAPINE FUMARATE 25 MG/1
12.5 TABLET, FILM COATED ORAL ONCE AS NEEDED
Status: DISCONTINUED | OUTPATIENT
Start: 2024-11-11 | End: 2024-11-11

## 2024-11-11 RX ORDER — QUETIAPINE FUMARATE 25 MG/1
12.5 TABLET, FILM COATED ORAL NIGHTLY
Status: DISCONTINUED | OUTPATIENT
Start: 2024-11-11 | End: 2024-11-16 | Stop reason: HOSPADM

## 2024-11-11 RX ORDER — MAGNESIUM SULFATE HEPTAHYDRATE 40 MG/ML
4 INJECTION, SOLUTION INTRAVENOUS ONCE
Status: COMPLETED | OUTPATIENT
Start: 2024-11-11 | End: 2024-11-11

## 2024-11-11 RX ORDER — OXYCODONE HYDROCHLORIDE 5 MG/1
2.5 TABLET ORAL EVERY 6 HOURS PRN
Status: DISCONTINUED | OUTPATIENT
Start: 2024-11-11 | End: 2024-11-13

## 2024-11-11 RX ORDER — POTASSIUM CHLORIDE 14.9 MG/ML
20 INJECTION INTRAVENOUS 2 TIMES DAILY
Status: COMPLETED | OUTPATIENT
Start: 2024-11-11 | End: 2024-11-11

## 2024-11-11 RX ORDER — FLUDEOXYGLUCOSE F 18 200 MCI/ML
10.8 INJECTION, SOLUTION INTRAVENOUS
Status: COMPLETED | OUTPATIENT
Start: 2024-11-11 | End: 2024-11-11

## 2024-11-11 ASSESSMENT — COGNITIVE AND FUNCTIONAL STATUS - GENERAL
WALKING IN HOSPITAL ROOM: TOTAL
WALKING IN HOSPITAL ROOM: TOTAL
DRESSING REGULAR UPPER BODY CLOTHING: A LOT
STANDING UP FROM CHAIR USING ARMS: TOTAL
TURNING FROM BACK TO SIDE WHILE IN FLAT BAD: A LOT
MOBILITY SCORE: 8
MOVING FROM LYING ON BACK TO SITTING ON SIDE OF FLAT BED WITH BEDRAILS: A LOT
CLIMB 3 TO 5 STEPS WITH RAILING: TOTAL
MOVING FROM LYING ON BACK TO SITTING ON SIDE OF FLAT BED WITH BEDRAILS: A LOT
MOVING TO AND FROM BED TO CHAIR: A LOT
STANDING UP FROM CHAIR USING ARMS: TOTAL
DAILY ACTIVITIY SCORE: 10
PERSONAL GROOMING: A LOT
HELP NEEDED FOR BATHING: A LOT
TOILETING: TOTAL
EATING MEALS: TOTAL
MOVING TO AND FROM BED TO CHAIR: TOTAL
TURNING FROM BACK TO SIDE WHILE IN FLAT BAD: A LOT
MOBILITY SCORE: 9
CLIMB 3 TO 5 STEPS WITH RAILING: TOTAL
DRESSING REGULAR LOWER BODY CLOTHING: A LOT

## 2024-11-11 ASSESSMENT — PAIN - FUNCTIONAL ASSESSMENT
PAIN_FUNCTIONAL_ASSESSMENT: 0-10

## 2024-11-11 ASSESSMENT — PAIN SCALES - GENERAL
PAINLEVEL_OUTOF10: 0 - NO PAIN

## 2024-11-11 ASSESSMENT — ACTIVITIES OF DAILY LIVING (ADL): LACK_OF_TRANSPORTATION: NO

## 2024-11-11 ASSESSMENT — PAIN SCALES - WONG BAKER: WONGBAKER_NUMERICALRESPONSE: NO HURT

## 2024-11-11 NOTE — PROGRESS NOTES
Wanda Booth is a 74 y.o. female on day 4 of admission presenting with FADIA (acute kidney injury) (CMS-[facility]).      Subjective   NAEON. This AM, pt is AAOx1. Reports no pain and minimal appetite. Planned for PET Scan 11/11. Discussed dispo plan with son Colten and  Torsten, who plan for pt to immediately go to [place] on discharge to receive SNF workup and home health in [place], Colten ideally needs 4 days notice prior to discharge to coordinate flight to [place].  Appointment at [facility] for urology 11/26 at 1pm scheduled and [facility] neph tube replacement appointment 11/14 canceled (because pt got r pcnt replaced and left placed 11/8). PCNT [facility] contact Inga Ryan .    In 2.3 L Out 2L Net +0.3L and 3x    Objective     Last Recorded Vitals  /77   Pulse 79   Temp 36.6 °C (97.9 °F)   Resp 16   Wt 53 kg (116 lb 13.5 oz)   SpO2 98%   Intake/Output last 3 Shifts:    Intake/Output Summary (Last 24 hours) at 11/11/2024 1522  Last data filed at 11/11/2024 1417  Gross per 24 hour   Intake 926 ml   Output 1525 ml   Net -599 ml       Admission Weight  Weight: 72.6 kg (160 lb) (11/07/24 1206)    Daily Weight  11/08/24 : 53 kg (116 lb 13.5 oz)    Image Results  Electrocardiogram, 12-lead PRN ACS symptoms  Normal sinus rhythm  Inferior infarct , age undetermined  Abnormal ECG  When compared with ECG of 07-NOV-2024 20:02,  No significant change was found      Physical Exam  Constitutional:       Appearance: She is not diaphoretic.      Comments: Cachetic and ill-appearing    HENT:      Head: Normocephalic.      Comments: Laceration of r temporal face with dried blood      Nose: No rhinorrhea.      Mouth/Throat:      Mouth: Mucous membranes are dry.      Comments: Dark brown plaque on tongue. Poor dentition, only has a few teeth on the bottom low.   Eyes:      General: No scleral icterus.        Right eye: No discharge.         Left eye: No discharge.      Pupils: Pupils are equal, round, and reactive to light.    Cardiovascular:      Rate and Rhythm: Normal rate and regular rhythm.      Heart sounds: No murmur heard.     No friction rub. No gallop.   Pulmonary:      Effort: No respiratory distress.      Breath sounds: No wheezing, rhonchi or rales.   Chest:      Chest wall: No tenderness.   Abdominal:      General: Bowel sounds are normal. There is no distension.      Palpations: Abdomen is soft.      Tenderness: There is abdominal tenderness.      Comments: PEG. Suprapubic pain   Genitourinary:     Comments: B/l neph tubes and a carreon   Musculoskeletal:         General: No tenderness.      Right lower leg: No edema.      Left lower leg: No edema.      Comments: ability to move all extremities and turn head    Skin:     General: Skin is warm and dry.      Coloration: Skin is not jaundiced.      Findings: No rash.   Neurological:      Mental Status: She is disoriented.      Comments: Oriented only to person. Negative babinski. 2+strength in b/l finger        Relevant Results    Labs  Results from last 7 days   Lab Units 11/10/24  0755 11/09/24  1905 11/09/24  0937 11/08/24  1819 11/08/24  0612   SODIUM mmol/L 145 145 150* 148* 145   POTASSIUM mmol/L 3.3* 3.3* 3.6 4.0 4.0   CHLORIDE mmol/L 115* 115* 120* 118* 115*   CO2 mmol/L 22 21 22 19* 22   BUN mg/dL 30* 37* 39* 48* 53*   CREATININE mg/dL 1.87* 2.09* 2.23* 2.53* 2.81*   CALCIUM mg/dL 11.1* 11.7* 11.7* 12.9* 12.8*      Results from last 7 days   Lab Units 11/10/24  0754 11/09/24  2236 11/09/24  0937 11/08/24  0612 11/07/24  1449   HEMOGLOBIN g/dL 8.2* 7.8* 8.6* 9.4* 9.5*   WBC AUTO x10*3/uL 22.1* 20.2* 20.4* 19.3* 20.9*   PLATELETS AUTO x10*3/uL 376 366 367 271 336   HEMATOCRIT % 27.0* 25.1* 28.4* 33.1* 29.1*     Results from last 7 days   Lab Units 11/08/24  0612 11/07/24  1449   ALK PHOS U/L 79 102   BILIRUBIN TOTAL mg/dL 0.2 0.2   BILIRUBIN DIRECT mg/dL 0.2  --    PROTEIN TOTAL g/dL 6.1* 6.4   ALT U/L 13 16   AST U/L 21 26      Results from last 7 days   Lab Units  "11/08/24  0612   PROTIME seconds 12.6   INR  1.1     No results found for: \"NONUHFIRE\", \"LACTATE\"    Results from last 7 days   Lab Units 11/10/24  1209 11/10/24  0755 11/10/24  0554 11/09/24  2330 11/09/24  1953 11/09/24  1905 11/09/24  1705 11/09/24  1203 11/09/24  0937 11/08/24  1819 11/08/24  1421 11/08/24  0612   POCT GLUCOSE mg/dL 89  --  123* 151* 170*  --  162*   < > 134*  --    < >  --    GLUCOSE mg/dL  --  100*  --   --   --  174*  --   --  132* 76  --  81    < > = values in this interval not displayed.         Relevant Results  Scheduled medications  acetaminophen, 1,000 mg, oral, q8h  cefTRIAXone, 1 g, intravenous, q24h  heparin (porcine), 5,000 Units, subcutaneous, q8h  magnesium sulfate, 4 g, intravenous, Once  melatonin, 5 mg, oral, Nightly  potassium chloride, 20 mEq, intravenous, BID  QUEtiapine, 12.5 mg, oral, Nightly      Continuous medications  sodium chloride, 75 mL/hr, Last Rate: 75 mL/hr (11/11/24 0847)      PRN medications  PRN medications: bisacodyl, oxyCODONE, polyethylene glycol, QUEtiapine      CTAP 11/7: IMPRESSION:  1. Severe right and moderate left hydroureteronephrosis. Right  percutaneous nephrostomy tube is in expected position with pigtail in  the right renal pelvis without evidence of discontinuity or kinking.  Comparison with prior outside imaging from 09/24/2024.  2. Marked hazy thickening of the urinary bladder wall with extensive  perivesical nodularity. These may represent collapsed urinary bladder  diverticula or perivesical soft tissue nodularity.  3. Few solid and few cavitary nodules throughout the bilateral lung  bases may relate to malignancy or atypical infectious process.  4. Heterogeneous appearance of the pancreatic body and tail with the  questionable ill-defined hypoattenuating area in the tail and  peripancreatic stranding. Findings can represent   sequela to known  acute pancreatitis. Underlying focal pancreatic mass can not be  excluded. Comparison with prior " outside imaging from 09/24/2024 is  recommended.  5. Multiple enlarged pelvic and lower paraesophageal lymph nodes are  presumed metastatic or less likely reactive.  6. Small left and trace right pleural effusions.  7. PEG tube in place with bumper in the gastric body.    This patient has a urinary catheter   Reason for the urinary catheter remaining today? urinary retention/bladder outlet obstruction, acute or chronic      Assessment and Plan:  Ms. Wanda Booth is a 73 yo woman with PMHx b/l hydronephrosis (w/ r nephrostomy tube), chronic carreon, PEG, recently tx for FADIA/sepsis/acute pancreatitis at [facility] and completed course of cipro presenting to  ED 11/7 for recurrent falls and concern for UTI. Pt came from [facility]. In the ED pt found to have FADIA and hypercalcemia currently tx with fluids. Carreon replaced in ED and with concern for UTI blood cx and UA with cx ordered. Concern for limited drainage from neph so urology following CTAP with concern for maligancy with mets, and pancreatitis hx noted at F. Pt with AMS per son seems to be for 1 month with more exacerbation in 1 week after [facility] hospital stay and 3 falls at SNF. Continue further workup though CTH normal and CT cervical spine also wnl after fall. Pt started on CTX 11/7 changed to zosyn 11/8, now back to CTX 11/10 after neg Ucx. Will monitor for improvement in cr and ca with fluids and consider calcitonin or zoledronic acid for hyperca.     Dispo: Patient came to ED from [facility]. PT/OT rec SNF. Discussed dispo plan with son Colten and  Torsten 11/11, who plan for pt to immediately move to [place] on discharge to receive SNF care and home health while living with Colten (Colten's wife is a CNA and works in home health and family has a wealth of nursing in their support system in [place]). Colten ideally needs 4 days notice prior to discharge to coordinate flight to [place] and back to [place] and facility placement.      Follow up appts: Appointment at  [facility] for [facility] 11/26 at 1pm scheduled and [facility] neph tube replacement appointment 11/14 canceled (because pt got r pcnt replaced and left placed 11/8). PCNT [facility] contact Inga Ryan . Outpt urology follow up will need to be scheduled in [place], pending Colten's preference for hospital system for establishing care.     Updates:  -PET Scan 11/11, follow up results  -traumatic carreon pull and replacement 11/8-11/9, blood clots improving on flushing,   -Pain reg (suprapubic) scheduled tyl 975mg TID, oxy 2.5 mg q8hr PRN for mod to severe pain  -changed Seroquel 12.5 q8hr prn to at bedtime 11/11 for sleep on agitation and continue sitter   -blood cx 11/7 prelim NGTD  -improving Ca and Na, IVF 75 ml/hr converted to FWF to 400 ml q6hr  -switched zosyn (11/8-11/9) to CTX (11/7, 11/10-11/12), plan for total 5 day course for UTI  -LN biopsy 11/12, NPO @ MN, hold TF   -free water deficient 0.3 L  -bid rfp/mg, monitor for refeeding and replete as needed    -dispo and appts as above  -wound care orders updated for sacral wound     #Agitation  #Pain related to pulling out carreon  Plan:  -traumatic carreon pull and replacement 11/8-11/9, blood clots improving on flushing,   -Pain reg (suprapubic) scheduled tyl 975mg TID, oxy 2.5 mg q8hr PRN for mod to severe pain  -changed Seroquel 12.5 q8hr prn to at bedtime 11/11 for sleep on agitation and continue sitter      #AMS from infection vs #Hypercalcemic Encephalopathy vs #Dementia  -most likely hypercalcemic encephalopathy or infection related AMS in addition to dementia as pt mental status improving since admission with fluids/antibiotics   #Hypercalcemia, improving  -Ca of 13.6 in ED  -CTH with no acute findings  -Son Colten endorses pt lived at home with  and could do all ADLs and communicate clearly 1 month ago (10/2024), [place] out of state, recalls for conversation with pt 4-5 days ago (11/4)  -s/p 2L NS in ED  -on tele, EKG 11/7 and 11/8 with no acute changes   -ical  elevated, pth wnl, pthrp, HIV/Syph neg, TSH elevated, and thyroxine wnl, b12 400s wnl, vit d low 16  Plan:  -pthrp pending  -if not improving after fluids will start bisphos but will consider zolendronic acid   -PET Scan 11/11, follow up results  -changed Seroquel 12.5 q8hr prn to at bedtime 11/11 for sleep on agitation and continue sitter   -infectious and malignant workup as below   -improving Ca and Na, IVF 75 ml/hr converted to FWF to 400 ml q6hr  -dispo and appts as above    #Subacute cystitis  #UTI, improving  -[facility]  #Leukocytosis, improving   -wbc 20.9 in ED  #FADIA on CKD4  -Cr 3.02 on admission baseline 1.1 and elevated BUN 65 in ED  #Chronic Carreon, stable  -exchanged 11/7 in ED  #Traumatic Carreon Replacement, improving   #B/L Nephrostomy Tube, stable  #B/L Hydronephrosis   -She has significant FADIA on CKD iso severe b/l hydronephrosis s/p R PCNT 10/3/24. -Bedside ultrasound showed b/l hydro in ED 11/7  -Labs prior to admission Ca 12.4 PTH 17 CKD4 based on Cystatin C of 1.98, baseline - -Pre-alb 9   -received 2L NS in ED  -CTAP very thick bladder wall with pelvic lymphadenopathy (1.3-1.6cm) and BL hydroureteronephrosis R>L despite  #Concern for  Malignancy with mets  -considering cervical vs bladder, continuing to investigate   -UA and cx negative, urine cytology 11/8 showed no malignant cells  -ionized ca high ,pth wnl, urine electrolytes wnl, folate wnl  -Utox neg, + UTI workup below, HIV neg. Syph neg. TSH high, b12 wnl, vit d low 16  Plan:  - Urology following  rec: Maintain B/L PCNT (11/8) and carreon catheter              - Would recommend CTU (with clamped PCNT) for upper tract evaluation once renal function improve or MRU.  - pthrp pending  -PET Scan 11/11, follow up results  -traumatic carreon pull and replacement 11/8-11/9, blood clots improving on flushing,   -Pain reg (suprapubic) scheduled tyl 975mg TID, oxy 2.5 mg q8hr PRN for mod to severe pain  -agitated managed as  above  -blood cx 11/7 prelim NGTD  -improving Ca and Na, IVF 75 ml/hr converted to FWF to 400 ml q6hr  -switched zosyn (11/8-11/9) to CTX (11/7, 11/10-11/12), plan for total 5 day course for UTI  -LN biopsy 11/12, NPO @ MN, hold TF   -free water deficient 0.3 L  -dispo and appts as above    #Pancreatitis  Plan:  -tx at [facility], evidence found on CTAP 11/7  -ctm    #PEG  #Failure to thrive, improving  #Poor dentition, stable  #Normocytic Anemia, worsened  - Malnourished, prealbumin 9/2024 9.0, now s/p PEG   - C/f underlying malignancy  -poor po intake requiring PEG at [facility] 10/2024  -hgb 9.5, mcv 95  -medical care prior to 2024 was in 2022  -7.7 11/11 from 8.2  -prealbumin low 13.9 11/7  Plan:  -nutrition rec: When appropriate, Start Isosource 1.5@ 15 mL/hr.   Advance by 10 mL q 6 hrs to goal rate of 45 mL/hr x 24 hr  -type and screen ordered and consent on file  -transfuse if hgb 7 or less  -malignancy workup as above  -improving Ca and Na, IVF 75 ml/hr converted to FWF to 400 ml q6hr  -bid rfp/mg, monitor for refeeding and replete as needed      #Unspecified Recurrent Falls  -CTHead and CTcervical spine wnl no acute findings  - Falls likely 2/2 hypovolemia iso poor PO intake, potential sepsis or concern for malignancy   Plan:  -ctm  -on tele  -fall risk noted in orders, bed alarm needed and bracelet   -changed Seroquel 12.5 q8hr prn to at bedtime 11/11 for sleep on agitation and continue sitter     #Decubitus Ulcer  Plan:  -wound care orders updated for sacral wound 11/11    F FWF 400cc q6hr  E prn K>4, Mg>2 phos>3, monitor refeeding replete  N tube feeds, held at midnight for LN biopsy  A PIV, PEG, [place], B/L Neph tube   DVT ppx subcutaneous hep  Bowel reg miralax prn     Code Status: FULL CODE (pt with/o capacity, confirmed status with son Colten)   NOK: Colten Booth (son) [phone], Torsten Booth () [phone]    Mariana Robertson MD

## 2024-11-11 NOTE — PROGRESS NOTES
11/11/24 1114   Discharge Planning   Living Arrangements Spouse/significant other   Support Systems Spouse/significant other;Children;Family members   Type of Residence Private residence   Who is requesting discharge planning? Provider   Home or Post Acute Services In home services   Expected Discharge Disposition Home H   Financial Resource Strain   How hard is it for you to pay for the very basics like food, housing, medical care, and heating? Not hard   Housing Stability   In the last 12 months, was there a time when you were not able to pay the mortgage or rent on time? N   At any time in the past 12 months, were you homeless or living in a shelter (including now)? N   Transportation Needs   In the past 12 months, has lack of transportation kept you from medical appointments or from getting medications? no   In the past 12 months, has lack of transportation kept you from meetings, work, or from getting things needed for daily living? No     SW ASSESSMENT  MIKA attempted to call pt's spouse, Torsten Booth, but he did not answer. MIKA was unable to leave a vm.  MIKA called pt's son, Colten Booth, to discuss dc plans. Colten said he would like for the pt to dc home with home care. MIKA explained the med team recommendation for SNF, and stated home care will only provide care for pt a few times a week. Colten understood and still wanted pt to go home with home care.    MIKA asked Colten if he had spoken to his father, Torsten Booth, recently. Colten said he calls his dad everyday, but he is hard to get a hold of sometimes. Colten provided his dad's cell phone number as well.    Colten said his goal is for the pt (and his father) to live with him in [place] soon. Colten stated the pt has a lot more support in [place] and will benefit from moving there.    UPDATE 12:00  MIKA received call back from pt's , Torsten ([phone]), to discuss pt's dc plan. Torsten said he would like for pt to dc home with home care. MIKA reminded Torsten that home care can  only come by a few times a week, and a majority of pt's care will fall to him. Torsten understood. Torsten asked that SW and med team leaves a message when calling his home phone so he knows who calls.    UPDATE 16:00  MIKA informed by med team that Torsten and Colten would like for pt to go to [place] when she is dc. Per med team, Torsten and Colten may want pt to go to SNF in [place].   SW attempted to call Colten to discuss this. MIKA left .   Leon called SW back and stated he would like pt to have home health care in [place]. He does not want pt to go to SNF in [place] right away, but is okay with her going there if needed.  Colten said his wife knows a home health care company and he will text the info to MIKA. MIKA informed team of this.    MIKA notified team.  MIKA will follow as needed.    Dolores Bell MSW [facility]  Care Transitions  2  [phone] or Epic Secure Chat

## 2024-11-11 NOTE — PROGRESS NOTES
Transitional Care Coordination Progress Note:  Patient discussed during interdisciplinary rounds.  Team members present: MD, ANDREA  Plan per Medical/Surgical team: Pt presenting with fall and concern for UTI. Per medical team they are treating FADIA, plan for nephrostomy tube exchange with IR + biopsy, and PET scan for possible malignancy.  Payer: [employer]  Status: Inpatient  Discharge disposition: Skilled nursing facility pending preferences from pt's son.  Potential Barriers: none  ADOD: 11/12 vs 11/13  SW consulted to complete initial discharge planning assessment and discuss SNF placement with pt's son who lives in [place]. Care coordinator will continue to follow for discharge planning needs.     Tristian Michel RN  Transitional Care Coordinator (TCC)  [phone] or [phone]

## 2024-11-11 NOTE — CONSULTS
Wound Care Consult     Visit Date: 11/11/2024      Patient Name: Wanda Booth         MRN: [id number]           YOB: 1950     Reason for Consult: sacrum     Wound History: unknown; DTI     Wound Assessment:  Wound 11/08/24 Sacrum (Active)   Wound Image   11/11/24 1555   Wound Length (cm) 4 cm 11/11/24 1555   Wound Width (cm) 8 cm 11/11/24 1555   Wound Surface Area (cm^2) 32 cm^2 11/11/24 1555   Drainage Description None 11/11/24 1555   Drainage Amount None 11/11/24 0300   Dressing Barrier film;Silicone border dressing 11/11/24 1555   Dressing Changed New 11/11/24 1555   Dressing Status Clean;Dry 11/11/24 1555     Assessment/Intervention: Pt seen resting L-side lying in bed with sitter present. Cleaned of stool to reveal ~4cm x 8cm deep tissue pressure injury spanning across lower sacrum/coccyx. No drainage, just shallow-appearing dusky skin damage likely r/t pressure. Site cleaned, prepped and dressed with Mepilex foam. Chux changes, and pt left far R-side lying to offload. Recs below -->     Wound Team Recs: Keep pt's skin clean and dry. Aggressive turning q2 hours side-to-side to offload sacrum. Mepilex foam to sacral breakdown. Change q3 days and PRN if soiled. Remove GENTLY.     Provider, please review and sign saved wound care orders accordingly.      Africa Baker RN, CWON  11/11/2024  3:56 PM

## 2024-11-11 NOTE — PROGRESS NOTES
Occupational Therapy                 Therapy Communication Note    Patient Name: Wanda Booth  MRN: [id number]  Department: [facility]  Room: 5066/5066-B  Today's Date: 11/11/2024     Discipline: Occupational Therapy    Missed Visit Reason: Missed Visit Reason:  (off floor)    Missed Time: Attempt    Comment:

## 2024-11-11 NOTE — CARE PLAN
The patient's goals for the shift include AME    The clinical goals for the shift include pt will remain safe during shift

## 2024-11-11 NOTE — RESEARCH NOTES
Artificial Intelligence Monitoring in Nursing (AIMS Nursing) Study    Principle Investigator - Dr. Sharath Talamantes  Research Coordinator - Nevin Junior     Patient Name - Wanda Booth  Date - 11/11/2024 11:54 AM  Location - [place] [id number]    Wanda Booth was approached by Nevin Junior to talk about participating in the AIMS Nursing Study. The patient was not able to be approached, a research coordinator will come back at a later time. Study protocol was followed and patient was given study contact information.     Nevin Junior

## 2024-11-11 NOTE — PROGRESS NOTES
Physical Therapy    Physical Therapy Evaluation    Patient Name: Wanda Booth  MRN: [id number]  Department: [place] [address]  Room: 5066/5066-B  Today's Date: 11/11/2024   Time Calculation  Start Time: 0818  Stop Time: 0833  Time Calculation (min): 15 min    Assessment/Plan   PT Assessment  PT Assessment Results: Decreased strength, Decreased range of motion, Decreased endurance, Impaired balance, Decreased mobility  Rehab Prognosis: Good  Barriers to Discharge: none  Evaluation/Treatment Tolerance: Patient limited by fatigue  End of Session Communication: Bedside nurse  Assessment Comment: 74 y.o. F admitted for treatment s/p fall and FADIA. Currently demonstrating impaired strength, balance, and function. Pt will benefit from continued PT in house and after discharge at MOD intensity to restore functional capacity.  End of Session Patient Position: Bed, 4 rail up, Alarm off, caregiver present  IP OR SWING BED PT PLAN  Inpatient or Swing Bed: Inpatient  PT Plan  Treatment/Interventions: Bed mobility, Transfer training, Gait training, Balance training, Strengthening, Therapeutic exercise, Therapeutic activity  PT Plan: Ongoing PT  PT Frequency: 3 times per week  PT Discharge Recommendations: Moderate intensity level of continued care  PT Recommended Transfer Status: Assist x1  PT - OK to Discharge: Yes    Subjective   General Visit Information:  General  Reason for Referral: fall  Past Medical History Relevant to Rehab: 74 y.o. 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  Family/Caregiver Present: Yes  Caregiver Feedback: sitter present. RN preparing morning medications upon PT's exit  Prior to Session Communication: Bedside nurse  Patient Position Received: Bed, 4 rail up,  "Alarm off, caregiver present  Preferred Learning Style: verbal, auditory  General Comment: Sleeping upon entry. Easily awakened. Pleasantly confused though willing to work with PT.  Home Living:  Home Living  Type of Home: House  Lives With:  (Pt admitted from facility though reports she formerly was living at home with  at baseline.)  Home Adaptive Equipment: None  Home Layout:  (Pt unable to provide specifics of home set up.)  Prior Level of Function:  Prior Function Per Pt/Caregiver Report  Level of Oliver: Needs assistance with ADLs, Needs assistance with homemaking, Needs assistance with functional transfers  Ambulatory Assistance: Independent (pt reports at baseline she was ambulatory without use of a device.)  Prior Function Comments: Pt notes she \"tripped\" when she fell at facility.  Precautions:  Precautions  Medical Precautions: Fall precautions     Objective   Pain:  Pain Assessment  Pain Assessment: 0-10  0-10 (Numeric) Pain Score: 0 - No pain  Cognition:  Cognition  Overall Cognitive Status: Impaired  Arousal/Alertness: Delayed responses to stimuli  Orientation Level: Disoriented to time  Following Commands: Follows one step commands with increased time  Insight: Mild    General Assessments:    Activity Tolerance  Endurance: Tolerates 10 - 20 min exercise with multiple rests    Sensation  Light Touch: No apparent deficits    Strength  Strength Comments: BLE grossly >3+,4-/5 throughout BLE based on function  Strength  Strength Comments: BLE grossly >3+,4-/5 throughout BLE based on function    Coordination  Movements are Fluid and Coordinated: No  Coordination Comment: delayed responses    Postural Control  Postural Control: Impaired  Posture Comment: flexed/kyphotic posture    Static Sitting Balance  Static Sitting-Balance Support: Bilateral upper extremity supported  Static Sitting-Level of Assistance: Minimum assistance  Static Sitting-Comment/Number of Minutes: EOB for approximately 7-8 " minutes total with continuous min assist for support due to decreased trunk strength. Pt at falls risk without continuous assist.    Static Standing Balance  Static Standing-Comment/Number of Minutes: Pt unable to tolerate attempts on this date though anticipate would require max assist for static stance.  Functional Assessments:     Bed Mobility  Bed Mobility: Yes  Bed Mobility 1  Bed Mobility 1: Supine to sitting, Sitting to supine  Level of Assistance 1: Maximum assistance, Minimal verbal cues, Minimal tactile cues    Transfers  Transfer: No (anticipate would require heavy assist/max assist)    Ambulation/Gait Training  Ambulation/Gait Training Performed: No    Outcome Measures:  [facility] Basic Mobility  Turning from your back to your side while in a flat bed without using bedrails: A lot  Moving from lying on your back to sitting on the side of a flat bed without using bedrails: A lot  Moving to and from bed to chair (including a wheelchair): Total  Standing up from a chair using your arms (e.g. wheelchair or bedside chair): Total  To walk in hospital room: Total  Climbing 3-5 steps with railing: Total  Basic Mobility - Total Score: 8    Encounter Problems       Encounter Problems (Active)       Mobility       STG - Patient will ambulate 15ft, wheeled walker, min assist       Start:  11/11/24    Expected End:  11/25/24               PT Transfers       STG - Patient to transfer to and from sit to supine min assist       Start:  11/11/24    Expected End:  11/25/24            STG - Patient will transfer sit to and from stand min assist       Start:  11/11/24    Expected End:  11/25/24                   Education Documentation  Precautions, taught by Pascual Clinton PT at 11/11/2024  9:17 AM.  Learner: Patient  Readiness: Acceptance  Method: Explanation  Response: Verbalizes Understanding    Mobility Training, taught by Pascual Clinton PT at 11/11/2024  9:17 AM.  Learner: Patient  Readiness: Acceptance  Method:  Explanation  Response: Verbalizes Understanding    Education Comments  No comments found.

## 2024-11-12 ENCOUNTER — APPOINTMENT (OUTPATIENT)
Dept: RADIOLOGY | Facility: HOSPITAL | Age: 74
End: 2024-11-12
Payer: COMMERCIAL

## 2024-11-12 LAB
ALBUMIN SERPL BCP-MCNC: 1.9 G/DL (ref 3.4–5)
ALBUMIN SERPL BCP-MCNC: 2.1 G/DL (ref 3.4–5)
ANION GAP SERPL CALC-SCNC: 11 MMOL/L (ref 10–20)
ANION GAP SERPL CALC-SCNC: <7 MMOL/L (ref 10–20)
BASOPHILS # BLD AUTO: 0.09 X10*3/UL (ref 0–0.1)
BASOPHILS NFR BLD AUTO: 0.4 %
BUN SERPL-MCNC: 19 MG/DL (ref 6–23)
BUN SERPL-MCNC: 22 MG/DL (ref 6–23)
CALCIUM SERPL-MCNC: 10.6 MG/DL (ref 8.6–10.6)
CALCIUM SERPL-MCNC: 11.2 MG/DL (ref 8.6–10.6)
CHLORIDE SERPL-SCNC: 112 MMOL/L (ref 98–107)
CHLORIDE SERPL-SCNC: 112 MMOL/L (ref 98–107)
CO2 SERPL-SCNC: 22 MMOL/L (ref 21–32)
CO2 SERPL-SCNC: 23 MMOL/L (ref 21–32)
CREAT SERPL-MCNC: 1.11 MG/DL (ref 0.5–1.05)
CREAT SERPL-MCNC: 1.27 MG/DL (ref 0.5–1.05)
EGFRCR SERPLBLD CKD-EPI 2021: 44 ML/MIN/1.73M*2
EGFRCR SERPLBLD CKD-EPI 2021: 52 ML/MIN/1.73M*2
EOSINOPHIL # BLD AUTO: 1.71 X10*3/UL (ref 0–0.4)
EOSINOPHIL NFR BLD AUTO: 8.2 %
ERYTHROCYTE [DISTWIDTH] IN BLOOD BY AUTOMATED COUNT: 17.8 % (ref 11.5–14.5)
GLUCOSE BLD MANUAL STRIP-MCNC: 86 MG/DL (ref 74–99)
GLUCOSE BLD MANUAL STRIP-MCNC: 89 MG/DL (ref 74–99)
GLUCOSE BLD MANUAL STRIP-MCNC: 94 MG/DL (ref 74–99)
GLUCOSE SERPL-MCNC: 88 MG/DL (ref 74–99)
GLUCOSE SERPL-MCNC: 98 MG/DL (ref 74–99)
HCT VFR BLD AUTO: 26.1 % (ref 36–46)
HGB BLD-MCNC: 8.2 G/DL (ref 12–16)
IMM GRANULOCYTES # BLD AUTO: 0.13 X10*3/UL (ref 0–0.5)
IMM GRANULOCYTES NFR BLD AUTO: 0.6 % (ref 0–0.9)
LYMPHOCYTES # BLD AUTO: 3.91 X10*3/UL (ref 0.8–3)
LYMPHOCYTES NFR BLD AUTO: 18.7 %
MAGNESIUM SERPL-MCNC: 2.25 MG/DL (ref 1.6–2.4)
MAGNESIUM SERPL-MCNC: 2.65 MG/DL (ref 1.6–2.4)
MCH RBC QN AUTO: 31.5 PG (ref 26–34)
MCHC RBC AUTO-ENTMCNC: 31.4 G/DL (ref 32–36)
MCV RBC AUTO: 100 FL (ref 80–100)
MONOCYTES # BLD AUTO: 1.13 X10*3/UL (ref 0.05–0.8)
MONOCYTES NFR BLD AUTO: 5.4 %
NEUTROPHILS # BLD AUTO: 13.92 X10*3/UL (ref 1.6–5.5)
NEUTROPHILS NFR BLD AUTO: 66.7 %
NRBC BLD-RTO: 0 /100 WBCS (ref 0–0)
PHOSPHATE SERPL-MCNC: 3.1 MG/DL (ref 2.5–4.9)
PHOSPHATE SERPL-MCNC: 3.4 MG/DL (ref 2.5–4.9)
PLATELET # BLD AUTO: 401 X10*3/UL (ref 150–450)
POTASSIUM SERPL-SCNC: 3.4 MMOL/L (ref 3.5–5.3)
POTASSIUM SERPL-SCNC: 3.9 MMOL/L (ref 3.5–5.3)
PTH RELATED PROT SERPL-SCNC: 21 PMOL/L
RBC # BLD AUTO: 2.6 X10*6/UL (ref 4–5.2)
SODIUM SERPL-SCNC: 136 MMOL/L (ref 136–145)
SODIUM SERPL-SCNC: 141 MMOL/L (ref 136–145)
WBC # BLD AUTO: 20.9 X10*3/UL (ref 4.4–11.3)

## 2024-11-12 PROCEDURE — 83735 ASSAY OF MAGNESIUM: CPT | Performed by: NUTRITIONIST

## 2024-11-12 PROCEDURE — 2500000002 HC RX 250 W HCPCS SELF ADMINISTERED DRUGS (ALT 637 FOR MEDICARE OP, ALT 636 FOR OP/ED): Performed by: NUTRITIONIST

## 2024-11-12 PROCEDURE — 2500000004 HC RX 250 GENERAL PHARMACY W/ HCPCS (ALT 636 FOR OP/ED)

## 2024-11-12 PROCEDURE — 36415 COLL VENOUS BLD VENIPUNCTURE: CPT | Performed by: NUTRITIONIST

## 2024-11-12 PROCEDURE — 80069 RENAL FUNCTION PANEL: CPT | Performed by: NUTRITIONIST

## 2024-11-12 PROCEDURE — 2500000004 HC RX 250 GENERAL PHARMACY W/ HCPCS (ALT 636 FOR OP/ED): Performed by: NUTRITIONIST

## 2024-11-12 PROCEDURE — 1200000002 HC GENERAL ROOM WITH TELEMETRY DAILY

## 2024-11-12 PROCEDURE — 85025 COMPLETE CBC W/AUTO DIFF WBC: CPT | Performed by: NUTRITIONIST

## 2024-11-12 PROCEDURE — 2500000001 HC RX 250 WO HCPCS SELF ADMINISTERED DRUGS (ALT 637 FOR MEDICARE OP)

## 2024-11-12 PROCEDURE — 99233 SBSQ HOSP IP/OBS HIGH 50: CPT | Performed by: NUTRITIONIST

## 2024-11-12 PROCEDURE — 2500000001 HC RX 250 WO HCPCS SELF ADMINISTERED DRUGS (ALT 637 FOR MEDICARE OP): Performed by: INTERNAL MEDICINE

## 2024-11-12 PROCEDURE — 82947 ASSAY GLUCOSE BLOOD QUANT: CPT

## 2024-11-12 RX ORDER — POTASSIUM CHLORIDE 1.5 G/1.58G
40 POWDER, FOR SOLUTION ORAL ONCE
Status: COMPLETED | OUTPATIENT
Start: 2024-11-12 | End: 2024-11-12

## 2024-11-12 ASSESSMENT — COGNITIVE AND FUNCTIONAL STATUS - GENERAL
EATING MEALS: TOTAL
TURNING FROM BACK TO SIDE WHILE IN FLAT BAD: A LOT
MOVING FROM LYING ON BACK TO SITTING ON SIDE OF FLAT BED WITH BEDRAILS: A LOT
WALKING IN HOSPITAL ROOM: TOTAL
DRESSING REGULAR UPPER BODY CLOTHING: A LOT
TOILETING: TOTAL
DRESSING REGULAR LOWER BODY CLOTHING: A LOT
MOVING TO AND FROM BED TO CHAIR: A LOT
DAILY ACTIVITIY SCORE: 10
PERSONAL GROOMING: A LOT
STANDING UP FROM CHAIR USING ARMS: TOTAL
MOBILITY SCORE: 9
HELP NEEDED FOR BATHING: A LOT
CLIMB 3 TO 5 STEPS WITH RAILING: TOTAL

## 2024-11-12 ASSESSMENT — PAIN SCALES - GENERAL
PAINLEVEL_OUTOF10: 0 - NO PAIN

## 2024-11-12 ASSESSMENT — PAIN - FUNCTIONAL ASSESSMENT
PAIN_FUNCTIONAL_ASSESSMENT: 0-10

## 2024-11-12 NOTE — RESEARCH NOTES
Artificial Intelligence Monitoring in Nursing (AIMS Nursing) Study    Principle Investigator - Dr. Sharath Talamantes  Research Coordinator - Nevin Junior     Patient Name - Wanda Booth  Date - 11/12/2024 11:34 AM  Location - [place] [address]    Wanda Booth was approached by Nevin Junior to talk about participating in the AIMS Nursing Study. The patient was not able to be approached, a research coordinator will come back at a later time. Study protocol was followed and patient was given study contact information.     Nevin Junior

## 2024-11-12 NOTE — CARE PLAN
The patient's goals for the shift include AME    The clinical goals for the shift include Pt will remain safe and stbale during shift

## 2024-11-12 NOTE — PRE-PROCEDURE NOTE
INTERVENTIONAL RADIOLOGY PRE-PROCEDURE NOTE    Wanda Booth is a 74 y.o. female with hypercalcemia and imaging findings concerning for a metastatic malignancy, suspect bladder. She presents to the interventional radiology department for pelvic node biopsy (laterality to be determined in imaging suite).    Upon reviewing the imaging, the target is in a sub-optimal location. Risks outweigh benefits of proceeding at this time. Family and ordering team notified.     Indication for procedure: The primary encounter diagnosis was FADIA (acute kidney injury) (CMS-HCC). Diagnoses of Urinary tract infection associated with indwelling urethral catheter, initial encounter (CMS-[facility]) and Hypercalcemia were also pertinent to this visit.    No past medical history on file.   Past Surgical History:   Procedure Laterality Date    CHOLECYSTECTOMY  06/14/2016    Cholecystectomy Laparoscopic    OOPHORECTOMY  06/14/2016    Oophorectomy - Unilateral (Removal Of One Ovary)       Relevant Labs:   Lab Results   Component Value Date    CREATININE 1.27 (H) 11/12/2024    EGFR 44 (L) 11/12/2024    INR 1.1 11/08/2024    PROTIME 12.6 11/08/2024       Planned Sedation/Anesthesia: Moderate    Directed physical examination:    General: Normal appearance, behavior, cognition and NAD  Heart: Heart regular rate and rhythm  Lungs: No increased work of breathing      Current Facility-Administered Medications:     acetaminophen (Tylenol) oral liquid 1,000 mg, 1,000 mg, oral, q8h, Ernesto Clark MD, 1,000 mg at 11/12/24 0447    bisacodyl (Dulcolax) suppository 10 mg, 10 mg, rectal, Daily PRN, Bertrand Holbrook MD    cefTRIAXone (Rocephin) 1 g in dextrose (iso) IV 50 mL, 1 g, intravenous, q24h, Mariana Robertson MD, Last Rate: 100 mL/hr at 11/12/24 1020, 1 g at 11/12/24 1020    heparin (porcine) injection 5,000 Units, 5,000 Units, subcutaneous, q8h, Mariana Robertson MD, 5,000 Units at 11/12/24 0901    lactated Ringer's bolus 1,000 mL, 1,000 mL, intravenous,  Once, Mariana Robertson MD    melatonin tablet 5 mg, 5 mg, oral, Nightly, Estela Lou MD MPH, 5 mg at 11/11/24 2030    oxyCODONE (Roxicodone) immediate release tablet 2.5 mg, 2.5 mg, oral, q6h PRN, Mariana Robertson MD    pamidronate (Aredia) 60 mg in dextrose 5% 1,000 mL IV, 60 mg, intravenous, Once, Mariana Robertson MD    polyethylene glycol (Glycolax, Miralax) packet 17 g, 17 g, oral, Daily PRN, Bertrand Holbrook MD    QUEtiapine (SEROquel) tablet 12.5 mg, 12.5 mg, oral, Nightly, Mariana Robertson MD, 12.5 mg at 11/11/24 2030     Mallampati: II (hard and soft palate, upper portion of tonsils anduvula visible)    ASA Score: ASA 2 - Patient with mild systemic disease with no functional limitations    Benefits, risks and alternatives of procedure and planned sedation have been discussed with the patient and/or their representative. All questions answered and they agree to proceed.     Sesar Yu MD, PGY-4  Vascular & Interventional Radiology  IR pager: [phone]    NON-Urgent on call weekends and after hours weekdays (5pm - 5am) IR pager: [phone]  Urgent & emergent on call weekends and after hours weekdays (5pm-7am) IR pager: [phone]

## 2024-11-12 NOTE — PROGRESS NOTES
Wanda Booth is a 74 y.o. female on day 5 of admission presenting with FADIA (acute kidney injury) (CMS-HCC).    MIKA consulted on 11/11/24 to assist with dc planning.    MIKA was messaged by pt's son, Colten, regarding home care for pt in [place]. MIKA informed med team of this.   Resident stated she would talk to Colten about SNF recommendation for pt and decide on a plan.    MIKA will follow for next steps.    Pt adod: 11/14-11/15  [facility]  Care Transitions  2  [phone] or Epic Secure Chat

## 2024-11-12 NOTE — PROGRESS NOTES
Occupational Therapy                 Therapy Communication Note    Patient Name: Wanda Booth  MRN: [id number]  Department: [facility]  Room: 5066/5066-B  Today's Date: 11/12/2024     Discipline: Occupational Therapy    Missed Visit Reason: Missed Visit Reason:  (CT)    Missed Time: Attempt    Comment:

## 2024-11-13 LAB
ABO GROUP (TYPE) IN BLOOD: NORMAL
ALBUMIN SERPL BCP-MCNC: 2 G/DL (ref 3.4–5)
ALBUMIN SERPL BCP-MCNC: 2.1 G/DL (ref 3.4–5)
ANION GAP SERPL CALC-SCNC: 8 MMOL/L (ref 10–20)
ANION GAP SERPL CALC-SCNC: 9 MMOL/L (ref 10–20)
ANTIBODY SCREEN: NORMAL
ATRIAL RATE: 77 BPM
BASOPHILS # BLD AUTO: 0.09 X10*3/UL (ref 0–0.1)
BASOPHILS NFR BLD AUTO: 0.4 %
BUN SERPL-MCNC: 15 MG/DL (ref 6–23)
BUN SERPL-MCNC: 16 MG/DL (ref 6–23)
CALCIUM SERPL-MCNC: 10.8 MG/DL (ref 8.6–10.6)
CALCIUM SERPL-MCNC: 11.3 MG/DL (ref 8.6–10.6)
CHLORIDE SERPL-SCNC: 110 MMOL/L (ref 98–107)
CHLORIDE SERPL-SCNC: 112 MMOL/L (ref 98–107)
CO2 SERPL-SCNC: 21 MMOL/L (ref 21–32)
CO2 SERPL-SCNC: 23 MMOL/L (ref 21–32)
CREAT SERPL-MCNC: 1.08 MG/DL (ref 0.5–1.05)
CREAT SERPL-MCNC: 1.13 MG/DL (ref 0.5–1.05)
EGFRCR SERPLBLD CKD-EPI 2021: 51 ML/MIN/1.73M*2
EGFRCR SERPLBLD CKD-EPI 2021: 54 ML/MIN/1.73M*2
EOSINOPHIL # BLD AUTO: 1.1 X10*3/UL (ref 0–0.4)
EOSINOPHIL NFR BLD AUTO: 5.3 %
ERYTHROCYTE [DISTWIDTH] IN BLOOD BY AUTOMATED COUNT: 17.6 % (ref 11.5–14.5)
GLUCOSE BLD MANUAL STRIP-MCNC: 106 MG/DL (ref 74–99)
GLUCOSE BLD MANUAL STRIP-MCNC: 149 MG/DL (ref 74–99)
GLUCOSE SERPL-MCNC: 131 MG/DL (ref 74–99)
GLUCOSE SERPL-MCNC: 131 MG/DL (ref 74–99)
HCT VFR BLD AUTO: 24.3 % (ref 36–46)
HGB BLD-MCNC: 7.7 G/DL (ref 12–16)
IMM GRANULOCYTES # BLD AUTO: 0.12 X10*3/UL (ref 0–0.5)
IMM GRANULOCYTES NFR BLD AUTO: 0.6 % (ref 0–0.9)
LYMPHOCYTES # BLD AUTO: 4.08 X10*3/UL (ref 0.8–3)
LYMPHOCYTES NFR BLD AUTO: 19.5 %
MAGNESIUM SERPL-MCNC: 2.02 MG/DL (ref 1.6–2.4)
MAGNESIUM SERPL-MCNC: 2.1 MG/DL (ref 1.6–2.4)
MCH RBC QN AUTO: 31.3 PG (ref 26–34)
MCHC RBC AUTO-ENTMCNC: 31.7 G/DL (ref 32–36)
MCV RBC AUTO: 99 FL (ref 80–100)
MONOCYTES # BLD AUTO: 1.5 X10*3/UL (ref 0.05–0.8)
MONOCYTES NFR BLD AUTO: 7.2 %
NEUTROPHILS # BLD AUTO: 13.99 X10*3/UL (ref 1.6–5.5)
NEUTROPHILS NFR BLD AUTO: 67 %
NRBC BLD-RTO: 0 /100 WBCS (ref 0–0)
P AXIS: 56 DEGREES
P OFFSET: 186 MS
P ONSET: 138 MS
PHOSPHATE SERPL-MCNC: 2.3 MG/DL (ref 2.5–4.9)
PHOSPHATE SERPL-MCNC: 2.9 MG/DL (ref 2.5–4.9)
PLATELET # BLD AUTO: 477 X10*3/UL (ref 150–450)
POTASSIUM SERPL-SCNC: 3.7 MMOL/L (ref 3.5–5.3)
POTASSIUM SERPL-SCNC: 3.9 MMOL/L (ref 3.5–5.3)
PR INTERVAL: 158 MS
Q ONSET: 217 MS
QRS COUNT: 13 BEATS
QRS DURATION: 88 MS
QT INTERVAL: 358 MS
QTC CALCULATION(BAZETT): 405 MS
QTC FREDERICIA: 389 MS
R AXIS: 4 DEGREES
RBC # BLD AUTO: 2.46 X10*6/UL (ref 4–5.2)
RH FACTOR (ANTIGEN D): NORMAL
SODIUM SERPL-SCNC: 137 MMOL/L (ref 136–145)
SODIUM SERPL-SCNC: 138 MMOL/L (ref 136–145)
T AXIS: 68 DEGREES
T OFFSET: 396 MS
VENTRICULAR RATE: 77 BPM
WBC # BLD AUTO: 20.9 X10*3/UL (ref 4.4–11.3)

## 2024-11-13 PROCEDURE — 36415 COLL VENOUS BLD VENIPUNCTURE: CPT | Performed by: NUTRITIONIST

## 2024-11-13 PROCEDURE — 84100 ASSAY OF PHOSPHORUS: CPT | Performed by: NUTRITIONIST

## 2024-11-13 PROCEDURE — 2500000002 HC RX 250 W HCPCS SELF ADMINISTERED DRUGS (ALT 637 FOR MEDICARE OP, ALT 636 FOR OP/ED): Performed by: NUTRITIONIST

## 2024-11-13 PROCEDURE — 86901 BLOOD TYPING SEROLOGIC RH(D): CPT

## 2024-11-13 PROCEDURE — 97535 SELF CARE MNGMENT TRAINING: CPT | Mod: GO

## 2024-11-13 PROCEDURE — 82947 ASSAY GLUCOSE BLOOD QUANT: CPT

## 2024-11-13 PROCEDURE — 2500000001 HC RX 250 WO HCPCS SELF ADMINISTERED DRUGS (ALT 637 FOR MEDICARE OP): Performed by: NUTRITIONIST

## 2024-11-13 PROCEDURE — 83735 ASSAY OF MAGNESIUM: CPT | Performed by: NUTRITIONIST

## 2024-11-13 PROCEDURE — 2500000004 HC RX 250 GENERAL PHARMACY W/ HCPCS (ALT 636 FOR OP/ED): Performed by: NUTRITIONIST

## 2024-11-13 PROCEDURE — 1200000002 HC GENERAL ROOM WITH TELEMETRY DAILY

## 2024-11-13 PROCEDURE — 80069 RENAL FUNCTION PANEL: CPT | Performed by: NUTRITIONIST

## 2024-11-13 PROCEDURE — 2500000001 HC RX 250 WO HCPCS SELF ADMINISTERED DRUGS (ALT 637 FOR MEDICARE OP)

## 2024-11-13 PROCEDURE — 2500000001 HC RX 250 WO HCPCS SELF ADMINISTERED DRUGS (ALT 637 FOR MEDICARE OP): Performed by: INTERNAL MEDICINE

## 2024-11-13 PROCEDURE — 85025 COMPLETE CBC W/AUTO DIFF WBC: CPT | Performed by: NUTRITIONIST

## 2024-11-13 PROCEDURE — 99233 SBSQ HOSP IP/OBS HIGH 50: CPT | Performed by: INTERNAL MEDICINE

## 2024-11-13 PROCEDURE — 97110 THERAPEUTIC EXERCISES: CPT | Mod: GP,CQ

## 2024-11-13 PROCEDURE — 36415 COLL VENOUS BLD VENIPUNCTURE: CPT

## 2024-11-13 RX ORDER — TRAMADOL HYDROCHLORIDE 50 MG/1
25 TABLET ORAL EVERY 8 HOURS
Status: DISCONTINUED | OUTPATIENT
Start: 2024-11-13 | End: 2024-11-16 | Stop reason: HOSPADM

## 2024-11-13 RX ORDER — ASPIRIN 325 MG
50000 TABLET, DELAYED RELEASE (ENTERIC COATED) ORAL WEEKLY
Status: DISCONTINUED | OUTPATIENT
Start: 2024-11-13 | End: 2024-11-16 | Stop reason: HOSPADM

## 2024-11-13 RX ORDER — TRAMADOL HYDROCHLORIDE 50 MG/1
25 TABLET ORAL ONCE
Status: COMPLETED | OUTPATIENT
Start: 2024-11-13 | End: 2024-11-13

## 2024-11-13 RX ORDER — PANTOPRAZOLE SODIUM 40 MG/10ML
40 INJECTION, POWDER, LYOPHILIZED, FOR SOLUTION INTRAVENOUS DAILY
Status: DISCONTINUED | OUTPATIENT
Start: 2024-11-13 | End: 2024-11-16 | Stop reason: HOSPADM

## 2024-11-13 ASSESSMENT — COGNITIVE AND FUNCTIONAL STATUS - GENERAL
HELP NEEDED FOR BATHING: A LOT
WALKING IN HOSPITAL ROOM: TOTAL
DRESSING REGULAR LOWER BODY CLOTHING: TOTAL
DRESSING REGULAR UPPER BODY CLOTHING: A LOT
STANDING UP FROM CHAIR USING ARMS: TOTAL
CLIMB 3 TO 5 STEPS WITH RAILING: TOTAL
EATING MEALS: TOTAL
MOVING FROM LYING ON BACK TO SITTING ON SIDE OF FLAT BED WITH BEDRAILS: A LOT
MOVING TO AND FROM BED TO CHAIR: A LOT
HELP NEEDED FOR BATHING: A LOT
PERSONAL GROOMING: A LITTLE
DRESSING REGULAR UPPER BODY CLOTHING: A LOT
MOBILITY SCORE: 8
TURNING FROM BACK TO SIDE WHILE IN FLAT BAD: A LOT
DRESSING REGULAR LOWER BODY CLOTHING: A LOT
MOVING FROM LYING ON BACK TO SITTING ON SIDE OF FLAT BED WITH BEDRAILS: A LOT
TOILETING: TOTAL
TURNING FROM BACK TO SIDE WHILE IN FLAT BAD: A LOT
MOBILITY SCORE: 9
PERSONAL GROOMING: A LOT
CLIMB 3 TO 5 STEPS WITH RAILING: TOTAL
STANDING UP FROM CHAIR USING ARMS: TOTAL
EATING MEALS: TOTAL
TOILETING: TOTAL
DAILY ACTIVITIY SCORE: 10
DAILY ACTIVITIY SCORE: 10
MOVING TO AND FROM BED TO CHAIR: TOTAL
WALKING IN HOSPITAL ROOM: TOTAL

## 2024-11-13 ASSESSMENT — ACTIVITIES OF DAILY LIVING (ADL): HOME_MANAGEMENT_TIME_ENTRY: 19

## 2024-11-13 ASSESSMENT — PAIN SCALES - PAIN ASSESSMENT IN ADVANCED DEMENTIA (PAINAD)
CONSOLABILITY: NO NEED TO CONSOLE
TOTALSCORE: 0
BODYLANGUAGE: RELAXED
BREATHING: NORMAL
FACIALEXPRESSION: SMILING OR INEXPRESSIVE

## 2024-11-13 ASSESSMENT — PAIN - FUNCTIONAL ASSESSMENT
PAIN_FUNCTIONAL_ASSESSMENT: 0-10

## 2024-11-13 ASSESSMENT — PAIN SCALES - GENERAL
PAINLEVEL_OUTOF10: 0 - NO PAIN

## 2024-11-13 NOTE — PROGRESS NOTES
Transitional Care Coordination Progress Note:  Patient discussed during interdisciplinary rounds.  Team members present: ANDREA MARTINEZ  Plan per Medical/Surgical team: Per medical team IR team attempted LN biopsy yesterday, plan to consult with Urology team for cystoscopy.   Payer: [facility], [employer]  Status: Inpatient  Discharge disposition: [facility]  Potential Barriers: none  ADOD: 11/15  SW is following for SNF placement. Precert will be required prior to discharge. Care coordinator will continue to follow for discharge planning needs.     Tristian Michel RN  Transitional Care Coordinator (TCC)  [phone] or [phone]

## 2024-11-13 NOTE — PROGRESS NOTES
Occupational Therapy    Occupational Therapy Treatment    Name: Wanda Booth  MRN: [id number]  Department: [facility] 50  Room: 5066/5066-B  Date: 11/13/24  Time Calculation  Start Time: 1220  Stop Time: 1239  Time Calculation (min): 19 min    Assessment:  OT Assessment: difficulty I/ADLs, safety, fxnl mob  Prognosis: Fair  Barriers to Discharge: None  Evaluation/Treatment Tolerance: Patient limited by fatigue  Medical Staff Made Aware: Yes  End of Session Communication: Bedside nurse  End of Session Patient Position: Bed, 4 rail up, Alarm on  Plan:  Treatment Interventions: ADL retraining, Functional transfer training, UE strengthening/ROM, Endurance training, Cognitive reorientation, Patient/family training, Equipment evaluation/education, Compensatory technique education  OT Frequency: 2 times per week  OT Discharge Recommendations: Moderate intensity level of continued care  Equipment Recommended upon Discharge:  (hopstial bed, BSC, wheelchair)  OT Recommended Transfer Status: Assist of 2  OT - OK to Discharge: Yes    Subjective   Previous Visit Info:  OT Last Visit  OT Received On: 11/13/24  General:  General  Reason for Referral: fall  Past Medical History Relevant to Rehab: 74 y.o. 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  Family/Caregiver Present: No  Prior to Session Communication: Bedside nurse  Patient Position Received: Bed, 4 rail up, Alarm on  General Comment: improved alertness and cognition this date, declined OOB 2/2 fatigue agreeable ADLS  Precautions:  Medical Precautions: Fall precautions  Precautions Comment: nephrostomy tube    Vital Signs (Past 2hrs)        Date/Time Vitals Session Patient Position Pulse Resp SpO2 BP MAP (mmHg)    11/13/24 1125  "--  --  76  17  98 %  143/83  --                        Pain Assessment:  Pain Assessment  Pain Assessment: 0-10  0-10 (Numeric) Pain Score: 0 - No pain     Objective   Cognition:  Overall Cognitive Status: Impaired  Arousal/Alertness: Appropriate responses to stimuli  Orientation Level: Disoriented to time, Disoriented to place  Following Commands: Follows one step commands with increased time  Safety Judgment: Decreased awareness of need for assistance  Problem Solving: Assistance required to identify errors made  Memory: Exceptions to WFL  Long-Term Memory: Impaired  Short-Term Memory: Impaired  Working Memory: Impaired  Problem Solving: Exceptions to WFL  Complex Functional Tasks: Impaired  Simple Functional Tasks: Impaired  Insight:  (reports \"I don't want to fall, I'm doing so good!\")  Impulsive: Mildly  Activities of Daily Living:      Grooming  Grooming Level of Assistance:  (pt performed face washing wipes supine SBA setup cues to initiate, pt performed hair combing SBA setup extended time 2/2 large knot in back)          Bed Mobility/Transfers:  declined       Outcome Measures:  [facility] Daily Activity  Putting on and taking off regular lower body clothing: Total  Bathing (including washing, rinsing, drying): A lot  Putting on and taking off regular upper body clothing: A lot  Toileting, which includes using toilet, bedpan or urinal: Total  Taking care of personal grooming such as brushing teeth: A little  Eating Meals: Total  Daily Activity - Total Score: 10        Education Documentation  Body Mechanics, taught by Rosana Harmon OT at 11/13/2024 12:43 PM.  Learner: Patient  Readiness: Acceptance  Method: Explanation, Demonstration  Response: Verbalizes Understanding, Needs Reinforcement  Comment: ADLs    Precautions, taught by Rosana Harmon OT at 11/13/2024 12:43 PM.  Learner: Patient  Readiness: Acceptance  Method: Explanation, Demonstration  Response: Verbalizes Understanding, Needs " Reinforcement  Comment: ADLs    ADL Training, taught by Rosana Harmon OT at 11/13/2024 12:43 PM.  Learner: Patient  Readiness: Acceptance  Method: Explanation, Demonstration  Response: Verbalizes Understanding, Needs Reinforcement  Comment: ADLs    Education Comments  No comments found.      Goals:  Encounter Problems       Encounter Problems (Active)       ADLs       Patient will perform UB and LB bathing  with minimal assist  level of assistance and ae. (Progressing)       Start:  11/08/24    Expected End:  11/29/24            Patient with complete upper body dressing with minimal assist  level of assistance  (Progressing)       Start:  11/08/24    Expected End:  11/29/24            Patient with complete lower body dressing with moderate assist level of assistance donning and doffing all LE clothes  with PRN adaptive equipment  (Progressing)       Start:  11/08/24    Expected End:  11/29/24            Patient will feed self with minimal assist  level of assistance and verbal cues using PRN adaptive equipment. (Progressing)       Start:  11/08/24    Expected End:  11/29/24            Patient will complete daily grooming tasks  with minimal assist  level of assistance and PRN adaptive equipment. (Progressing)       Start:  11/08/24    Expected End:  11/29/24            Patient will complete toileting including hygiene clothing management/hygiene with moderate assist level of assistance and lrd. (Progressing)       Start:  11/08/24    Expected End:  11/29/24               COGNITION/SAFETY       Patient will follow >50% Simple commands to allow improved ADL performance. (Progressing)       Start:  11/08/24    Expected End:  11/29/24               EXERCISE/STRENGTHENING       Patient with increase BUE to wfl strength. (Progressing)       Start:  11/08/24    Expected End:  11/29/24               MOBILITY       Patient will perform Functional mobility min Household distances/Community Distances with moderate assist  level of assistance and least restrictive device in order to improve safety and functional mobility. (Progressing)       Start:  11/08/24    Expected End:  11/29/24               TRANSFERS       Patient will perform bed mobility moderate assist level of assistance and bed rails in order to improve safety and independence with mobility (Progressing)       Start:  11/08/24    Expected End:  11/29/24            Patient will complete functional transfer  least restrictive device with moderate assist level of assistance. (Progressing)       Start:  11/08/24    Expected End:  11/29/24

## 2024-11-13 NOTE — RESEARCH NOTES
Artificial Intelligence Monitoring in Nursing (AIMS Nursing) Study    Principle Investigator - Dr. Sharath Talamantes  Research Coordinator - Samina Fry RN     Patient Name - Wanda Booth  Date - 11/13/2024 2:54 PM  Location - [place] [address]    Wanda Booth was approached by Samina Fry RN to talk about participating in the AIMS Nursing Study. The patient was not able to be approached, a research coordinator will come back at a later time. Study protocol was followed and patient was given study contact information.     Samina Fry RN

## 2024-11-13 NOTE — PROGRESS NOTES
Physical Therapy    Physical Therapy Treatment    Patient Name: Wanda Booth  MRN: [id number]  Department: [place] [address]  Room: 5066/5066-B  Today's Date: 11/13/2024  Time Calculation  Start Time: 1151  Stop Time: 1205  Time Calculation (min): 14 min         Assessment/Plan   PT Assessment  PT Assessment Results: Decreased strength, Decreased endurance, Impaired balance, Decreased mobility  Assessment Comment: Pt declined sitting EOB due to fatigue but agreeable to supine exercises. Pt falling asleep during exercises and required verbal and tactile stimulation to continue with supine exercises.  End of Session Patient Position: Bed, 3 rail up     PT Plan  Treatment/Interventions: Bed mobility, Transfer training, Gait training, Balance training, Strengthening, Therapeutic exercise, Therapeutic activity  PT Plan: Ongoing PT  PT Frequency: 3 times per week  PT Discharge Recommendations: Moderate intensity level of continued care  PT Recommended Transfer Status: Assist x1  PT - OK to Discharge: Yes      General Visit Information:   PT  Visit  PT Received On: 11/13/24  General  Prior to Session Communication: Bedside nurse  Patient Position Received: Bed, 3 rail up  General Comment: Pt supine in bed speaking with doctor upon arrival. Pt pleasant and agreeable to therapy. Pt falling asleep throughout treatment and required tactile and verbal stimulation to continue with therapy.    Subjective   Precautions:  Precautions  Medical Precautions: Fall precautions    Vital Signs (Past 2hrs)        Date/Time Vitals Session Patient Position Pulse Resp SpO2 BP MAP (mmHg)    11/13/24 1125 --  --  76  17  98 %  143/83  --                         Objective   Pain:     Cognition:  Cognition  Overall Cognitive Status: Impaired  Arousal/Alertness: Delayed responses to stimuli  Orientation Level: Disoriented to time, Disoriented to place  Coordination:       Activity Tolerance:     Treatments:  Therapeutic Exercise  Therapeutic Exercise  Performed: Yes  Therapeutic Exercise Activity 1: Supine B LE AROM AP x15 AAROM heel slides, hip abduction/adduction, QS x10 pt falling asleep during exercises and required verbal and tactile stimulation to continue exercises.    Transfers  Transfer: No (Pt declined, anticipate Max A.)    Outcome Measures:  [facility] Basic Mobility  Turning from your back to your side while in a flat bed without using bedrails: A lot  Moving from lying on your back to sitting on the side of a flat bed without using bedrails: A lot  Moving to and from bed to chair (including a wheelchair): Total  Standing up from a chair using your arms (e.g. wheelchair or bedside chair): Total  To walk in hospital room: Total  Climbing 3-5 steps with railing: Total  Basic Mobility - Total Score: 8    Education Documentation  Precautions, taught by Morena Flanagan PTA at 11/13/2024 12:11 PM.  Learner: Patient  Readiness: Acceptance  Method: Explanation  Response: Verbalizes Understanding  Comment: Education in HEP.    Mobility Training, taught by Morena Flanagan PTA at 11/13/2024 12:11 PM.  Learner: Patient  Readiness: Acceptance  Method: Explanation  Response: Verbalizes Understanding  Comment: Education in HEP.    Education Comments  No comments found.        OP EDUCATION:       Encounter Problems       Encounter Problems (Active)       Mobility       STG - Patient will ambulate 15ft, wheeled walker, min assist (Progressing)       Start:  11/11/24    Expected End:  11/25/24               PT Transfers       STG - Patient to transfer to and from sit to supine min assist (Progressing)       Start:  11/11/24    Expected End:  11/25/24            STG - Patient will transfer sit to and from stand min assist (Progressing)       Start:  11/11/24    Expected End:  11/25/24

## 2024-11-13 NOTE — PROGRESS NOTES
"Nutrition Follow Up Assessment:   Nutrition Assessment         Patient is a 74 y.o. female presenting with Fall from SNF  PMHx b/l hydronephrosis (w/ r nephrostomy tube), chronic carreon, PEG, recently tx for FADIA/sepsis     11/08 s/p left nephrostomy tube placement and right nephrostomy tube exchange       Nutrition History:  Energy Intake:  (NPO)  Food and Nutrient History: Per flowsheets, pt only receiving 15 ml/hr of Isosource 1.5. Per RN, pt EN placed on hold on 11/12 for lymph node Bx, when order for TF was replaced it did not contain advancing rate. RN also noted that pt was having abdominal pain but that has been resolved and TF is back on advancement schedule. TF pump currently at 25 ml/hr.   Food Allergies/Intolerances:  None  GI Symptoms: None  Oral Problems:  missing teeth      Enteral nutrition history   RDN note from 10/10/24 recommended:   Nutren 1.5 @ 40 mL/hr X 24 hours = 960 mL TV, 1440 kcal, 65 gm protein, 733 mL free water  FWF: 150 mL X 6 - adjust PRN based on hydration status  Enteral Access: PEG  Anthropometrics:  Height: 147.3 cm (4' 10\")   Weight: 53 kg (116 lb 13.5 oz)   BMI (Calculated): 24.43           Weight History:     Wt Readings from Last 20 Encounters:   11/08/24 53 kg (116 lb 13.5 oz)   09/13/22 77.6 kg (171 lb)   08/22/22 76.7 kg (169 lb)   04/27/22 77.1 kg (170 lb)   02/24/22 76.2 kg (168 lb)   08/26/21 77.1 kg (170 lb)    10/8/24 115#  Weight Change %:  Weight History / % Weight Change: Noting no significant weight changes over the last 2 months  Significant Weight Loss: No    Nutrition Focused Physical Exam Findings:  Defer: NFPE completed on initial assessment  Subcutaneous Fat Loss:      Muscle Wasting:     Edema:   None  Physical Findings:   Positive for deep tissue PI on sacrum    Nutrition Significant Labs: Reviewed   CBC Trend:   Results from last 7 days   Lab Units 11/13/24  0736 11/12/24  0620 11/11/24  0709 11/10/24  0754   WBC AUTO x10*3/uL 20.9* 20.9* 21.7* 22.1*   RBC " "AUTO x10*6/uL 2.46* 2.60* 2.44* 2.63*   HEMOGLOBIN g/dL 7.7* 8.2* 7.7* 8.2*   HEMATOCRIT % 24.3* 26.1* 23.6* 27.0*   MCV fL 99 100 97 103*   PLATELETS AUTO x10*3/uL 477* 401 374 376    , BMP Trend:   Results from last 7 days   Lab Units 11/13/24  0736 11/12/24  1855 11/12/24  0620 11/11/24  1914   GLUCOSE mg/dL 131* 98 88 104*   CALCIUM mg/dL 11.3* 10.6 11.2* 10.9*   SODIUM mmol/L 137 136 141 138   POTASSIUM mmol/L 3.9 3.4* 3.9 3.7   CO2 mmol/L 23 23 22 22   CHLORIDE mmol/L 110* 112* 112* 109*   BUN mg/dL 16 19 22 23   CREATININE mg/dL 1.13* 1.11* 1.27* 1.26*    , A1C:  Lab Results   Component Value Date    HGBA1C 5.6 09/25/2024   , BG POCT trend:   Results from last 7 days   Lab Units 11/13/24  1209 11/13/24  0022 11/12/24  1945 11/12/24  1239 11/12/24  0804   POCT GLUCOSE mg/dL 149* 106* 94 89 86    , Renal Lab Trend:   Results from last 7 days   Lab Units 11/13/24  0736 11/12/24  1855 11/12/24  0620 11/11/24  1914   POTASSIUM mmol/L 3.9 3.4* 3.9 3.7   PHOSPHORUS mg/dL 2.9 3.1 3.4 3.3   SODIUM mmol/L 137 136 141 138   MAGNESIUM mg/dL 2.10 2.25 2.65* 3.15*   EGFR mL/min/1.73m*2 51* 52* 44* 45*   BUN mg/dL 16 19 22 23   CREATININE mg/dL 1.13* 1.11* 1.27* 1.26*    , Lipid Panel:   Lab Results   Component Value Date    CHOL 228 (H) 09/13/2022    HDL 36.2 (A) 09/13/2022    CHHDL 6.3 (A) 09/13/2022    LDLF 152 (H) 09/13/2022    VLDL 40 09/13/2022    TRIG 199 (H) 09/13/2022    , Vit D:   Lab Results   Component Value Date    VITD25 23 (L) 11/07/2024    , Iron Panel: No results found for: \"IRON\", \"TIBC\", \"FERRITIN\"     Nutrition Specific Medications:  All med's reviewed     I/O:   Last BM Date: 11/13/24; Stool Appearance: Loose (11/13/24 1057)    Dietary Orders (From admission, onward)       Start     Ordered    11/13/24 1206  Enteral feeding with NPO Isosource 1.5; PEG (percutaneous endoscopic gastric); 15; 400; Water; Tap water; Every 6 hours  Diet effective now        Comments: Advance by 10 mL q 6 hrs to goal rate of " 45 mL/hr x 24 hr, gentle increase if patient is tolerating without increasing pain, nausea, vomiting, or distention   Question Answer Comment   Tube feeding formula: Isosource 1.5    Feeding route: PEG (percutaneous endoscopic gastric)    Tube feeding continuous rate (mL/hr): 15    Tube feeding flush (mL): 400    Flush type: Water    Water type: Tap water    Flush frequency: Every 6 hours        11/13/24 1206                     Estimated Needs:   Total Energy Estimated Needs (kCal): 1500 kCal (5957-1509)  Method for Estimating Needs: 30 kcal/kg of ABW  Total Protein Estimated Needs (g): 66 g  Method for Estimating Needs: 1.2-1.5gm/kg of ABW     Method for Estimating Needs: 1mL per kcal or per team        Nutrition Diagnosis   Malnutrition Diagnosis  Patient has Malnutrition Diagnosis: Yes  Diagnosis Status: Ongoing  Malnutrition Diagnosis: Moderate malnutrition related to chronic disease or condition  As Evidenced by: severe muscle mass and adipose tissue loss, <75% of EEN met via Po for ~5days  Additional Assessment Information: Pt is NPO, has PEG was on TF PTA            Nutrition Interventions/Recommendations         Nutrition Prescription:   Enteral Nutrition        Nutrition Interventions:   Interventions: Enteral intake  Continue Isosource 1.5@ 25 mL/hr. Advance by 10 mL q 6 hrs to goal rate of 45 mL/hr x 24 hr        FWF: per team    Monitor RFP + Mg for s/s refeeding; hold TF at rate and replete lytes, if low, prior to advancing    This regimen provides 1620kcals, 73gm Pro, 825mL water     Continue to document Enteral Intake    Nutrition Education:   na       Nutrition Monitoring and Evaluation   Food/Nutrient Related History Monitoring  Monitoring and Evaluation Plan: Enteral and parenteral nutrition intake  Enteral and Parenteral Nutrition Intake: Enteral nutrition intake  Criteria: Consume >80% EER through EN    Body Composition/Growth/Weight History  Monitoring and Evaluation Plan: Weight change  Weight  Change: Weight gain, Weight loss  Criteria: Maintain stable weight    Biochemical Data, Medical Tests and Procedures  Monitoring and Evaluation Plan: Electrolyte/renal panel, Glucose/endocrine profile, Nutritional anemia profile, Vitamin profile  Criteria: WNL    Nutrition Focused Physical Findings  Monitoring and Evaluation Plan: Skin  Criteria: Promote wound healing         Time Spent (min): 30 minutes

## 2024-11-13 NOTE — CARE PLAN
Problem: Fall/Injury  Goal: Not fall by end of shift  Outcome: Progressing  Goal: Be free from injury by end of the shift  Outcome: Progressing  Goal: Verbalize understanding of personal risk factors for fall in the hospital  Outcome: Progressing  Goal: Verbalize understanding of risk factor reduction measures to prevent injury from fall in the home  Outcome: Progressing     Problem: Skin  Goal: Decreased wound size/increased tissue granulation at next dressing change  Outcome: Progressing  Goal: Participates in plan/prevention/treatment measures  Outcome: Progressing  Goal: Prevent/manage excess moisture  Outcome: Progressing  Goal: Prevent/minimize sheer/friction injuries  Outcome: Progressing  Goal: Promote/optimize nutrition  Outcome: Progressing  Goal: Promote skin healing  Outcome: Progressing

## 2024-11-13 NOTE — PROGRESS NOTES
Wanda Booth is a 74 y.o. female on day 6 of admission presenting with FADIA (acute kidney injury) (CMS-[facility]).      Subjective   NAEON. This AM, pt is AAOx1. Has abdominal pain. Discussed dispo plan with son Colten again 11/13, plan for dispo to [facility]. Updated husbad on PET findings.     Net -1.75L    Objective     Last Recorded Vitals  /83 (BP Location: Right arm, Patient Position: Lying)   Pulse 76   Temp 36.8 °C (98.2 °F) (Temporal)   Resp 17   Wt 53 kg (116 lb 13.5 oz)   SpO2 98%   Intake/Output last 3 Shifts:    Intake/Output Summary (Last 24 hours) at 11/13/2024 1420  Last data filed at 11/13/2024 1030  Gross per 24 hour   Intake 1650 ml   Output 1300 ml   Net 350 ml       Admission Weight  Weight: 72.6 kg (160 lb) (11/07/24 1206)    Daily Weight  11/08/24 : 53 kg (116 lb 13.5 oz)    Image Results  Electrocardiogram, 12-lead PRN ACS symptoms  Normal sinus rhythm  Inferior infarct , age undetermined  Abnormal ECG  When compared with ECG of 07-NOV-2024 20:02,  No significant change was found  Confirmed by Aki Vallejo ([id number]) on 11/13/2024 1:46:08 PM      Physical Exam  Constitutional:       General: She is not in acute distress.     Appearance: She is not diaphoretic.      Comments: Cachetic and ill-appearing    HENT:      Head: Normocephalic.      Comments: Laceration of r temporal face with dried blood      Nose: No rhinorrhea.      Mouth/Throat:      Mouth: Mucous membranes are dry.      Comments: Dark brown plaque on tongue. Poor dentition, only has a few teeth on the bottom low.   Eyes:      General: No scleral icterus.        Right eye: No discharge.         Left eye: No discharge.      Pupils: Pupils are equal, round, and reactive to light.   Cardiovascular:      Rate and Rhythm: Normal rate and regular rhythm.      Heart sounds: No murmur heard.     No friction rub. No gallop.   Pulmonary:      Effort: No respiratory distress.      Breath sounds: No wheezing, rhonchi or rales.  "  Chest:      Chest wall: No tenderness.   Abdominal:      General: Bowel sounds are normal. There is no distension.      Palpations: Abdomen is soft.      Tenderness: There is abdominal tenderness.      Comments: PEG. Suprapubic pain   Genitourinary:     Comments: B/l neph tubes and a carreon   Musculoskeletal:         General: No tenderness.      Right lower leg: No edema.      Left lower leg: No edema.      Comments: ability to move all extremities and turn head    Skin:     General: Skin is warm and dry.      Coloration: Skin is not jaundiced.      Findings: No rash.   Neurological:      Mental Status: She is disoriented.      Comments: Oriented to person and somewhat situation. Negative babinski. 2+strength in b/l finger        Relevant Results    Labs  Results from last 7 days   Lab Units 11/10/24  0755 11/09/24  1905 11/09/24  0937 11/08/24  1819 11/08/24  0612   SODIUM mmol/L 145 145 150* 148* 145   POTASSIUM mmol/L 3.3* 3.3* 3.6 4.0 4.0   CHLORIDE mmol/L 115* 115* 120* 118* 115*   CO2 mmol/L 22 21 22 19* 22   BUN mg/dL 30* 37* 39* 48* 53*   CREATININE mg/dL 1.87* 2.09* 2.23* 2.53* 2.81*   CALCIUM mg/dL 11.1* 11.7* 11.7* 12.9* 12.8*      Results from last 7 days   Lab Units 11/10/24  0754 11/09/24  2236 11/09/24  0937 11/08/24  0612 11/07/24  1449   HEMOGLOBIN g/dL 8.2* 7.8* 8.6* 9.4* 9.5*   WBC AUTO x10*3/uL 22.1* 20.2* 20.4* 19.3* 20.9*   PLATELETS AUTO x10*3/uL 376 366 367 271 336   HEMATOCRIT % 27.0* 25.1* 28.4* 33.1* 29.1*     Results from last 7 days   Lab Units 11/08/24  0612 11/07/24  1449   ALK PHOS U/L 79 102   BILIRUBIN TOTAL mg/dL 0.2 0.2   BILIRUBIN DIRECT mg/dL 0.2  --    PROTEIN TOTAL g/dL 6.1* 6.4   ALT U/L 13 16   AST U/L 21 26      Results from last 7 days   Lab Units 11/08/24  0612   PROTIME seconds 12.6   INR  1.1     No results found for: \"NONUHFIRE\", \"LACTATE\"    Results from last 7 days   Lab Units 11/10/24  1209 11/10/24  0755 11/10/24  0554 11/09/24  2330 11/09/24  1953 " 11/09/24  1905 11/09/24  1705 11/09/24  1203 11/09/24  0937 11/08/24  1819 11/08/24  1421 11/08/24  0612   POCT GLUCOSE mg/dL 89  --  123* 151* 170*  --  162*   < > 134*  --    < >  --    GLUCOSE mg/dL  --  100*  --   --   --  174*  --   --  132* 76  --  81    < > = values in this interval not displayed.         Relevant Results  Scheduled medications  acetaminophen, 1,000 mg, oral, q8h  cholecalciferol, 50,000 Units, oral, Weekly  heparin (porcine), 5,000 Units, subcutaneous, q8h  melatonin, 5 mg, oral, Nightly  pamidronate, 60 mg, intravenous, Once  pantoprazole, 40 mg, intravenous, Daily  QUEtiapine, 12.5 mg, oral, Nightly  traMADol, 25 mg, oral, q8h      Continuous medications       PRN medications  PRN medications: bisacodyl, polyethylene glycol      CTAP 11/7: IMPRESSION:  1. Severe right and moderate left hydroureteronephrosis. Right  percutaneous nephrostomy tube is in expected position with pigtail in  the right renal pelvis without evidence of discontinuity or kinking.  Comparison with prior outside imaging from 09/24/2024.  2. Marked hazy thickening of the urinary bladder wall with extensive  perivesical nodularity. These may represent collapsed urinary bladder  diverticula or perivesical soft tissue nodularity.  3. Few solid and few cavitary nodules throughout the bilateral lung  bases may relate to malignancy or atypical infectious process.  4. Heterogeneous appearance of the pancreatic body and tail with the  questionable ill-defined hypoattenuating area in the tail and  peripancreatic stranding. Findings can represent   sequela to known  acute pancreatitis. Underlying focal pancreatic mass can not be  excluded. Comparison with prior outside imaging from 09/24/2024 is  recommended.  5. Multiple enlarged pelvic and lower paraesophageal lymph nodes are  presumed metastatic or less likely reactive.  6. Small left and trace right pleural effusions.  7. PEG tube in place with bumper in the gastric  body.    -PET Scan 11/11: IMPRESSION:  1. Hypermetabolic bilateral pulmonary nodules, which could be  infectious versus metastatic.  2. Hypermetabolic pericardial nodule, right hilar and left  retrocrural, concerning for metastatic disease.  3. Nonspecific ill-defined focal hypermetabolic activity within the  pancreatic body, likely infective/inflammatory.  4. Hypermetabolic soft tissue implants adjacent to the uterus with  enlarged bilateral hypermetabolic pelvic lymph nodes consistent with  metastasis.    This patient has a urinary catheter   Reason for the urinary catheter remaining today? urinary retention/bladder outlet obstruction, acute or chronic      Assessment and Plan:  Ms. Wanda Booth is a 75 yo woman with PMHx b/l hydronephrosis (w/ r nephrostomy tube), chronic carreon, PEG, recently tx for FADIA/sepsis/acute pancreatitis at [facility] and completed course of cipro presenting to  ED 11/7 for recurrent falls and concern for UTI. Pt came from [facility]. In the ED pt found to have FADIA and hypercalcemia currently tx with fluids. Carreon replaced in ED and with concern for UTI blood cx and UA with cx ordered. Concern for limited drainage from neph so urology following CTAP with concern for maligancy with mets, and pancreatitis hx noted at F. Pt with AMS per son seems to be for 1 month with more exacerbation in 1 week after [facility] hospital stay and 3 falls at SNF. CTH normal and CT cervical spine also wnl after fall alertness and orientation improving with fluids, tube feeds, antibiotics. Pt completed 5 day course (CTX 11/7 changed to zosyn 11/8, then back to CTX 11/10-11/12) with neg Ucx and blood cx. Will monitor for improvement in cr still elevated but improved from admission and ca still elevated so pamidronate given once along with an IV bolus of 1L NS 11/12. IR unable to obtain lymph node biopsy because of their risk concern with proximity to vasculature but rec outpt attempt. Leukocytosis continues but  slowly improving. Complex discharge needs pending plan with family for SNF and support, may consider geriatrics/pmr consult for extra support and consideration for acute rehab.      Dispo:  PT/OT rec SNF. Discussed dispo plan with son Colten and  Torsten 11/11 and Colten 11/12, who plan for pt to go to [facility] in [place] then they will work to get her set up in [place] after rehab complete. I will continue conversations daily with family and will help in scheduling outpt follow up appointments.      Updates:  -Sitter discontinued 11/13  -Given >1L output gave 500ml ivf bolus 11/13, continue to monitor   -PET scan results noted, concern for metastatic nodules in lungs, pericardium and in the pelvic region.  -pain control with scheduled tylenol and tramadol, prn oxy as pt has limited ability to adequately ask for pain medication when needed   -Urology following and to complete cystoscopy outpt   -PET Scan 11/11: metastatic nodules in lungs, pericardial, near uterus, and in pelvic lymph nodes  -11/13 consider geriatrics/pmr consult for extra support and consideration for acute rehab.    -improving Ca and resolved Na, continue FWF to 400 ml q6hr and IVF as needed   -bid rfp/mg, monitor for refeeding (tube feeds increased to 25 ml/hr) and replete as needed    -dispo and appts as above     #Agitation, controlled   #Pain related to pulling out carreon, controlled   Plan:  -traumatic carreon pull and replacement 11/8-11/9, blood clots improving on flushing,   -changed Seroquel 12.5 q8hr prn to at bedtime 11/11 for sleep on agitation   -Sitter discontinued 11/13  -pain control with scheduled tylenol and tramadol, prn oxy as pt has limited ability to adequately ask for pain medication when needed        #AMS from infection vs #Hypercalcemic Encephalopathy vs #Dementia  -most likely hypercalcemic encephalopathy or infection related AMS in addition to dementia as pt mental status improving since admission with  fluids/antibiotics   #Hypercalcemia, improving  -Ca of 13.6 in ED  -CTH with no acute findings  -Son Colten endorses pt lived at home with  and could do all ADLs and communicate clearly 1 month ago (10/2024), son [place] out of state, recalls for conversation with pt 4-5 days ago (11/4)  -s/p 2L NS in ED  -on tele, EKG 11/7 and 11/8 with no acute changes   -ical elevated, pth wnl, pthrp, HIV/Syph neg, TSH elevated, and thyroxine wnl, b12 400s wnl, vit d low 16  Plan:  -infectious and malignant workup as below   -dispo and appts as above  -oral hygiene support daily ordered 11/12  -PET Scan 11/11: metastatic nodules in lungs, pericardial, near uterus, and in pelvic lymph nodes  -pthrp elevated   -Sitter discontinued 11/13  -Given >1L output gave 500ml ivf bolus 11/13, continue to monitor   -improving Ca and resolved Na, continue FWF to 400 ml q6hr and IVF as needed   -bid rfp/mg, monitor for refeeding (tube feeds increased to 25 ml/hr) and replete as     #Subacute cystitis  #UTI, improving  -[facility] from [facility]  #Leukocytosis, improving   -wbc 20.9 in ED  #FADIA on CKD4, improving  -Cr 3.02 on admission baseline 1.1 and elevated BUN 65 in ED   -free water deficient 0.3 L  #Chronic Calvillo, stable  -exchanged 11/7 in ED  #Traumatic Calvillo Replacement, improved  -11/8-11/9 initially had blood clots which have resolved   #B/L Nephrostomy Tube, stable  #B/L Hydronephrosis   -She has significant FADIA on CKD iso severe b/l hydronephrosis s/p R PCNT 10/3/24. -Bedside ultrasound showed b/l hydro in ED 11/7  -Labs prior to admission Ca 12.4 PTH 17 CKD4 based on Cystatin C of 1.98, baseline - -Pre-alb 9   -received 2L NS in ED  -CTAP very thick bladder wall with pelvic lymphadenopathy (1.3-1.6cm) and BL hydroureteronephrosis R>L despite  #Concern for  Malignancy with mets  -considering cervical vs bladder, continuing to investigate   -UA and cx negative, urine cytology 11/8 showed no malignant cells  -ionized ca high  ,pth wnl, urine electrolytes wnl, folate wnl  -Utox neg, + UTI workup below, HIV neg. Syph neg. TSH high, b12 wnl, vit d low 16  Plan:  - Urology following  rec: Maintain B/L PCNT (11/8) and carreon catheter              - outpt cystoscopy   - pthrp elevated  -Pain reg as above  -agitated managed as above  -dispo and appts as above  -IR LN Biopsy attempted 11/12 (target is in a sub-optimal location due to adjacent vasculature. Risks outweigh benefits of proceeding at this time.), rec try again outpt    -Completed antibiotics (CTX)11/12 after 5 day course for UTI  -PET Scan 11/11: metastatic nodules in lungs, pericardial, near uterus, and in pelvic lymph nodes  -11/13 consider geriatrics/pmr consult for extra support and consideration for acute rehab.    -blood cx 11/7 final NGTD  -improving Ca and resolved Na, continue FWF to 400 ml q6hr and IVF as needed   -bid rfp/mg, monitor for refeeding (tube feeds increased to 25 ml/hr) and replete as needed      #Pancreatitis  PET CT 11/11 showed inflammation/infection   -tx at [facility], evidence found on CTAP 11/7  Plan:  -ctm    #PEG  #Failure to thrive, improving  #Poor dentition, stable  #Normocytic Anemia, worsened  - Malnourished, prealbumin 9/2024 9.0, now s/p PEG   - C/f underlying malignancy  -poor po intake requiring PEG at [facility] 10/[facility]24  -hgb 9.5, mcv 95  -medical care prior to 2024 was in 2022  -7.7 11/11 from 8.2  -prealbumin low 13.9 11/7  Plan:  -nutrition rec: When appropriate, continue Isosource 1.5@ increased from 15 to 25 mL/hr.   Advance by 10 mL q 6 hrs to goal rate of 45 mL/hr x 24 hr  -type and screen ordered and consent on file  -transfuse if hgb 7 or less  -malignancy workup as above  -Given >1L output gave 500ml ivf bolus 11/13, continue to monitor   -improving Ca and resolved Na, continue FWF to 400 ml q6hr and IVF as needed   -bid rfp/mg, monitor for refeeding (tube feeds increased to 25 ml/hr) and replete as needed    -pt complained of feeling like stomach  would pop, monitor for tolerating feeds    #Unspecified Recurrent Falls, stable  -CTHead and CTcervical spine wnl no acute findings  - Falls likely 2/2 hypovolemia iso poor PO intake, potential sepsis or concern for malignancy   Plan:  -ctm  -on tele  -fall risk noted in orders, bed alarm needed and bracelet   -sitter discontinued 11/13  -11/13 consider geriatrics/pmr consult for extra support and consideration for acute rehab.    -dispo and appts as above    #Decubitus Ulcer  Plan:  -wound care orders updated for sacral wound 11/11    F FWF 400cc q6hr, 500ml NS 11/13  E prn K>4, Mg>2 phos>3, monitor refeeding replete  N tube feeds  A PIV, PEG, Calvillo, B/L Neph tube   DVT ppx subcutaneous hep  Bowel reg miralax prn     Code Status: FULL CODE (pt with/o capacity, confirmed status with son Colten)   NOK: Colten Booth (son) [phone], Torsten Booth () [phone]    Mariana Robertson MD

## 2024-11-13 NOTE — CARE PLAN
The patient's goals for the shift include AME    The clinical goals for the shift include Patient will remain HDS during shift

## 2024-11-13 NOTE — PROGRESS NOTES
Wanda Booth is a 74 y.o. female on day 6 of admission presenting with FADIA (acute kidney injury) (CMS-HCC).\    MIKA consulted on 11/11/24 to assist with dc planning.     SW received call from pt's , Torsten, asking for information on pt's medical status. MIKA informed Torsten that a member of the medical team will call him with updates. Torsten informed SW that he will visit pt today.    SW attempted to meet at bedside with pt and Torsten, but Torsten had already left. SW attempted to call Torsten's cell phone and left a vm.  MIKA received text messages from pt's son, Colten, asking for a referral to be sent to [facility] for pt. SW sent a referral for this facility, and they are not able to accommodate. MIKA informed Colten of this and asked for other choices.    UPDATE: Colten agreed to have referrals sent to [facility] facilities. MIKA informed Colten over text that all four [facility] facilities ([place], [place], [place], [place]) can accommodate. Colten informed MIKA that he would like for pt to go to [facility].    MIKA called Torsten (home phone) and informed him of SNF and verify he is in agreement. Torsten said he is find with this but wants to verify insurance will cover the stay since 11 days at last SNF was not covered. MIKA said she would ask [facility].    MIKA will follow.    Pt adod: 11/14-11/15  DC plan: SNF     Dolores MCCRACKEN [facility]  Care Transitions  2  [phone] or Epic Secure Chat

## 2024-11-13 NOTE — PROGRESS NOTES
Wanda Booth is a 74 y.o. female on day 5 of admission presenting with FADIA (acute kidney injury) (CMS-HCC).      Subjective   NAEON. This AM, pt is AAOx1. Has abdominal pain. Discussed dispo plan with son Colten again 11/12, who plan for pt to immediately go to [place] on discharge to receive SNF workup and home health in [place], Colten ideally needs 4 days notice prior to discharge to coordinate flight to [place], Colten understands that pt may need SNF urgently which would require discharge in [place] so he is working hard to secure placement with help.    Net -770    Objective     Last Recorded Vitals  /68   Pulse 86   Temp 37 °C (98.6 °F)   Resp 16   Wt 53 kg (116 lb 13.5 oz)   SpO2 98%   Intake/Output last 3 Shifts:    Intake/Output Summary (Last 24 hours) at 11/12/2024 1959  Last data filed at 11/12/2024 1730  Gross per 24 hour   Intake 930 ml   Output 1525 ml   Net -595 ml       Admission Weight  Weight: 72.6 kg (160 lb) (11/07/24 1206)    Daily Weight  11/08/24 : 53 kg (116 lb 13.5 oz)    Image Results  NM PET CT FDG wholebody  Narrative: Interpreted By:  Benedict Kraft and Kaur Arashdeep   STUDY:  NM PET CT FDG WHOLEBODY;  11/11/2024 11:36 am      INDICATION:  Signs/Symptoms:hyperca, concern for malignancy with mets potential  lesions in bladder and lungs, neglected pt with out consistent care.      COMPARISON:  CT abdomen and pelvis dated 11/07/2024..      ACCESSION NUMBER(S):  [id number]      ORDERING CLINICIAN:  RICK PRO      TECHNIQUE:  DIVISION OF NUCLEAR MEDICINE  POSITRON EMISSION TOMOGRAPHY (PET-CT)      The patient received an intravenous dose of 10.8 mCi of Fluorine-18  fluorodeoxyglucose (FDG).  Positron emission tomographic (PET) images  from mid thigh to skull base were then acquired after a one hour  delay. Also acquired was a contemporaneous low dose non-contrast CT  scan performed for attenuation correction of PET images and anatomic  localization.  The PET and CT images were  digitally fused for  display.  All images were acquired on a combined PET-CT scanner unit.  Some areas of FDG accumulation may be described in standardized  uptake value (SUV) units.      CODING:  Initial Treatment Strategy (PI)  Subsequent Treatment Strategy (PS)      CALIBRATION:  Dose Injection-to-Scan Interval (mins): 59 min  Mediastinal bloodpool SUV (normal 1.5-2.5): 2.6  Blood glucose: 100 mg/dL      FINDINGS:  HEAD AND NECK:  No focal hypermetabolic  soft tissue lesion is seen in the neck.  No hypermetabolic cervical lymphadenopathy is present.          CHEST:  Hypermetabolic bilateral pulmonary nodules. For example: Right upper  lobe (maximum SUV 3.8), left upper lobe (maximum SUV 4.9). Mild left  pleural effusion. Hypermetabolic right hilar with maximum SUV 5.6 and  left retrocrural with maximum SUV 9.7 lymph nodes. Focal  hypermetabolic pericardial nodule with a maximum SUV 10.5 along the  anterior pericardium. No evidence of hypermetabolic axillary  lymphadenopathy.      ABDOMEN AND PELVIS:  Non-specific ill-defined hypermetabolic activity within pancreatic  body with a maximum SUV 3.7. Mildly hypermetabolic peripancreatic  lymph node with maximum SUV 4.1. Enlarged hypermetabolic pelvic lymph  nodes along bilateral pelvic sidewalls and along the internal iliac  vessels with a maximum SUV 10.5 in left internal iliac and 8.9 in  right internal iliac lymph node. Irregular urinary bladder wall  thickening. Hypermetabolic soft tissue implants adjacent to uterus  with maximum SUV 11.3 on left. Multiple photopenic areas within the  spleen. No evidence of hypermetabolic lymphadenopathy.  Physiologic radiotracer uptake is present in the liver with excretion  into the bowel loops and the genitourinary tract. Gastrostomy tube is  seen in-situ. Bilateral nephrostomy tubes are seen in-situ.      MUSCULOSKELETAL:  No focal hypermetabolic lesion is seen in the axial or appendicular  to suggest osseous metastasis.  Linear intense hypermetabolic activity  in subcutaneous plane along anteromedial aspect of the level of the  elbow joint.      Impression: 1. Hypermetabolic bilateral pulmonary nodules, which could be  infectious versus metastatic.  2. Hypermetabolic pericardial nodule, right hilar and left  retrocrural, concerning for metastatic disease.  3. Nonspecific ill-defined focal hypermetabolic activity within the  pancreatic body, likely infective/inflammatory.  4. Hypermetabolic soft tissue implants adjacent to the uterus with  enlarged bilateral hypermetabolic pelvic lymph nodes consistent with  metastasis.          I personally reviewed the images/study and I agree with the findings  as stated by Jf Fabian MD.  This study was interpreted at  [facility], [place], [place].      Signed by: Benedict Kraft 11/11/2024 3:59 PM  Dictation workstation:   [id number]  Electrocardiogram, 12-lead PRN ACS symptoms  Normal sinus rhythm  Inferior infarct , age undetermined  Abnormal ECG  When compared with ECG of 07-NOV-2024 20:02,  No significant change was found      Physical Exam  Constitutional:       General: She is not in acute distress.     Appearance: She is not diaphoretic.      Comments: Cachetic and ill-appearing    HENT:      Head: Normocephalic.      Comments: Laceration of r temporal face with dried blood      Nose: No rhinorrhea.      Mouth/Throat:      Mouth: Mucous membranes are dry.      Comments: Dark brown plaque on tongue. Poor dentition, only has a few teeth on the bottom low.   Eyes:      General: No scleral icterus.        Right eye: No discharge.         Left eye: No discharge.      Pupils: Pupils are equal, round, and reactive to light.   Cardiovascular:      Rate and Rhythm: Normal rate and regular rhythm.      Heart sounds: No murmur heard.     No friction rub. No gallop.   Pulmonary:      Effort: No respiratory distress.      Breath sounds: No wheezing, rhonchi or  "rales.   Chest:      Chest wall: No tenderness.   Abdominal:      General: Bowel sounds are normal. There is no distension.      Palpations: Abdomen is soft.      Tenderness: There is abdominal tenderness.      Comments: PEG. Suprapubic pain   Genitourinary:     Comments: B/l neph tubes and a carreon   Musculoskeletal:         General: No tenderness.      Right lower leg: No edema.      Left lower leg: No edema.      Comments: ability to move all extremities and turn head    Skin:     General: Skin is warm and dry.      Coloration: Skin is not jaundiced.      Findings: No rash.   Neurological:      Mental Status: She is disoriented.      Comments: Oriented only to person. Negative babinski. 2+strength in b/l finger        Relevant Results    Labs  Results from last 7 days   Lab Units 11/10/24  0755 11/09/24  1905 11/09/24  0937 11/08/24  1819 11/08/24  0612   SODIUM mmol/L 145 145 150* 148* 145   POTASSIUM mmol/L 3.3* 3.3* 3.6 4.0 4.0   CHLORIDE mmol/L 115* 115* 120* 118* 115*   CO2 mmol/L 22 21 22 19* 22   BUN mg/dL 30* 37* 39* 48* 53*   CREATININE mg/dL 1.87* 2.09* 2.23* 2.53* 2.81*   CALCIUM mg/dL 11.1* 11.7* 11.7* 12.9* 12.8*      Results from last 7 days   Lab Units 11/10/24  0754 11/09/24  2236 11/09/24  0937 11/08/24  0612 11/07/24  1449   HEMOGLOBIN g/dL 8.2* 7.8* 8.6* 9.4* 9.5*   WBC AUTO x10*3/uL 22.1* 20.2* 20.4* 19.3* 20.9*   PLATELETS AUTO x10*3/uL 376 366 367 271 336   HEMATOCRIT % 27.0* 25.1* 28.4* 33.1* 29.1*     Results from last 7 days   Lab Units 11/08/24  0612 11/07/24  1449   ALK PHOS U/L 79 102   BILIRUBIN TOTAL mg/dL 0.2 0.2   BILIRUBIN DIRECT mg/dL 0.2  --    PROTEIN TOTAL g/dL 6.1* 6.4   ALT U/L 13 16   AST U/L 21 26      Results from last 7 days   Lab Units 11/08/24  0612   PROTIME seconds 12.6   INR  1.1     No results found for: \"NONUHFIRE\", \"LACTATE\"    Results from last 7 days   Lab Units 11/10/24  1209 11/10/24  0755 11/10/24  0554 11/09/24  2330 11/09/24  1953 11/09/24  1905 " 11/09/24  1705 11/09/24  1203 11/09/24  0937 11/08/24  1819 11/08/24  1421 11/08/24  0612   POCT GLUCOSE mg/dL 89  --  123* 151* 170*  --  162*   < > 134*  --    < >  --    GLUCOSE mg/dL  --  100*  --   --   --  174*  --   --  132* 76  --  81    < > = values in this interval not displayed.         Relevant Results  Scheduled medications  acetaminophen, 1,000 mg, oral, q8h  heparin (porcine), 5,000 Units, subcutaneous, q8h  melatonin, 5 mg, oral, Nightly  pamidronate, 60 mg, intravenous, Once  QUEtiapine, 12.5 mg, oral, Nightly      Continuous medications       PRN medications  PRN medications: bisacodyl, oxyCODONE, polyethylene glycol      CTAP 11/7: IMPRESSION:  1. Severe right and moderate left hydroureteronephrosis. Right  percutaneous nephrostomy tube is in expected position with pigtail in  the right renal pelvis without evidence of discontinuity or kinking.  Comparison with prior outside imaging from 09/24/2024.  2. Marked hazy thickening of the urinary bladder wall with extensive  perivesical nodularity. These may represent collapsed urinary bladder  diverticula or perivesical soft tissue nodularity.  3. Few solid and few cavitary nodules throughout the bilateral lung  bases may relate to malignancy or atypical infectious process.  4. Heterogeneous appearance of the pancreatic body and tail with the  questionable ill-defined hypoattenuating area in the tail and  peripancreatic stranding. Findings can represent   sequela to known  acute pancreatitis. Underlying focal pancreatic mass can not be  excluded. Comparison with prior outside imaging from 09/24/2024 is  recommended.  5. Multiple enlarged pelvic and lower paraesophageal lymph nodes are  presumed metastatic or less likely reactive.  6. Small left and trace right pleural effusions.  7. PEG tube in place with bumper in the gastric body.    -PET Scan 11/11: IMPRESSION:  1. Hypermetabolic bilateral pulmonary nodules, which could be  infectious versus  metastatic.  2. Hypermetabolic pericardial nodule, right hilar and left  retrocrural, concerning for metastatic disease.  3. Nonspecific ill-defined focal hypermetabolic activity within the  pancreatic body, likely infective/inflammatory.  4. Hypermetabolic soft tissue implants adjacent to the uterus with  enlarged bilateral hypermetabolic pelvic lymph nodes consistent with  metastasis.    This patient has a urinary catheter   Reason for the urinary catheter remaining today? urinary retention/bladder outlet obstruction, acute or chronic      Assessment and Plan:  Ms. Wanda Booth is a 73 yo woman with PMHx b/l hydronephrosis (w/ r nephrostomy tube), chronic carreon, PEG, recently tx for FADIA/sepsis/acute pancreatitis at [facility] and completed course of cipro presenting to  ED 11/7 for recurrent falls and concern for UTI. Pt came from [facility]. In the ED pt found to have FADIA and hypercalcemia currently tx with fluids. Carreon replaced in ED and with concern for UTI blood cx and UA with cx ordered. Concern for limited drainage from neph so urology following CTAP with concern for maligancy with mets, and pancreatitis hx noted at F. Pt with AMS per son seems to be for 1 month with more exacerbation in 1 week after [facility] hospital stay and 3 falls at SNF. CTH normal and CT cervical spine also wnl after fall alertness and orientation improving with fluids, tube feeds, antibiotics. Pt completed 5 day course (CTX 11/7 changed to zosyn 11/8, then back to CTX 11/10-11/12) with neg Ucx and blood cx. Will monitor for improvement in cr still elevated but improved from admission and ca still elevated so pamidronate given once along with an IV bolus of 1L NS 11/12. IR unable to obtain lymph node biopsy because of their risk concern with proximity to vasculature but rec outpt attempt. Leukocytosis continues but slowly improving. Complex discharge needs pending plan with family for SNF and support, may consider geriatrics/pmr  consult for extra support and consideration for acute rehab.      Dispo: Patient came to ED from [facility]. PT/OT rec SNF. Discussed dispo plan with son Colten and  Torsten 11/11 and Colten 11/12, who plan for pt to immediately move to [place] on discharge to receive SNF care and home health while living with Colten (Colten's wife is a CNA and works in home health and family has a wealth of nursing in their support system in [place]). Colten ideally needs 4 days notice prior to discharge to coordinate flight to [place] and back to [place] and facility placement. Colten informed of need for approved medically suitable facility, outpt follow up, and transportation plan prior to discharging from the hospital. I will continue conversations with him daily along with SW agreeing to support to optimize outcomes. Colten understands that pt may need SNF urgently which would require discharge in [place] ([facility], [place]) so he is working hard to secure placement with help in [place]. 1/13 consider geriatrics/pmr consult for extra support and consideration for acute rehab.      Follow up appts: Appointment at [facility] for urology 11/26 at 1pm scheduled and [facility] neph tube replacement appointment 11/14 canceled (because pt got r pcnt replaced and left placed 11/8). PCNT [facility] contact Inga Ryan . Will need to establish care with SNF, pcp, urology, oncology, geriatrics, IR all in [place] prior to discharge.     Updates:  -oral hygiene support daily ordered 11/12  -follow up with urology about cystoscopy 11/13  -given pamidronate for hyper ca and 1L Bolus NS 11/12  -IR LN Biopsy attempted 11/12 (target is in a sub-optimal location due to adjacent vasculature. Risks outweigh benefits of proceeding at this time.), rec try again outpt    -Completed antibiotics (CTX)11/12 after 5 day course for UTI  -PET Scan 11/11: metastatic nodules in lungs, pericardial, near uterus, and in pelvic lymph nodes  -11/13 consider geriatrics/pmr consult for  extra support and consideration for acute rehab.    -continue Seroquel 12.5 scheduled and prn for sleep and agitation, continue sitter  -blood cx 11/7 final NGTD  -improving Ca and resolved Na, continue FWF to 400 ml q6hr and IVF as needed   -bid rfp/mg, monitor for refeeding and replete as needed    -dispo and appts as above  -pthrp elevated      #Agitation, controlled   #Pain related to pulling out carreon, controlled   Plan:  -traumatic carreon pull and replacement 11/8-11/9, blood clots improving on flushing,   -Pain reg (suprapubic) scheduled tyl 975mg TID, oxy 2.5 mg q8hr PRN for mod to severe pain  -changed Seroquel 12.5 q8hr prn to at bedtime 11/11 for sleep on agitation and continue sitter      #AMS from infection vs #Hypercalcemic Encephalopathy vs #Dementia  -most likely hypercalcemic encephalopathy or infection related AMS in addition to dementia as pt mental status improving since admission with fluids/antibiotics   #Hypercalcemia, improving  -Ca of 13.6 in ED  -CTH with no acute findings  -Son Colten endorses pt lived at home with  and could do all ADLs and communicate clearly 1 month ago (10/2024), [place] out of state, recalls for conversation with pt 4-5 days ago (11/4)  -s/p 2L NS in ED  -on tele, EKG 11/7 and 11/8 with no acute changes   -ical elevated, pth wnl, pthrp, HIV/Syph neg, TSH elevated, and thyroxine wnl, b12 400s wnl, vit d low 16  Plan:  -infectious and malignant workup as below   -dispo and appts as above  -oral hygiene support daily ordered 11/12  -PET Scan 11/11: metastatic nodules in lungs, pericardial, near uterus, and in pelvic lymph nodes  -continue Seroquel 12.5 scheduled and prn for sleep and agitation, continue sitter  -improving Ca and resolved Na, continue FWF to 400 ml q6hr and IVF as needed   -pthrp elevated     #Subacute cystitis  #UTI, improving  -pre[facility] cipro from [facility]  #Leukocytosis, improving   -wbc 20.9 in ED  #FADIA on CKD4, improving  -Cr 3.02 on admission  baseline 1.1 and elevated BUN 65 in ED   -free water deficient 0.3 L  #Chronic Carreon, stable  -exchanged 11/7 in ED  #Traumatic Carreon Replacement, improved  -11/8-11/9 initially had blood clots which have resolved   #B/L Nephrostomy Tube, stable  #B/L Hydronephrosis   -She has significant FADIA on CKD iso severe b/l hydronephrosis s/p R PCNT 10/3/24. -Bedside ultrasound showed b/l hydro in ED 11/7  -Labs prior to admission Ca 12.4 PTH 17 CKD4 based on Cystatin C of 1.98, baseline - -Pre-alb 9   -received 2L NS in ED  -CTAP very thick bladder wall with pelvic lymphadenopathy (1.3-1.6cm) and BL hydroureteronephrosis R>L despite  #Concern for  Malignancy with mets  -considering cervical vs bladder, continuing to investigate   -UA and cx negative, urine cytology 11/8 showed no malignant cells  -ionized ca high ,pth wnl, urine electrolytes wnl, folate wnl  -Utox neg, + UTI workup below, HIV neg. Syph neg. TSH high, b12 wnl, vit d low 16  Plan:  - Urology following  rec: Maintain B/L PCNT (11/8) and carreon catheter              - Would recommend CTU (with clamped PCNT) for upper tract evaluation once renal function improve or MRU.  - pthrp elevated  -Pain reg (suprapubic) scheduled tyl 975mg TID, oxy 2.5 mg q8hr PRN for mod to severe pain  -agitated managed as above  -dispo and appts as above  -follow up with urology about cystoscopy 11/13  -given 1L Bolus NS 11/12  -IR LN Biopsy attempted 11/12 (target is in a sub-optimal location due to adjacent vasculature. Risks outweigh benefits of proceeding at this time.), rec try again outpt    -Completed antibiotics (CTX)11/12 after 5 day course for UTI  -PET Scan 11/11: metastatic nodules in lungs, pericardial, near uterus, and in pelvic lymph nodes  -11/13 consider geriatrics/pmr consult for extra support and consideration for acute rehab.    -blood cx 11/7 final NGTD  -improving Ca and resolved Na, continue FWF to 400 ml q6hr and IVF as needed   -bid rfp/mg, monitor for  refeeding and replete as needed        #Pancreatitis  PET CT 11/11 showed inflammation/infection   -tx at [facility], evidence found on CTAP 11/7  Plan:  -ctm    #PEG  #Failure to thrive, improving  #Poor dentition, stable  #Normocytic Anemia, worsened  - Malnourished, prealbumin 9/2024 9.0, now s/p PEG   - C/f underlying malignancy  -poor po intake requiring PEG at [facility] 10/2024  -hgb 9.5, mcv 95  -medical care prior to 2024 was in 2022  -7.7 11/11 from 8.2  -prealbumin low 13.9 11/7  Plan:  -nutrition rec: When appropriate, Start Isosource 1.5@ 15 mL/hr.   Advance by 10 mL q 6 hrs to goal rate of 45 mL/hr x 24 hr  -type and screen ordered and consent on file  -transfuse if hgb 7 or less  -malignancy workup as above  -oral hygiene support daily ordered 11/12  -11/13 consider geriatrics/pmr consult for extra support and consideration for acute rehab.    -improving Ca and resolved Na, continue FWF to 400 ml q6hr and IVF as needed   -bid rfp/mg, monitor for refeeding and replete as needed        #Unspecified Recurrent Falls, stable  -CTHead and CTcervical spine wnl no acute findings  - Falls likely 2/2 hypovolemia iso poor PO intake, potential sepsis or concern for malignancy   Plan:  -ctm  -on tele  -fall risk noted in orders, bed alarm needed and bracelet   -11/13 consider geriatrics/pmr consult for extra support and consideration for acute rehab.    -dispo and appts as above    #Decubitus Ulcer  Plan:  -wound care orders updated for sacral wound 11/11    F FWF 400cc q6hr, 1L NS 11/12  E prn K>4, Mg>2 phos>3, monitor refeeding replete  N tube feeds  A PIV, PEG, [place], B/L Neph tube   DVT ppx subcutaneous hep  Bowel reg miralax prn     Code Status: FULL CODE (pt with/o capacity, confirmed status with son Colten)   NOK: Colten Booth (son) [phone], Torsten Booth () [phone]    Mariana Robertson MD

## 2024-11-13 NOTE — HOSPITAL COURSE
Hospital Course:    Ms. Wanda Booth is a 75 yo woman with PMHx b/l hydronephrosis (w/ r nephrostomy tube), chronic carreon, PEG, recently tx for FADIA/sepsis/acute pancreatitis at [facility] (treated with ciprofloxacin) presented from [facility] to  ED 11/7 for recurrent falls and concern for UTI.     Baseline per son who lives out of state is that pt has had increased confusion and AMS for 1 month prior to admission, though it was more exacerbated 1 week after discharge from [facility] to [facility] where she then experienced  3 falls.     For concern of AMS with somnolence and confusion, the pt  found to have FADIA (Cr 3.02) and hypercalcemia (Ca 14.8) so pt was tx with fluids and fadia resolved over the course of stay. Pt also treated with pamidronate 11/13, and calcium normalized by 11/15. Pt has a chronic carreon so in the ED carreon was replaced for concern of UTI which was confirmed with UA, though urine cx and blood cx were NGTD. Pt completed a 5 day course of antibiotics (Ceftriaxone 11/7 changed to Zosyn 11/8, then back to Ceftriaxone 11/10-11/12). Head CT and cervical spine were unremarkable. This hypercalcemia and chronic carreon status prompted further investigation into potential malignancy. AMS improved over the course of stay but revealed a possible long standing dementia that had been unaddressed in this pt. Pt presented with leukocytosis which is downtrended but not resolved which may be driven by malignancy.     For concern of malignancy, CTAP 11/7 showed a hazy thickening of the urinary bladder wall with extensive perivesical nodularity; bilateral hydronephrosis, heterogeneous appearance of the pancreas, hypoattenuating body and tail - potentially sequela to known acute pancreatitis; and multiple enlarged pelvic and lower paraesophageal lymph nodes. These findings along with chronic carreon status and limited drainage from right nephrostomy tube prompted urology consultation. The r tube was replaced and a left tube was  inserted with IR 11/7. A systems (carreon, r neph tube, l neph tube) flowed well after that point.    A PET scan was completed 11/11 that revealed:   1. Hypermetabolic bilateral pulmonary nodules (infectious x metastatic?)  2. Hypermetabolic pericardial nodule, right hilar and left retrocrural, concerning for metastatic disease.  3. Ill-defined focal hypermetabolic activity within the pancreatic body, likely infective/inflammatory.  4. Hypermetabolic soft tissue implants adjacent to the uterus with  enlarged bilateral hypermetabolic pelvic lymph nodes consistent with  metastasis.    Interventional Radiology was unable to perform pelvic lymph node biopsy inpt due to concern with proximity to vasculature, however they recommended attempting it again outpt.      Urology recommended close follow up outpt and cystoscopy outpt (investigate primary tumor location due to bladder wall thickening CTAP 11/7). Pt has appointment to follow up with [facility], because of sooner availability.     Pt presented malnourished and with signs of neglect (failure to thrive) with reduced appetite. Dieticians were consulted and advised on nutrition a regiment being 1.5 Isosource. Goal feeds of 45 ml/hr achieved by 11/14 with free water flushes of 250 ml/hr.     Lipid panel elevated on past tests, reordered 11/15, can consider outpt medical management if continues to be elevated.     Family (son and ) were made aware of likely malignancy. Patient improved over the course of stay and is medically stable for discharge to SNF.       Medications:  -Changed Melatonin 1mg to 5mg nightly to help with sleep  -Started vitamin D once a week  -Started thiamine daily  -Start Seroquel nightly  -Start esomeprazole, STOP protonix  -Short term tramadol for pain as needed for pain  -Stop ciprofloxacin (antibiotic)  -Stop colace   -Stop lopressor, because blood pressure has been well controlled  -Stop probiotics (lactobacillus)  -Stop trospium (consider  restarting if bladder spasms reoccur)  -Start sodium phosate     Follow up:  -Please schedule an appointments with geriatrics to help with more compressive social and medical support.     -You have a primary care appointment scheduled with Dr. Bi Diamond Nov 22nd 11:15am, please arrive at 11am [facility]     - You have an oncology appointment scheduled with Dr. Damián Lopes at [facility] Nov 20th 9am     - You have an appointment scheduled with Interventional Radiology for right and left nephrostomy tube replacement Dec 27th 12pm (please arrive 11am) [facility]    - Please schedule an appointment with Interventional Radiology for lymph node biopsy     - You have an appointment scheduled at [facility] with urology on 11/26 at 1pm for follow up and cystoscopy

## 2024-11-14 LAB
ALBUMIN SERPL BCP-MCNC: 2.1 G/DL (ref 3.4–5)
ALBUMIN SERPL BCP-MCNC: 2.2 G/DL (ref 3.4–5)
ANION GAP SERPL CALC-SCNC: 10 MMOL/L (ref 10–20)
ANION GAP SERPL CALC-SCNC: 10 MMOL/L (ref 10–20)
BASOPHILS # BLD AUTO: 0.06 X10*3/UL (ref 0–0.1)
BASOPHILS NFR BLD AUTO: 0.4 %
BUN SERPL-MCNC: 14 MG/DL (ref 6–23)
BUN SERPL-MCNC: 15 MG/DL (ref 6–23)
CALCIUM SERPL-MCNC: 10 MG/DL (ref 8.6–10.6)
CALCIUM SERPL-MCNC: 9.4 MG/DL (ref 8.6–10.6)
CHLORIDE SERPL-SCNC: 108 MMOL/L (ref 98–107)
CHLORIDE SERPL-SCNC: 110 MMOL/L (ref 98–107)
CO2 SERPL-SCNC: 21 MMOL/L (ref 21–32)
CO2 SERPL-SCNC: 22 MMOL/L (ref 21–32)
CREAT SERPL-MCNC: 0.95 MG/DL (ref 0.5–1.05)
CREAT SERPL-MCNC: 0.99 MG/DL (ref 0.5–1.05)
EGFRCR SERPLBLD CKD-EPI 2021: 60 ML/MIN/1.73M*2
EGFRCR SERPLBLD CKD-EPI 2021: 63 ML/MIN/1.73M*2
EOSINOPHIL # BLD AUTO: 1.09 X10*3/UL (ref 0–0.4)
EOSINOPHIL NFR BLD AUTO: 6.6 %
ERYTHROCYTE [DISTWIDTH] IN BLOOD BY AUTOMATED COUNT: 17.8 % (ref 11.5–14.5)
GLUCOSE BLD MANUAL STRIP-MCNC: 124 MG/DL (ref 74–99)
GLUCOSE SERPL-MCNC: 141 MG/DL (ref 74–99)
GLUCOSE SERPL-MCNC: 181 MG/DL (ref 74–99)
HCT VFR BLD AUTO: 25.9 % (ref 36–46)
HGB BLD-MCNC: 7.9 G/DL (ref 12–16)
IMM GRANULOCYTES # BLD AUTO: 0.14 X10*3/UL (ref 0–0.5)
IMM GRANULOCYTES NFR BLD AUTO: 0.8 % (ref 0–0.9)
LYMPHOCYTES # BLD AUTO: 2.83 X10*3/UL (ref 0.8–3)
LYMPHOCYTES NFR BLD AUTO: 17 %
MAGNESIUM SERPL-MCNC: 1.73 MG/DL (ref 1.6–2.4)
MAGNESIUM SERPL-MCNC: 1.92 MG/DL (ref 1.6–2.4)
MCH RBC QN AUTO: 31.1 PG (ref 26–34)
MCHC RBC AUTO-ENTMCNC: 30.5 G/DL (ref 32–36)
MCV RBC AUTO: 102 FL (ref 80–100)
MONOCYTES # BLD AUTO: 0.85 X10*3/UL (ref 0.05–0.8)
MONOCYTES NFR BLD AUTO: 5.1 %
NEUTROPHILS # BLD AUTO: 11.65 X10*3/UL (ref 1.6–5.5)
NEUTROPHILS NFR BLD AUTO: 70.1 %
NRBC BLD-RTO: 0 /100 WBCS (ref 0–0)
PHOSPHATE SERPL-MCNC: 2.2 MG/DL (ref 2.5–4.9)
PHOSPHATE SERPL-MCNC: 3.5 MG/DL (ref 2.5–4.9)
PLATELET # BLD AUTO: 459 X10*3/UL (ref 150–450)
POTASSIUM SERPL-SCNC: 4.1 MMOL/L (ref 3.5–5.3)
POTASSIUM SERPL-SCNC: 4.2 MMOL/L (ref 3.5–5.3)
RBC # BLD AUTO: 2.54 X10*6/UL (ref 4–5.2)
SODIUM SERPL-SCNC: 136 MMOL/L (ref 136–145)
SODIUM SERPL-SCNC: 137 MMOL/L (ref 136–145)
WBC # BLD AUTO: 16.6 X10*3/UL (ref 4.4–11.3)

## 2024-11-14 PROCEDURE — 82947 ASSAY GLUCOSE BLOOD QUANT: CPT

## 2024-11-14 PROCEDURE — 2500000004 HC RX 250 GENERAL PHARMACY W/ HCPCS (ALT 636 FOR OP/ED): Performed by: NUTRITIONIST

## 2024-11-14 PROCEDURE — 2500000001 HC RX 250 WO HCPCS SELF ADMINISTERED DRUGS (ALT 637 FOR MEDICARE OP): Performed by: NUTRITIONIST

## 2024-11-14 PROCEDURE — 1200000002 HC GENERAL ROOM WITH TELEMETRY DAILY

## 2024-11-14 PROCEDURE — 97530 THERAPEUTIC ACTIVITIES: CPT | Mod: GP | Performed by: PHYSICAL THERAPIST

## 2024-11-14 PROCEDURE — 36415 COLL VENOUS BLD VENIPUNCTURE: CPT | Performed by: NUTRITIONIST

## 2024-11-14 PROCEDURE — 2500000002 HC RX 250 W HCPCS SELF ADMINISTERED DRUGS (ALT 637 FOR MEDICARE OP, ALT 636 FOR OP/ED): Performed by: NUTRITIONIST

## 2024-11-14 PROCEDURE — 82947 ASSAY GLUCOSE BLOOD QUANT: CPT | Performed by: NUTRITIONIST

## 2024-11-14 PROCEDURE — 2500000005 HC RX 250 GENERAL PHARMACY W/O HCPCS

## 2024-11-14 PROCEDURE — 83735 ASSAY OF MAGNESIUM: CPT | Performed by: NUTRITIONIST

## 2024-11-14 PROCEDURE — 2500000001 HC RX 250 WO HCPCS SELF ADMINISTERED DRUGS (ALT 637 FOR MEDICARE OP): Performed by: INTERNAL MEDICINE

## 2024-11-14 PROCEDURE — 2500000004 HC RX 250 GENERAL PHARMACY W/ HCPCS (ALT 636 FOR OP/ED)

## 2024-11-14 PROCEDURE — 84100 ASSAY OF PHOSPHORUS: CPT | Performed by: NUTRITIONIST

## 2024-11-14 PROCEDURE — 85025 COMPLETE CBC W/AUTO DIFF WBC: CPT | Performed by: NUTRITIONIST

## 2024-11-14 PROCEDURE — 99233 SBSQ HOSP IP/OBS HIGH 50: CPT | Performed by: NUTRITIONIST

## 2024-11-14 PROCEDURE — 2500000001 HC RX 250 WO HCPCS SELF ADMINISTERED DRUGS (ALT 637 FOR MEDICARE OP)

## 2024-11-14 RX ORDER — LANOLIN ALCOHOL/MO/W.PET/CERES
800 CREAM (GRAM) TOPICAL ONCE
Status: COMPLETED | OUTPATIENT
Start: 2024-11-14 | End: 2024-11-14

## 2024-11-14 RX ORDER — LANOLIN ALCOHOL/MO/W.PET/CERES
100 CREAM (GRAM) TOPICAL DAILY
Status: DISCONTINUED | OUTPATIENT
Start: 2024-11-14 | End: 2024-11-16 | Stop reason: HOSPADM

## 2024-11-14 ASSESSMENT — COGNITIVE AND FUNCTIONAL STATUS - GENERAL
CLIMB 3 TO 5 STEPS WITH RAILING: TOTAL
EATING MEALS: TOTAL
PERSONAL GROOMING: A LOT
WALKING IN HOSPITAL ROOM: TOTAL
MOBILITY SCORE: 10
WALKING IN HOSPITAL ROOM: TOTAL
CLIMB 3 TO 5 STEPS WITH RAILING: TOTAL
TOILETING: TOTAL
TURNING FROM BACK TO SIDE WHILE IN FLAT BAD: A LOT
TURNING FROM BACK TO SIDE WHILE IN FLAT BAD: A LOT
EATING MEALS: TOTAL
HELP NEEDED FOR BATHING: A LOT
STANDING UP FROM CHAIR USING ARMS: TOTAL
MOVING TO AND FROM BED TO CHAIR: A LOT
EATING MEALS: TOTAL
MOVING FROM LYING ON BACK TO SITTING ON SIDE OF FLAT BED WITH BEDRAILS: A LOT
PERSONAL GROOMING: A LOT
DAILY ACTIVITIY SCORE: 10
DRESSING REGULAR UPPER BODY CLOTHING: A LOT
MOVING TO AND FROM BED TO CHAIR: A LOT
CLIMB 3 TO 5 STEPS WITH RAILING: TOTAL
PERSONAL GROOMING: A LOT
DRESSING REGULAR UPPER BODY CLOTHING: A LOT
MOVING TO AND FROM BED TO CHAIR: A LOT
STANDING UP FROM CHAIR USING ARMS: A LOT
TOILETING: TOTAL
TOILETING: TOTAL
HELP NEEDED FOR BATHING: A LOT
CLIMB 3 TO 5 STEPS WITH RAILING: TOTAL
STANDING UP FROM CHAIR USING ARMS: TOTAL
DRESSING REGULAR LOWER BODY CLOTHING: A LOT
DAILY ACTIVITIY SCORE: 10
MOVING FROM LYING ON BACK TO SITTING ON SIDE OF FLAT BED WITH BEDRAILS: A LOT
STANDING UP FROM CHAIR USING ARMS: TOTAL
TURNING FROM BACK TO SIDE WHILE IN FLAT BAD: A LOT
DRESSING REGULAR UPPER BODY CLOTHING: A LOT
DAILY ACTIVITIY SCORE: 10
HELP NEEDED FOR BATHING: A LOT
MOBILITY SCORE: 9
TURNING FROM BACK TO SIDE WHILE IN FLAT BAD: A LOT
MOVING FROM LYING ON BACK TO SITTING ON SIDE OF FLAT BED WITH BEDRAILS: A LOT
WALKING IN HOSPITAL ROOM: TOTAL
WALKING IN HOSPITAL ROOM: TOTAL
MOVING FROM LYING ON BACK TO SITTING ON SIDE OF FLAT BED WITH BEDRAILS: A LOT
DRESSING REGULAR LOWER BODY CLOTHING: A LOT
DRESSING REGULAR LOWER BODY CLOTHING: A LOT
MOVING TO AND FROM BED TO CHAIR: A LOT

## 2024-11-14 ASSESSMENT — PAIN SCALES - GENERAL
PAINLEVEL_OUTOF10: 5 - MODERATE PAIN
PAINLEVEL_OUTOF10: 0 - NO PAIN

## 2024-11-14 ASSESSMENT — PAIN - FUNCTIONAL ASSESSMENT
PAIN_FUNCTIONAL_ASSESSMENT: 0-10
PAIN_FUNCTIONAL_ASSESSMENT: 0-10

## 2024-11-14 ASSESSMENT — PAIN SCALES - WONG BAKER: WONGBAKER_NUMERICALRESPONSE: HURTS LITTLE MORE

## 2024-11-14 NOTE — RESEARCH NOTES
Artificial Intelligence Monitoring in Nursing (AIMS Nursing) Study    Principle Investigator - Dr. Sharath Talamantes  Research Coordinator - Samina Fry RN     Patient Name - Wanda Booth  Date - 11/14/2024 12:26 PM  Location - [place] [address]    Wanda Booth was approached by Samina Fry RN to talk about participating in the AIMS Nursing Study. The patient was not able to be approached, a research coordinator will come back at a later time. Study protocol was followed and patient was given study contact information.     Samina Fry RN

## 2024-11-14 NOTE — DISCHARGE INSTRUCTIONS
Mrs. Booth,    You came in the hospital on 11/07 because you fell from the bed of the nursing home you were staying in. You were very weak, tired, and confused when you came in and unable to speak in complete sentences. We ran some tests and found out you had a urine infection and very high calcium levels in your blood and bad kidney function.    We started antibiotics for the infection and given IV fluids to help reduce the amount of calcium in your blood and to help your kidneys work better. You were seen by the interventional radiology doctors, who replaced the tube from your right kidney and put in a new one on your left kidney. You were also seen by the urology doctors to make sure your bladder and kidneys were functioning at their best.     We also ordered a test to investigate the possibility of cancer as that may lead to high levels of calcium in your blood. The test showed a high likelihood of cancer in your lungs, around your heart and in your pelvis. To confirm the diagnosis, a new procedure will be done as an outpatient after you leave the hospital with the Interventional Radiology team (lymph node biopsy). The urologists will also take a better look and sample from the bladder to look for cancer, this will also be done as an outpatient once you leave the hospital.     You arrived at the hospital very malnourished with a PEG tube for liquid feeding directly to the stomach. We worked with nutritionists to create a plan for you to get proper nutrients while you were in the hospital so you could be stronger. You were able to follow the plan and fed well.     Throughout your stay at the hospital, your urine infection, calcium levels and kidney function have all gotten better leading you to be discharged today medically stable. However it is very important that you follow up closely with doctors to keep treating you and finishing up tests for cancer.       Medications:  -Changed Melatonin 1mg to 5mg nightly  to help with sleep  -Started vitamin D once a week  -Started thiamine daily  -Start Seroquel nightly  -Start esomeprazole, STOP protonix  -Short term tramadol for pain as needed for pain  -Stop ciprofloxacin (antibiotic)  -Stop colace   -Stop lopressor, because blood pressure has been well controlled  -Stop probiotics (lactobacillus)  -Stop trospium (consider restarting if bladder spasms reoccur)  -Start sodium phosate     Follow up:  -Please schedule an appointments with geriatrics to help with more comprehensive social and medical support.     -You have a primary care appointment scheduled with Dr. Bi Diamond Nov 22nd 11:15am, please arrive at 11am [facility]     - You have an oncology appointment scheduled with Dr. Damián Lopes at [facility] Nov 20th 9am     - You have an appointment scheduled with Interventional Radiology for right and left nephrostomy tube replacement Dec 27th 12pm (please arrive 11am) [facility]    - Please schedule an appointment with Interventional Radiology for lymph node biopsy     - You have an appointment scheduled at [facility] with urology on 11/26 at 1pm for follow up and cystoscopy     Sincerely,     [facility]

## 2024-11-14 NOTE — CARE PLAN
The patient's goals for the shift include rest.    The clinical goals for the shift include Pt will remain free from falls/injury for duration of shift.    Over the shift, the patient made progress towards all goals.

## 2024-11-14 NOTE — PROGRESS NOTES
Wanda Booth is a 74 y.o. female on day 7 of admission presenting with FADIA (acute kidney injury) (CMS-[facility]).      Subjective   NAEON. This AM, pt is AAOx2. Denies abdominal pain. On later exam pt reported being cold and was actively shivering. Updated son and  on medical status.     Net -945L    Objective     Last Recorded Vitals  /80   Pulse 92   Temp 36.5 °C (97.7 °F)   Resp 17   Wt 53 kg (116 lb 13.5 oz)   SpO2 98%   Intake/Output last 3 Shifts:    Intake/Output Summary (Last 24 hours) at 11/14/2024 1339  Last data filed at 11/14/2024 0900  Gross per 24 hour   Intake 1038.02 ml   Output 2208 ml   Net -1169.98 ml       Admission Weight  Weight: 72.6 kg (160 lb) (11/07/24 1206)    Daily Weight  11/08/24 : 53 kg (116 lb 13.5 oz)    Image Results  Electrocardiogram, 12-lead PRN ACS symptoms  Normal sinus rhythm  Inferior infarct , age undetermined  Abnormal ECG  When compared with ECG of 07-NOV-2024 20:02,  No significant change was found  Confirmed by Aki Vallejo ([id number]) on 11/13/2024 1:46:08 PM      Physical Exam  Constitutional:       General: She is not in acute distress.     Appearance: She is not diaphoretic.      Comments: Cachetic, shivering    HENT:      Head: Normocephalic and atraumatic.      Nose: No rhinorrhea.      Mouth/Throat:      Comments: Dark brown plaque on tongue. Poor dentition, only has a few teeth on the bottom low. Dry but improving    Eyes:      General: No scleral icterus.        Right eye: No discharge.         Left eye: No discharge.      Pupils: Pupils are equal, round, and reactive to light.   Cardiovascular:      Rate and Rhythm: Normal rate and regular rhythm.      Heart sounds: No murmur heard.     No friction rub. No gallop.   Pulmonary:      Effort: No respiratory distress.      Breath sounds: No wheezing, rhonchi or rales.   Chest:      Chest wall: No tenderness.   Abdominal:      General: Bowel sounds are normal. There is no distension.      Palpations: Abdomen  "is soft.      Tenderness: There is no abdominal tenderness.      Comments: PEG. Suprapubic pain   Genitourinary:     Comments: B/l neph tubes and a carreon   Musculoskeletal:         General: No tenderness.      Right lower leg: No edema.      Left lower leg: No edema.      Comments: ability to move all extremities and turn head    Skin:     General: Skin is warm and dry.      Coloration: Skin is not jaundiced.      Findings: No rash.   Neurological:      Mental Status: She is disoriented.      Comments: Oriented to person and location. Negative babinski. 2+strength in b/l finger    Psychiatric:         Mood and Affect: Mood normal.         Behavior: Behavior normal.       Relevant Results    Labs  Results from last 7 days   Lab Units 11/10/24  0755 11/09/24  1905 11/09/24  0937 11/08/24  1819 11/08/24  0612   SODIUM mmol/L 145 145 150* 148* 145   POTASSIUM mmol/L 3.3* 3.3* 3.6 4.0 4.0   CHLORIDE mmol/L 115* 115* 120* 118* 115*   CO2 mmol/L 22 21 22 19* 22   BUN mg/dL 30* 37* 39* 48* 53*   CREATININE mg/dL 1.87* 2.09* 2.23* 2.53* 2.81*   CALCIUM mg/dL 11.1* 11.7* 11.7* 12.9* 12.8*      Results from last 7 days   Lab Units 11/10/24  0754 11/09/24  2236 11/09/24  0937 11/08/24  0612 11/07/24  1449   HEMOGLOBIN g/dL 8.2* 7.8* 8.6* 9.4* 9.5*   WBC AUTO x10*3/uL 22.1* 20.2* 20.4* 19.3* 20.9*   PLATELETS AUTO x10*3/uL 376 366 367 271 336   HEMATOCRIT % 27.0* 25.1* 28.4* 33.1* 29.1*     Results from last 7 days   Lab Units 11/08/24  0612 11/07/24  1449   ALK PHOS U/L 79 102   BILIRUBIN TOTAL mg/dL 0.2 0.2   BILIRUBIN DIRECT mg/dL 0.2  --    PROTEIN TOTAL g/dL 6.1* 6.4   ALT U/L 13 16   AST U/L 21 26      Results from last 7 days   Lab Units 11/08/24  0612   PROTIME seconds 12.6   INR  1.1     No results found for: \"NONUHFIRE\", \"LACTATE\"    Results from last 7 days   Lab Units 11/10/24  1209 11/10/24  0755 11/10/24  0554 11/09/24  2330 11/09/24  1953 11/09/24  1905 11/09/24  1705 11/09/24  1203 11/09/24  0937 " 11/08/24  1819 11/08/24  1421 11/08/24  0612   POCT GLUCOSE mg/dL 89  --  123* 151* 170*  --  162*   < > 134*  --    < >  --    GLUCOSE mg/dL  --  100*  --   --   --  174*  --   --  132* 76  --  81    < > = values in this interval not displayed.         Relevant Results  Scheduled medications  acetaminophen, 1,000 mg, oral, q8h  cholecalciferol, 50,000 Units, oral, Weekly  heparin (porcine), 5,000 Units, subcutaneous, q8h  melatonin, 5 mg, oral, Nightly  pantoprazole, 40 mg, intravenous, Daily  QUEtiapine, 12.5 mg, oral, Nightly  thiamine, 100 mg, oral, Daily  traMADol, 25 mg, oral, q8h      Continuous medications       PRN medications  PRN medications: bisacodyl, polyethylene glycol      CTAP 11/7: IMPRESSION:  1. Severe right and moderate left hydroureteronephrosis. Right  percutaneous nephrostomy tube is in expected position with pigtail in  the right renal pelvis without evidence of discontinuity or kinking.  Comparison with prior outside imaging from 09/24/2024.  2. Marked hazy thickening of the urinary bladder wall with extensive  perivesical nodularity. These may represent collapsed urinary bladder  diverticula or perivesical soft tissue nodularity.  3. Few solid and few cavitary nodules throughout the bilateral lung  bases may relate to malignancy or atypical infectious process.  4. Heterogeneous appearance of the pancreatic body and tail with the  questionable ill-defined hypoattenuating area in the tail and  peripancreatic stranding. Findings can represent   sequela to known  acute pancreatitis. Underlying focal pancreatic mass can not be  excluded. Comparison with prior outside imaging from 09/24/2024 is  recommended.  5. Multiple enlarged pelvic and lower paraesophageal lymph nodes are  presumed metastatic or less likely reactive.  6. Small left and trace right pleural effusions.  7. PEG tube in place with bumper in the gastric body.    -PET Scan 11/11: IMPRESSION:  1. Hypermetabolic bilateral pulmonary  nodules, which could be  infectious versus metastatic.  2. Hypermetabolic pericardial nodule, right hilar and left  retrocrural, concerning for metastatic disease.  3. Nonspecific ill-defined focal hypermetabolic activity within the  pancreatic body, likely infective/inflammatory.  4. Hypermetabolic soft tissue implants adjacent to the uterus with  enlarged bilateral hypermetabolic pelvic lymph nodes consistent with  metastasis.    This patient has a urinary catheter   Reason for the urinary catheter remaining today? urinary retention/bladder outlet obstruction, acute or chronic      Assessment and Plan:  Ms. Wanda Booth is a 73 yo woman with PMHx b/l hydronephrosis (w/ r nephrostomy tube), chronic carreon, PEG, recently tx for FADIA/sepsis/acute pancreatitis at [facility] and completed course of cipro presenting to  ED 11/7 for recurrent falls and concern for UTI. Pt came from [facility]. In the ED pt found to have FADIA and hypercalcemia currently tx with fluids. Carreon replaced in ED and with concern for UTI blood cx and UA with cx ordered. Concern for limited drainage from neph so urology following CTAP with concern for maligancy with mets, and pancreatitis hx noted at [facility]. Pt with AMS per son seems to be for 1 month with more exacerbation in 1 week after [facility] hospital stay and 3 falls at SNF. CTH normal and CT cervical spine also wnl after fall alertness and orientation improving with fluids, tube feeds, antibiotics. Pt completed 5 day course (CTX 11/7 changed to zosyn 11/8, then back to CTX 11/10-11/12) with neg Ucx and blood cx. Cr normalized over course of stay from from 3.02 and 0.99.  Ca still elevated though downtrending (corrected ca 11/14 11.5 on admission 11/7 14.8),  pamidronate given once 11/12. Continuing to monitor fluid needs with boluses and adjustments to FWF with tube feeds. 11/14 decreased FWF from 400 to 250 q6h. IR unable to obtain lymph node biopsy because of their risk concern with proximity  to vasculature but rec outpt attempt. Leukocytosis continues but slowly improving. Complex discharge needs pending plan with family for SNF and support, may consider geriatrics consult for extra support.      Dispo:  PT/OT rec SNF. Discussed dispo plan with son Colten and  Torsten plan for pt to go to [facility] in [place] then they will work to get her set up in [place] after rehab complete. I will continue conversations daily with family and will help in scheduling outpt follow up appointments. Tentative dispo 11/15-11/16, precert pending.       Updates:  -Ca still elevated though downtrending (corrected ca 11/14 11.5)  -FWF with tube feeds. 11/14 decreased FWF from 400 to 250 q6h   -discontinued telemetry 11/14  -Sitter discontinued 11/13  -Continue to monitor fluid needs and bolus as needed   -PET scan results noted, concern for metastatic nodules in lungs, pericardium and in the pelvic region.  -Continue pain control   -11/14 consider geriatrics for extra support    -bid rfp/mg, monitor for refeeding (tube feeds at goal 45 ml/hr) and replete as needed    -dispo and appts as above     #Agitation, controlled   #Pain related to pulling out carreon, controlled   Plan:  -traumatic carreon pull and replacement 11/8-11/9, blood clots improving on flushing,   -changed Seroquel 12.5 q8hr prn to at bedtime 11/11 for sleep on agitation   -Sitter discontinued 11/13  -pain control with scheduled tylenol and tramadol, prn oxy as pt has limited ability to adequately ask for pain medication when needed        #AMS from infection vs #Hypercalcemic Encephalopathy vs #Dementia  -most likely hypercalcemic encephalopathy or infection related AMS in addition to dementia as pt mental status improving since admission with fluids/antibiotics   #Hypercalcemia, improving  -Ca of 13.6 in ED  -CTH with no acute findings  -Son Colten endorses pt lived at home with  and could do all ADLs and communicate clearly 1 month ago (10/2024),  son lives out of state, recalls for conversation with pt 4-5 days ago (11/4)  -s/p 2L NS in ED  -on tele, EKG 11/7 and 11/8 with no acute changes   -ical elevated, pth wnl, pthrp, HIV/Syph neg, TSH elevated, and thyroxine wnl, b12 400s wnl, vit d low 16  -PET Scan 11/11: metastatic nodules in lungs, pericardial, near uterus, and in pelvic lymph nodes  -pthrp elevated   Plan:  -infectious and malignant workup as below   -dispo and appts as above  -oral hygiene support daily ordered 11/12  -Sitter discontinued 11/13  -Ca still elevated though downtrending (corrected ca 11/14 11.5)  -FWF with tube feeds. 11/14 decreased FWF from 400 to 250 q6h   -discontinued telemetry 11/14  -Continue to monitor fluid needs and bolus as needed     #Subacute cystitis  #UTI, improving  -previously on cipro from [facility]  #Leukocytosis, improving   -wbc 20.9 in ED  #FADIA on CKD4, improving  -Cr 3.02 on admission baseline 1.1 and elevated BUN 65 in ED   -free water deficient 0.3 L  #Chronic Calvillo, stable  -exchanged 11/7 in ED  #Traumatic Calvillo Replacement, improved  -11/8-11/9 initially had blood clots which have resolved   #B/L Nephrostomy Tube, stable  #B/L Hydronephrosis   -She has significant FADIA on CKD iso severe b/l hydronephrosis s/p R PCNT 10/3/24. -Bedside ultrasound showed b/l hydro in ED 11/7  -Labs prior to admission Ca 12.4 PTH 17 CKD4 based on Cystatin C of 1.98, baseline - -Pre-alb 9   -received 2L NS in ED  -CTAP very thick bladder wall with pelvic lymphadenopathy (1.3-1.6cm) and BL hydroureteronephrosis R>L despite  #Concern for  Malignancy with mets  -considering cervical vs bladder, continuing to investigate   -UA and cx negative, urine cytology 11/8 showed no malignant cells  -ionized ca high ,pth wnl, urine electrolytes wnl, folate wnl  -Utox neg, + UTI workup below, HIV neg. Syph neg. TSH high, b12 wnl, vit d low 16  - pthrp elevated  -Completed antibiotics (CTX)11/12 after 5 day course for UTI  -PET Scan 11/11:  metastatic nodules in lungs, pericardial, near uterus, and in pelvic lymph nodes  -blood cx 11/7 final NGTD  Plan:  - Urology following  rec: Maintain B/L PCNT (11/8) and carreon catheter              - outpt cystoscopy   -Pain reg as above  -agitated managed as above  -dispo and appts as above  -IR LN Biopsy attempted 11/12 (target is in a sub-optimal location due to adjacent vasculature. Risks outweigh benefits of proceeding at this time.), rec try again outpt    -FWF with tube feeds. 11/14 decreased FWF from 400 to 250 q6h   -bid rfp/mg, monitor for refeeding (tube feeds at goal 45 ml/hr) and replete as needed        #Pancreatitis  PET CT 11/11 showed inflammation/infection   -tx at [facility], evidence found on CTAP 11/7  Plan:  -ctm    #PEG  #Failure to thrive, improving  #Poor dentition, stable  #Normocytic Anemia, worsened  - Malnourished, prealbumin 9/2024 9.0, now s/p PEG   - C/f underlying malignancy  -poor po intake requiring PEG at f 10/2024  -hgb 9.5, mcv 95  -medical care prior to 2024 was in 2022  -7.7 11/11 from 8.2  -prealbumin low 13.9 11/7  Plan:  -nutrition rec: When appropriate, continue Isosource 1.5@ goal 45 ml/hr pending recs for bolus feeds, FWF 11/14 decreased from 400 to 250 Q6H.   -type and screen ordered and consent on file  -transfuse if hgb 7 or less  -malignancy workup as above  -bid rfp/mg, monitor for refeeding and replete as needed    -Ca still elevated though downtrending (corrected ca 11/14 11.5)  -Continue to monitor fluid needs and bolus as needed   -11/14 consider geriatrics for extra support        #Unspecified Recurrent Falls, stable  -CTHead and CTcervical spine wnl no acute findings  - Falls likely 2/2 hypovolemia iso poor PO intake, potential sepsis or concern for malignancy   Plan:  -ctm  -stopped tele 11/14   -fall risk noted in orders, bed alarm needed and bracelet   -sitter discontinued 11/13  -11/13 consider geriatrics consult for extra support   -dispo and appts as  above    #Decubitus Ulcer  Plan:  -wound care orders updated for sacral wound 11/11    F FWF 250cc q6hr,   E prn K>4, Mg>2 phos>3, monitor refeeding replete  N tube feeds  A PIV, PEG, Calvillo, B/L Neph tube   DVT ppx subcutaneous hep  Bowel reg miralax prn     Code Status: FULL CODE (pt with/o capacity, confirmed status with son Colten)   NOK: Colten Booth (son) [phone], Torsten Booth () [phone]    Mariana Robertson MD

## 2024-11-14 NOTE — PROGRESS NOTES
Physical Therapy    Physical Therapy Treatment    Patient Name: Wanda Booth  MRN: [id number]  Department: [place] [address]  Room: 5066/5066-B  Today's Date: 11/14/2024  Time Calculation  Start Time: 1320  Stop Time: 1344  Time Calculation (min): 24 min         Assessment/Plan   PT Assessment  PT Assessment Results: Decreased strength, Decreased endurance, Impaired balance, Decreased mobility  Rehab Prognosis: Good  Barriers to Discharge: none  Evaluation/Treatment Tolerance: Patient limited by fatigue  Medical Staff Made Aware: Yes  Barriers to Participation: Comorbidities  End of Session Communication: Bedside nurse  Assessment Comment: pt tolerated sitting EOB and trying to talk about being from [place]. Much more alert and following more commands. pt continues to be appropriate for services to improve functional mobility  End of Session Patient Position: Alarm on, Bed, 3 rail up  PT Plan  Inpatient/Swing Bed or Outpatient: Inpatient  PT Plan  Treatment/Interventions: Bed mobility, Transfer training, Gait training, Stair training, Balance training, Neuromuscular re-education, Endurance training, Strengthening, Range of motion, Therapeutic exercise, Therapeutic activity  PT Plan: Ongoing PT  PT Frequency: 3 times per week  PT Discharge Recommendations: Moderate intensity level of continued care  PT Recommended Transfer Status: Assist x1  PT - OK to Discharge: Yes      General Visit Information:   PT  Visit  PT Received On: 11/14/24  General  Reason for Referral: fall  Past Medical History Relevant to Rehab: 74 y.o. 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  Family/Caregiver Present: Yes  Caregiver Feedback: RN performing hygiene.  Prior to Session Communication: Bedside  nurse  Patient Position Received: Bed, 3 rail up, Alarm on  General Comment: improved alertness and able to participate and follow commands at least 50% of the time first time.    Subjective   Precautions:  Precautions  Medical Precautions: Fall precautions    Vital Signs (Past 2hrs)                 Objective   Pain:  Pain Assessment  Pain Assessment: 0-10  0-10 (Numeric) Pain Score: 0 - No pain  Cognition:  Cognition  Orientation Level: Disoriented to time, Disoriented to situation  Coordination:  Movements are Fluid and Coordinated: No  Postural Control:  Postural Control  Postural Control: Impaired  Posture Comment: flexed/kyphotic posture  Static Sitting Balance  Static Sitting-Balance Support: Bilateral upper extremity supported  Static Sitting-Level of Assistance: Minimum assistance (but progressing to CGA after several mins of sitting> pt sat EOB for 10 mins)  Static Standing Balance  Static Standing-Comment/Number of Minutes: no standing today as pt became fatigued.    Activity Tolerance:  Activity Tolerance  Endurance: Tolerates 10 - 20 min exercise with multiple rests  Activity Tolerance Comments: fatigues easily  Treatments:  Therapeutic Exercise  Therapeutic Exercise Performed: Yes  Therapeutic Exercise Activity 1: Sitting: 2 x 5 SAQ B    Therapeutic Activity  Therapeutic Activity Performed: Yes  Therapeutic Activity 1: sitting EOB for 10 mins working on balance and posture while cued.    Bed Mobility  Bed Mobility: Yes  Bed Mobility 1  Bed Mobility 1: Supine to sitting, Sitting to supine  Level of Assistance 1: Moderate assistance  Bed Mobility Comments 1: HOB elevated and use of draw sheet  Bed Mobility 2  Bed Mobility  2: Rolling right, Rolling left  Level of Assistance 2: Maximum assistance    Ambulation/Gait Training  Ambulation/Gait Training Performed: No  Transfers  Transfer: No         Outcome Measures:  [facility] Basic Mobility  Turning from your back to your side while in a flat bed without using  bedrails: A lot  Moving from lying on your back to sitting on the side of a flat bed without using bedrails: A lot  Moving to and from bed to chair (including a wheelchair): A lot  Standing up from a chair using your arms (e.g. wheelchair or bedside chair): A lot  To walk in hospital room: Total  Climbing 3-5 steps with railing: Total  Basic Mobility - Total Score: 10    Education Documentation  Precautions, taught by Claudette Carrington PT at 11/14/2024  1:51 PM.  Learner: Patient  Readiness: Acceptance  Method: Explanation  Response: Verbalizes Understanding, Needs Reinforcement    Mobility Training, taught by Claudette Carrington, PT at 11/14/2024  1:51 PM.  Learner: Patient  Readiness: Acceptance  Method: Explanation  Response: Verbalizes Understanding, Needs Reinforcement    Education Comments  No comments found.        OP EDUCATION:       Encounter Problems       Encounter Problems (Active)       Mobility       STG - Patient will ambulate 15ft, wheeled walker, min assist (Progressing)       Start:  11/11/24    Expected End:  11/25/24               PT Transfers       STG - Patient to transfer to and from sit to supine min assist (Progressing)       Start:  11/11/24    Expected End:  11/25/24            STG - Patient will transfer sit to and from stand min assist (Progressing)       Start:  11/11/24    Expected End:  11/25/24

## 2024-11-14 NOTE — PROGRESS NOTES
Jaja Booth is a 74 y.o. female on hospital day 6     Assessment and Plan     Seen on rounds. No acute overnight events  Is awake, alert but confused.  Continues to endorse some lower abdominal pain but no other new symptoms.       Objective   Afebrile overnight, blood pressure fairly well-controlled, saturating well on room air.  Exam     Vitals:    11/13/24 0021 11/13/24 1125 11/13/24 1612 11/13/24 2033   BP: 128/70 143/83 134/72 143/73   BP Location:  Right arm     Patient Position:  Lying     Pulse: 88 76 72 89   Resp: 16 17 16 16   Temp: 37 °C (98.6 °F) 36.8 °C (98.2 °F) 36.9 °C (98.4 °F) 36.4 °C (97.5 °F)   TempSrc: Temporal Temporal     SpO2: 99% 98% 97% 97%   Weight:       Height:          Intake/Output last 3 shifts:  I/O last 3 completed shifts:  In: 1650 (31.1 mL/kg) [NG/GT:1650]  Out: 2725 (51.4 mL/kg) [Urine:2725 (1.4 mL/kg/hr)]  Weight: 53 kg     Physical Exam   Awake and alert, but confused, no distress  Lungs with fair air entry anteriorly, no wheeze/ rales.  Reg rate, rhythm.   Bilateral nephrostomy tubes in place noted to have clear urine.  Calvillo catheter with urine, blood-tinged.  No lower extremity edema, lower abdominal tenderness but no guarding or rigidity.  PEG tube in place.      Medications   acetaminophen, 1,000 mg, oral, q8h  cholecalciferol, 50,000 Units, oral, Weekly  heparin (porcine), 5,000 Units, subcutaneous, q8h  melatonin, 5 mg, oral, Nightly  pantoprazole, 40 mg, intravenous, Daily  QUEtiapine, 12.5 mg, oral, Nightly  sodium chloride, 500 mL, intravenous, Once  traMADol, 25 mg, oral, q8h       PRN medications: bisacodyl, polyethylene glycol       Labs     All new labs reviewed:  some of the basic labs as follows -     Results from last 7 days   Lab Units 11/13/24  0736 11/12/24  0620 11/11/24  0709   WBC AUTO x10*3/uL 20.9* 20.9* 21.7*   HEMOGLOBIN g/dL 7.7* 8.2* 7.7*   HEMATOCRIT % 24.3* 26.1* 23.6*   PLATELETS AUTO x10*3/uL 477* 401 374   NEUTROS PCT AUTO %  "67.0 66.7 61.2   LYMPHS PCT AUTO % 19.5 18.7 23.0   MONOS PCT AUTO % 7.2 5.4 6.1   EOS PCT AUTO % 5.3 8.2 8.3          Results from last 72 hours   Lab Units 11/13/24 1827 11/13/24  0736 11/12/24  1855   SODIUM mmol/L 138 137 136   POTASSIUM mmol/L 3.7 3.9 3.4*   CHLORIDE mmol/L 112* 110* 112*   CO2 mmol/L 21 23 23   BUN mg/dL 15 16 19   CREATININE mg/dL 1.08* 1.13* 1.11*     Results from last 72 hours   Lab Units 11/13/24 1827 11/13/24  0736 11/12/24  1855   ALBUMIN g/dL 2.0* 2.1* 1.9*     Results from last 72 hours   Lab Units 11/13/24 1827 11/13/24 0736 11/12/24  1855 11/12/24  0620 11/11/24  1914 11/11/24  0709   GLUCOSE mg/dL 131* 131* 98 88 104* 133*         No results found for: \"TR1\"  Lab Results   Component Value Date    URINECULTURE No growth 11/07/2024    BLOODCULT No growth at 4 days -  FINAL REPORT 11/07/2024    BLOODCULT No growth at 4 days -  FINAL REPORT 11/07/2024            Imaging   Electrocardiogram, 12-lead PRN ACS symptoms  Normal sinus rhythm  Inferior infarct , age undetermined  Abnormal ECG  When compared with ECG of 07-NOV-2024 20:02,  No significant change was found  Confirmed by Aki Vallejo ([id number]) on 11/13/2024 1:46:08 PM     No results found for this or any previous visit from the past 1095 days.     Encounter Date: 11/07/24   Electrocardiogram, 12-lead PRN ACS symptoms   Result Value    Ventricular Rate 77    Atrial Rate 77    OK Interval 158    QRS Duration 88    QT Interval 358    QTC Calculation(Bazett) 405    P Axis 56    R Axis 4    T Axis 68    QRS Count 13    Q Onset 217    P Onset 138    P Offset 186    T Offset 396    QTC Fredericia 389    Narrative    Normal sinus rhythm  Inferior infarct , age undetermined  Abnormal ECG  When compared with ECG of 07-NOV-2024 20:02,  No significant change was found  Confirmed by Aki Vallejo ([id number]) on 11/13/2024 1:46:08 PM           Labs noted, and reviewed.  Creatinine trended down to 1.13, calcium still elevated at 11.3, corrected, " albumin 2.1.  Leukocytosis with white blood cell count of 20.9, hemoglobin 7.7, platelets of 4 77K     FADIA on CKD, with creatinine trending down to baseline.  Continues to have good urine output, monitor for post ATN diuresis.  Monitor I's and O's. Will hydrate gently to match for her urine output.   Received dose of pamidronic acid for her hypercalcemia, would expect to see results in the next 48 to 72 hours.  In terms of her mentation patient appears to be close to her baseline, concern for underlying cognitive impairment.  Will discontinue sitter.  Continue pain control.  Discussed with urology, unable to get IR guided biopsy given close proximity to vessels, no plans for inpatient biopsy.  Will schedule for outpatient follow-up.     Continue tube feeds.  Will plan discharge to SNF with close outpatient follow-up with urology and PCP.      DVT prophylaxis    Physical therapy/Occupational Therapy  to SNF    Estela Lou MD MPH     Of note the above was done with Dragon dictation system.  Note was proofread to minimize errors.

## 2024-11-14 NOTE — PROGRESS NOTES
Wanda Booth is a 74 y.o. female on day 7 of admission presenting with FADIA (acute kidney injury) (CMS-HCC).    SW consulted on 11/11/24 to assist with dc planning.     SW informed that pt's spouse, Torsten, would like to confirm that insurance will cover pt's stay at [facility]. SW discussed this with SNF, who spoke to pt's insurance. According to [facility], pt has 72 skilled days left. SW notified pt's son, Colten, of this and will call Torsten.    SW will follow.    Pt adod: 11/14-11/15  DC plan: [facility]    Dolores Bell MSW [facility]  Care Transitions  2  [phone] or Epic Secure Chat

## 2024-11-15 LAB
ALBUMIN SERPL BCP-MCNC: 2 G/DL (ref 3.4–5)
ALBUMIN SERPL BCP-MCNC: 2.2 G/DL (ref 3.4–5)
ANION GAP SERPL CALC-SCNC: 10 MMOL/L (ref 10–20)
ANION GAP SERPL CALC-SCNC: 10 MMOL/L (ref 10–20)
BASOPHILS # BLD AUTO: 0.08 X10*3/UL (ref 0–0.1)
BASOPHILS NFR BLD AUTO: 0.5 %
BUN SERPL-MCNC: 16 MG/DL (ref 6–23)
BUN SERPL-MCNC: 17 MG/DL (ref 6–23)
CALCIUM SERPL-MCNC: 8.5 MG/DL (ref 8.6–10.6)
CALCIUM SERPL-MCNC: 9.1 MG/DL (ref 8.6–10.6)
CHLORIDE SERPL-SCNC: 109 MMOL/L (ref 98–107)
CHLORIDE SERPL-SCNC: 110 MMOL/L (ref 98–107)
CHOLEST SERPL-MCNC: 74 MG/DL (ref 0–199)
CHOLESTEROL/HDL RATIO: 4.6
CO2 SERPL-SCNC: 23 MMOL/L (ref 21–32)
CO2 SERPL-SCNC: 24 MMOL/L (ref 21–32)
CREAT SERPL-MCNC: 0.92 MG/DL (ref 0.5–1.05)
CREAT SERPL-MCNC: 0.98 MG/DL (ref 0.5–1.05)
EGFRCR SERPLBLD CKD-EPI 2021: 61 ML/MIN/1.73M*2
EGFRCR SERPLBLD CKD-EPI 2021: 65 ML/MIN/1.73M*2
EOSINOPHIL # BLD AUTO: 1.1 X10*3/UL (ref 0–0.4)
EOSINOPHIL NFR BLD AUTO: 6.4 %
ERYTHROCYTE [DISTWIDTH] IN BLOOD BY AUTOMATED COUNT: 18.1 % (ref 11.5–14.5)
GLUCOSE BLD MANUAL STRIP-MCNC: 158 MG/DL (ref 74–99)
GLUCOSE BLD MANUAL STRIP-MCNC: 165 MG/DL (ref 74–99)
GLUCOSE BLD MANUAL STRIP-MCNC: 179 MG/DL (ref 74–99)
GLUCOSE SERPL-MCNC: 129 MG/DL (ref 74–99)
GLUCOSE SERPL-MCNC: 152 MG/DL (ref 74–99)
HCT VFR BLD AUTO: 24.4 % (ref 36–46)
HDLC SERPL-MCNC: 16 MG/DL
HGB BLD-MCNC: 7.7 G/DL (ref 12–16)
IMM GRANULOCYTES # BLD AUTO: 0.17 X10*3/UL (ref 0–0.5)
IMM GRANULOCYTES NFR BLD AUTO: 1 % (ref 0–0.9)
LDLC SERPL CALC-MCNC: 25 MG/DL
LYMPHOCYTES # BLD AUTO: 4.43 X10*3/UL (ref 0.8–3)
LYMPHOCYTES NFR BLD AUTO: 25.7 %
MAGNESIUM SERPL-MCNC: 1.78 MG/DL (ref 1.6–2.4)
MAGNESIUM SERPL-MCNC: 1.79 MG/DL (ref 1.6–2.4)
MCH RBC QN AUTO: 31.7 PG (ref 26–34)
MCHC RBC AUTO-ENTMCNC: 31.6 G/DL (ref 32–36)
MCV RBC AUTO: 100 FL (ref 80–100)
MONOCYTES # BLD AUTO: 1.31 X10*3/UL (ref 0.05–0.8)
MONOCYTES NFR BLD AUTO: 7.6 %
NEUTROPHILS # BLD AUTO: 10.12 X10*3/UL (ref 1.6–5.5)
NEUTROPHILS NFR BLD AUTO: 58.8 %
NON HDL CHOLESTEROL: 58 MG/DL (ref 0–149)
NRBC BLD-RTO: 0.1 /100 WBCS (ref 0–0)
PHOSPHATE SERPL-MCNC: 2.1 MG/DL (ref 2.5–4.9)
PHOSPHATE SERPL-MCNC: 4.9 MG/DL (ref 2.5–4.9)
PLATELET # BLD AUTO: 508 X10*3/UL (ref 150–450)
POTASSIUM SERPL-SCNC: 4 MMOL/L (ref 3.5–5.3)
POTASSIUM SERPL-SCNC: 4.2 MMOL/L (ref 3.5–5.3)
RBC # BLD AUTO: 2.43 X10*6/UL (ref 4–5.2)
SODIUM SERPL-SCNC: 138 MMOL/L (ref 136–145)
SODIUM SERPL-SCNC: 140 MMOL/L (ref 136–145)
TRIGL SERPL-MCNC: 167 MG/DL (ref 0–149)
VLDL: 33 MG/DL (ref 0–40)
WBC # BLD AUTO: 17.2 X10*3/UL (ref 4.4–11.3)

## 2024-11-15 PROCEDURE — 2500000005 HC RX 250 GENERAL PHARMACY W/O HCPCS: Performed by: NUTRITIONIST

## 2024-11-15 PROCEDURE — 2500000001 HC RX 250 WO HCPCS SELF ADMINISTERED DRUGS (ALT 637 FOR MEDICARE OP): Performed by: INTERNAL MEDICINE

## 2024-11-15 PROCEDURE — 2500000001 HC RX 250 WO HCPCS SELF ADMINISTERED DRUGS (ALT 637 FOR MEDICARE OP)

## 2024-11-15 PROCEDURE — 82947 ASSAY GLUCOSE BLOOD QUANT: CPT

## 2024-11-15 PROCEDURE — 36415 COLL VENOUS BLD VENIPUNCTURE: CPT | Performed by: NUTRITIONIST

## 2024-11-15 PROCEDURE — 99233 SBSQ HOSP IP/OBS HIGH 50: CPT | Performed by: NUTRITIONIST

## 2024-11-15 PROCEDURE — 83718 ASSAY OF LIPOPROTEIN: CPT | Performed by: NUTRITIONIST

## 2024-11-15 PROCEDURE — 85025 COMPLETE CBC W/AUTO DIFF WBC: CPT | Performed by: NUTRITIONIST

## 2024-11-15 PROCEDURE — 83735 ASSAY OF MAGNESIUM: CPT | Performed by: NUTRITIONIST

## 2024-11-15 PROCEDURE — 80069 RENAL FUNCTION PANEL: CPT | Performed by: NUTRITIONIST

## 2024-11-15 PROCEDURE — 2500000002 HC RX 250 W HCPCS SELF ADMINISTERED DRUGS (ALT 637 FOR MEDICARE OP, ALT 636 FOR OP/ED): Performed by: NUTRITIONIST

## 2024-11-15 PROCEDURE — 1200000002 HC GENERAL ROOM WITH TELEMETRY DAILY

## 2024-11-15 PROCEDURE — 2500000004 HC RX 250 GENERAL PHARMACY W/ HCPCS (ALT 636 FOR OP/ED)

## 2024-11-15 PROCEDURE — 2500000001 HC RX 250 WO HCPCS SELF ADMINISTERED DRUGS (ALT 637 FOR MEDICARE OP): Performed by: NUTRITIONIST

## 2024-11-15 PROCEDURE — 2500000004 HC RX 250 GENERAL PHARMACY W/ HCPCS (ALT 636 FOR OP/ED): Performed by: NUTRITIONIST

## 2024-11-15 PROCEDURE — 84100 ASSAY OF PHOSPHORUS: CPT | Performed by: NUTRITIONIST

## 2024-11-15 RX ORDER — MAGNESIUM CHLORIDE 64 MG
64 TABLET, DELAYED RELEASE (ENTERIC COATED) ORAL DAILY
Status: DISCONTINUED | OUTPATIENT
Start: 2024-11-15 | End: 2024-11-16

## 2024-11-15 RX ORDER — ASPIRIN 325 MG
50000 TABLET, DELAYED RELEASE (ENTERIC COATED) ORAL WEEKLY
Qty: 4 CAPSULE | Refills: 1 | Status: SHIPPED | OUTPATIENT
Start: 2024-11-20 | End: 2025-01-19

## 2024-11-15 RX ORDER — QUETIAPINE FUMARATE 25 MG/1
12.5 TABLET, FILM COATED ORAL NIGHTLY
Qty: 15 TABLET | Refills: 1 | Status: SHIPPED | OUTPATIENT
Start: 2024-11-15 | End: 2025-01-14

## 2024-11-15 RX ORDER — LANOLIN ALCOHOL/MO/W.PET/CERES
100 CREAM (GRAM) TOPICAL DAILY
Qty: 8 TABLET | Refills: 0 | Status: SHIPPED | OUTPATIENT
Start: 2024-11-16 | End: 2024-11-24

## 2024-11-15 RX ORDER — TRAMADOL HYDROCHLORIDE 25 MG/1
25 TABLET, COATED ORAL EVERY 8 HOURS
Qty: 30 TABLET | Refills: 0 | Status: CANCELLED | OUTPATIENT
Start: 2024-11-15

## 2024-11-15 RX ORDER — CYANOCOBALAMIN (VITAMIN B-12) 500 MCG
5 TABLET ORAL NIGHTLY
Qty: 70 TABLET | Refills: 1 | Status: CANCELLED | OUTPATIENT
Start: 2024-11-15

## 2024-11-15 RX ORDER — LANOLIN ALCOHOL/MO/W.PET/CERES
400 CREAM (GRAM) TOPICAL DAILY
Status: DISCONTINUED | OUTPATIENT
Start: 2024-11-15 | End: 2024-11-16 | Stop reason: HOSPADM

## 2024-11-15 RX ORDER — TRAMADOL HYDROCHLORIDE 25 MG/1
25 TABLET, COATED ORAL EVERY 8 HOURS
Qty: 10 TABLET | Refills: 0 | Status: SHIPPED | OUTPATIENT
Start: 2024-11-15

## 2024-11-15 RX ORDER — ACETAMINOPHEN 500 MG
5 TABLET ORAL NIGHTLY
Qty: 30 TABLET | Refills: 1 | Status: SHIPPED | OUTPATIENT
Start: 2024-11-15

## 2024-11-15 ASSESSMENT — PAIN DESCRIPTION - ORIENTATION: ORIENTATION: MID

## 2024-11-15 ASSESSMENT — COGNITIVE AND FUNCTIONAL STATUS - GENERAL
TURNING FROM BACK TO SIDE WHILE IN FLAT BAD: A LOT
WALKING IN HOSPITAL ROOM: TOTAL
PERSONAL GROOMING: TOTAL
TURNING FROM BACK TO SIDE WHILE IN FLAT BAD: A LOT
DRESSING REGULAR LOWER BODY CLOTHING: A LOT
EATING MEALS: TOTAL
MOVING TO AND FROM BED TO CHAIR: A LOT
PERSONAL GROOMING: A LOT
TOILETING: TOTAL
TOILETING: TOTAL
HELP NEEDED FOR BATHING: A LOT
DRESSING REGULAR UPPER BODY CLOTHING: A LOT
WALKING IN HOSPITAL ROOM: TOTAL
MOVING TO AND FROM BED TO CHAIR: A LOT
HELP NEEDED FOR BATHING: A LOT
DAILY ACTIVITIY SCORE: 10
DRESSING REGULAR LOWER BODY CLOTHING: A LOT
DRESSING REGULAR UPPER BODY CLOTHING: A LOT
CLIMB 3 TO 5 STEPS WITH RAILING: TOTAL
STANDING UP FROM CHAIR USING ARMS: TOTAL
DAILY ACTIVITIY SCORE: 9
MOBILITY SCORE: 9
EATING MEALS: TOTAL
MOVING FROM LYING ON BACK TO SITTING ON SIDE OF FLAT BED WITH BEDRAILS: A LOT
CLIMB 3 TO 5 STEPS WITH RAILING: TOTAL
MOBILITY SCORE: 9
MOVING FROM LYING ON BACK TO SITTING ON SIDE OF FLAT BED WITH BEDRAILS: A LOT
STANDING UP FROM CHAIR USING ARMS: TOTAL

## 2024-11-15 ASSESSMENT — PAIN SCALES - GENERAL
PAINLEVEL_OUTOF10: 5 - MODERATE PAIN
PAINLEVEL_OUTOF10: 0 - NO PAIN
PAINLEVEL_OUTOF10: 5 - MODERATE PAIN

## 2024-11-15 ASSESSMENT — PAIN DESCRIPTION - LOCATION: LOCATION: BACK

## 2024-11-15 ASSESSMENT — PAIN SCALES - WONG BAKER: WONGBAKER_NUMERICALRESPONSE: HURTS LITTLE MORE

## 2024-11-15 ASSESSMENT — PAIN - FUNCTIONAL ASSESSMENT: PAIN_FUNCTIONAL_ASSESSMENT: 0-10

## 2024-11-15 NOTE — PROGRESS NOTES
Wanda Booth is a 74 y.o. female on day 8 of admission presenting with FADIA (acute kidney injury) (CMS-[facility]ly [facility]).      Subjective   NAEON. This AM, pt is AAOx2. No complaints.  Updated son and  on medical status and potential discharge 11/15    Net +129 L +1 BM    Objective     Last Recorded Vitals  /75   Pulse 84   Temp 36.4 °C (97.5 °F)   Resp 15   Wt 53 kg (116 lb 13.5 oz)   SpO2 97%   Intake/Output last 3 Shifts:    Intake/Output Summary (Last 24 hours) at 11/15/2024 1438  Last data filed at 11/15/2024 0900  Gross per 24 hour   Intake 2112 ml   Output 1700 ml   Net 412 ml       Admission Weight  Weight: 72.6 kg (160 lb) (11/07/24 1206)    Daily Weight  11/08/24 : 53 kg (116 lb 13.5 oz)    Image Results  Electrocardiogram, 12-lead PRN ACS symptoms  Normal sinus rhythm  Inferior infarct , age undetermined  Abnormal ECG  When compared with ECG of 07-NOV-2024 20:02,  No significant change was found  Confirmed by Aki Vallejo ([id number]) on 11/13/2024 1:46:08 PM      Physical Exam  Constitutional:       General: She is not in acute distress.     Appearance: She is not diaphoretic.      Comments: Cachetic, NOT shivering   HENT:      Head: Normocephalic and atraumatic.      Nose: No rhinorrhea.      Mouth/Throat:      Comments: Improving light brown moist plaque on tongue. Poor dentition, only has a few teeth on the bottom low. Dry but improving    Eyes:      General: No scleral icterus.        Right eye: No discharge.         Left eye: No discharge.      Pupils: Pupils are equal, round, and reactive to light.   Cardiovascular:      Rate and Rhythm: Normal rate and regular rhythm.      Heart sounds: No murmur heard.     No friction rub. No gallop.   Pulmonary:      Effort: No respiratory distress.      Breath sounds: No wheezing, rhonchi or rales.   Chest:      Chest wall: No tenderness.   Abdominal:      General: Bowel sounds are normal. There is no distension.      Palpations: Abdomen is soft.       "Tenderness: There is abdominal tenderness.      Comments: PEG. Suprapubic pain   Genitourinary:     Comments: B/l neph tubes and a carreon   Musculoskeletal:         General: No tenderness.      Right lower leg: No edema.      Left lower leg: No edema.      Comments: ability to move all extremities and turn head    Skin:     General: Skin is warm and dry.      Coloration: Skin is not jaundiced.      Findings: No rash.   Neurological:      Mental Status: She is disoriented.      Comments: Oriented to person and sometimes location/situation.    Psychiatric:         Mood and Affect: Mood normal.       Relevant Results    Labs  Results from last 7 days   Lab Units 11/10/24  0755 11/09/24  1905 11/09/24  0937 11/08/24  1819 11/08/24  0612   SODIUM mmol/L 145 145 150* 148* 145   POTASSIUM mmol/L 3.3* 3.3* 3.6 4.0 4.0   CHLORIDE mmol/L 115* 115* 120* 118* 115*   CO2 mmol/L 22 21 22 19* 22   BUN mg/dL 30* 37* 39* 48* 53*   CREATININE mg/dL 1.87* 2.09* 2.23* 2.53* 2.81*   CALCIUM mg/dL 11.1* 11.7* 11.7* 12.9* 12.8*      Results from last 7 days   Lab Units 11/10/24  0754 11/09/24  2236 11/09/24  0937 11/08/24  0612 11/07/24  1449   HEMOGLOBIN g/dL 8.2* 7.8* 8.6* 9.4* 9.5*   WBC AUTO x10*3/uL 22.1* 20.2* 20.4* 19.3* 20.9*   PLATELETS AUTO x10*3/uL 376 366 367 271 336   HEMATOCRIT % 27.0* 25.1* 28.4* 33.1* 29.1*     Results from last 7 days   Lab Units 11/08/24  0612 11/07/24  1449   ALK PHOS U/L 79 102   BILIRUBIN TOTAL mg/dL 0.2 0.2   BILIRUBIN DIRECT mg/dL 0.2  --    PROTEIN TOTAL g/dL 6.1* 6.4   ALT U/L 13 16   AST U/L 21 26      Results from last 7 days   Lab Units 11/08/24  0612   PROTIME seconds 12.6   INR  1.1     No results found for: \"NONUHFIRE\", \"LACTATE\"    Results from last 7 days   Lab Units 11/10/24  1209 11/10/24  0755 11/10/24  0554 11/09/24  2330 11/09/24  1953 11/09/24  1905 11/09/24  1705 11/09/24  1203 11/09/24  0937 11/08/24  1819 11/08/24  1421 11/08/24  0612   POCT GLUCOSE mg/dL 89  --  123* 151* 170*  -- "  162*   < > 134*  --    < >  --    GLUCOSE mg/dL  --  100*  --   --   --  174*  --   --  132* 76  --  81    < > = values in this interval not displayed.         Relevant Results  Scheduled medications  acetaminophen, 1,000 mg, oral, q8h  cholecalciferol, 50,000 Units, oral, Weekly  heparin (porcine), 5,000 Units, subcutaneous, q8h  magnesium chloride, 64 mg, oral, Daily  melatonin, 5 mg, oral, Nightly  pantoprazole, 40 mg, intravenous, Daily  QUEtiapine, 12.5 mg, oral, Nightly  sodium phosphate, 30 mmol, intravenous, Once  thiamine, 100 mg, oral, Daily  traMADol, 25 mg, oral, q8h      Continuous medications       PRN medications  PRN medications: bisacodyl, polyethylene glycol      CTAP 11/7: IMPRESSION:  1. Severe right and moderate left hydroureteronephrosis. Right  percutaneous nephrostomy tube is in expected position with pigtail in  the right renal pelvis without evidence of discontinuity or kinking.  Comparison with prior outside imaging from 09/24/2024.  2. Marked hazy thickening of the urinary bladder wall with extensive  perivesical nodularity. These may represent collapsed urinary bladder  diverticula or perivesical soft tissue nodularity.  3. Few solid and few cavitary nodules throughout the bilateral lung  bases may relate to malignancy or atypical infectious process.  4. Heterogeneous appearance of the pancreatic body and tail with the  questionable ill-defined hypoattenuating area in the tail and  peripancreatic stranding. Findings can represent   sequela to known  acute pancreatitis. Underlying focal pancreatic mass can not be  excluded. Comparison with prior outside imaging from 09/24/2024 is  recommended.  5. Multiple enlarged pelvic and lower paraesophageal lymph nodes are  presumed metastatic or less likely reactive.  6. Small left and trace right pleural effusions.  7. PEG tube in place with bumper in the gastric body.    -PET Scan 11/11: IMPRESSION:  1. Hypermetabolic bilateral pulmonary  nodules, which could be  infectious versus metastatic.  2. Hypermetabolic pericardial nodule, right hilar and left  retrocrural, concerning for metastatic disease.  3. Nonspecific ill-defined focal hypermetabolic activity within the  pancreatic body, likely infective/inflammatory.  4. Hypermetabolic soft tissue implants adjacent to the uterus with  enlarged bilateral hypermetabolic pelvic lymph nodes consistent with  metastasis.    This patient has a urinary catheter   Reason for the urinary catheter remaining today? urinary retention/bladder outlet obstruction, acute or chronic      Assessment and Plan:  Ms. Wanda Booth is a 73 yo woman with PMHx b/l hydronephrosis (w/ r nephrostomy tube), chronic carreon, PEG, recently tx for FADIA/sepsis/acute pancreatitis at [facility] and completed course of cipro presenting to  ED 11/7 for recurrent falls and concern for UTI. Pt came from [facility]. In the ED pt found to have FADIA and hypercalcemia currently tx with fluids. Carreon replaced in ED and with concern for UTI blood cx and UA with cx ordered. Concern for limited drainage from neph so urology following CTAP with concern for maligancy with mets, and pancreatitis hx noted at [facility]. Pt with AMS per son seems to be for 1 month with more exacerbation in 1 week after [facility] hospital stay and 3 falls at SNF. CTH normal and CT cervical spine also wnl after fall alertness and orientation improving with fluids, tube feeds, antibiotics. Pt completed 5 day course (CTX 11/7 changed to zosyn 11/8, then back to CTX 11/10-11/12) with neg Ucx and blood cx. Cr normalized over course of stay from from 3.02 and 0.99.  Ca normalized 11/15  (corrected ca 11/15 10.7 on admission 11/7 14.8),  pamidronate given once 11/12. Ical ordered 11/16 for confirmation of resolution. Continuing to monitor fluid needs, no IVF 11/14 and FWF now 250 q6h as of 11/14. IR unable to obtain lymph node biopsy because of their risk concern with proximity to  vasculature but rec outpt attempt. Leukocytosis continues but slowly improving. Complex discharge needs pending plan with family for SNF and support, may consider geriatrics consult for extra support.      Dispo:  PT/OT rec SNF. Discussed dispo plan with son Colten and  Torsten, plan for pt to go to [facility] in [place], pt accepted and transport arranged for 11/16 AM. Daily family updates continue and most oupt appointments scheduled.       Updates:  - pt accepted to SNF and transport arranged for 11/16 AM.  - Ca normalized 11/15  (corrected ca 11/15 10.7 on admission 11/7 14.8), Ical ordered 11/16    -Continuing to monitor fluid needs, no IVF 11/14 and FWF now 250 q6h as of 11/14.  -Continue pain control   -bid rfp/mg, monitor for refeeding and replete as needed    -started mag delay 11/15       #Agitation, controlled   #Pain related to pulling out carreon, controlled   -Sitter discontinued 11/13  -traumatic carreon pull and replacement 11/8-11/9, blood clots improved with  flushing   Plan:  -continue Seroquel 12.5mg at bedtime 11/11 for sleep on agitation   -pain control with scheduled tylenol and tramadol, prn oxy as pt has limited ability to adequately ask for pain medication when needed        #AMS from infection vs #Hypercalcemic Encephalopathy, improved   #Dementia  -most likely hypercalcemic encephalopathy or infection related AMS in addition to dementia as pt mental status improving since admission with fluids/antibiotics   #Hypercalcemia, resolved  -Ca of 13.6 in ED  -CTH with no acute findings  -Son Colten endorses pt lived at home with  and could do all ADLs and communicate clearly 1 month ago (10/2024), [place], recalls for conversation with pt 4-5 days ago (11/4)  -s/p 2L NS in ED  -on tele, EKG 11/7 and 11/8 with no acute changes   -ical elevated, pth wnl, pthrp, HIV/Syph neg, TSH elevated, and thyroxine wnl, b12 400s wnl, vit d low 16  -PET Scan 11/11: metastatic nodules  in lungs, pericardial, near uterus, and in pelvic lymph nodes  -pthrp elevated   -Sitter discontinued 11/13  -discontinued telemetry 11/14  Plan:  -infectious and malignant workup as below   -oral hygiene support daily ordered 11/12  -continue thiamine  - pt accepted to SNF and transport arranged for 11/16 AM.  - Ca normalized 11/15  (corrected ca 11/15 10.7 on admission 11/7 14.8), Ical ordered 11/16   -Continuing to monitor fluid needs, no IVF 11/14 and FWF now 250 q6h as of 11/14.  -continue vd3    #Subacute cystitis  #UTI, improving  -pre[facility]ipro from [facility]  #Leukocytosis, improving   -wbc 20.9 in ED  #FADIA on CKD4, resolved  -Cr 3.02 on admission baseline 1.1 and elevated BUN 65 in ED   -free water deficient 0.3 L  #Chronic Carreon, stable  -exchanged 11/7 in ED  #Traumatic Carreon Replacement, improved  -11/8-11/9 initially had blood clots which have resolved   #B/L Nephrostomy Tube, stable  #B/L Hydronephrosis   -She has significant FADIA on CKD iso severe b/l hydronephrosis s/p R PCNT 10/3/24. -Bedside ultrasound showed b/l hydro in ED 11/7  -Labs prior to admission Ca 12.4 PTH 17 CKD4 based on Cystatin C of 1.98, baseline - -Pre-alb 9   -received 2L NS in ED  -CTAP very thick bladder wall with pelvic lymphadenopathy (1.3-1.6cm) and BL hydroureteronephrosis R>L despite  #Concern for  Malignancy with mets  -considering cervical vs bladder, continuing to investigate   -UA and cx negative, urine cytology 11/8 showed no malignant cells  -ionized ca high ,pth wnl, urine electrolytes wnl, folate wnl  -Utox neg, + UTI workup below, HIV neg. Syph neg. TSH high, b12 wnl, vit d low 16  - pthrp elevated  -Completed antibiotics (CTX)11/12 after 5 day course for UTI  -PET Scan 11/11: metastatic nodules in lungs, pericardial, near uterus, and in pelvic lymph nodes  -blood cx 11/7 final NGTD  Plan:  - Urology following  rec: Maintain B/L PCNT (11/8) and carreon catheter              - outpt cystoscopy   -Pain reg as  above  -agitated managed as above  -IR LN Biopsy attempted 11/12 (target is in a sub-optimal location due to adjacent vasculature. Risks outweigh benefits of proceeding at this time.), rec try again outpt    -continue vit d3  - Ca normalized 11/15  (corrected ca 11/15 10.7 on admission 11/7 14.8), Ical ordered 11/16    -Continuing to monitor fluid needs, no IVF 11/14 and FWF now 250 q6h as of 11/14.      #Pancreatitis  PET CT 11/11 showed inflammation/infection   -tx at [facility], evidence found on CTAP 11/7  Plan:  -ctm    #PEG  #Failure to thrive, improving  #Poor dentition, stable  #Normocytic Anemia, worsened  #Hypophosphatemia, improving   #Hypomagnesia, improving   - Malnourished, prealbumin 9/2024 9.0, now s/p PEG   - C/f underlying malignancy  -poor po intake requiring PEG at [facility] 10/2024  -hgb 9.5, mcv 95  -medical care prior to 2024 was in 2022  -7.7 11/11 from 8.2  -prealbumin low 13.9 11/7  Plan:  -nutrition rec: When appropriate, continue Isosource 1.5@ goal 45 ml/hr pending recs for bolus feeds, FWF 11/14 250 Q6H.   -type and screen ordered and consent on file  -transfuse if hgb 7 or less  -malignancy workup as above  -bid rfp/mg, monitor for refeeding and replete as needed    - Ca normalized 11/15  (corrected ca 11/15 10.7 on admission 11/7 14.8), Ical ordered 11/16    -Continuing to monitor fluid needs, no IVF 11/14 and FWF now 250 q6h as of 11/14.  -started mag delay 11/15      #Unspecified Recurrent Falls, stable  -CTHead and CTcervical spine wnl no acute findings  - Falls likely 2/2 hypovolemia iso poor PO intake, potential sepsis or concern for malignancy   -stopped tele 11/14 and discontinued sitter 11/13  Plan:  -ctm  -fall risk noted in orders, bed alarm needed and bracelet   -11/13 consider geriatrics consult for extra support   -dispo and appts as above    #Decubitus Ulcer  Plan:  -wound care orders updated for sacral wound 11/11    F FWF 250cc q6hr,   E prn K>4, Mg>2 phos>3, monitor refeeding  replete  N tube feeds  A PIV, PEG, [place], B/L Neph tube   DVT ppx subcutaneous hep  Bowel reg miralax prn     Code Status: FULL CODE (pt with/o capacity, confirmed status with son Colten)   NOK: Colten Booth (son) [phone], Torsten Booth () [phone]    Mariana Robertson MD

## 2024-11-15 NOTE — PROGRESS NOTES
Transitional Care Coordination Progress Note:  Patient discussed during interdisciplinary rounds.  Team members present: ANDREA MARTINEZ  Plan per Medical/Surgical team: Per medical team pt is medically ready (as of 11/14) for discharge pending precert to SNF.  Payer: [employer], [employer]  Status: Inpatient  Discharge disposition: [facility]   Potential Barriers: none  ADOD: 11/15  SW is following for placement, precert has been submitted by direct submit team and escalated. Care coordinator will continue to follow for discharge planning needs.     Tristian Michel RN  Transitional Care Coordinator (TCC)  [phone] or [phone]

## 2024-11-15 NOTE — CARE PLAN
Problem: Fall/Injury  Goal: Be free from injury by end of the shift  Outcome: Progressing     Problem: Skin  Goal: Prevent/manage excess moisture  Outcome: Progressing     Problem: Skin  Goal: Promote/optimize nutrition  Outcome: Progressing     Problem: Skin  Goal: Promote skin healing  Outcome: Progressing   The patient's goals for the shift include AME    The clinical goals for the shift include pt will remain safe during shift    Over the shift, the patient did not make progress toward the following goals. Barriers to progression include pt readiness and orientation. Recommendations to address these barriers include educating patient. Re orientating patient. Assess pain.

## 2024-11-15 NOTE — PROGRESS NOTES
Wanda Booth is a 74 y.o. female on day 8 of admission presenting with FADIA (acute kidney injury) (CMS-[facility]).    MIKA consulted on 11/11/24 to assist with dc planning.     MIKA called pt's spouse, Torsten, to inform him of pt's insurance coverage for SNF. SW informed Torsten that pt has 72 skilled days remaining, per SNF ([facility]). MIKA informed Torsten that we are waiting on insurance authorization for pt, Torsten understood.    UPDATE: MIKA informed that pt's auth is approved. MIKA arranged for transportation for 11/16/24 at 12:00 PM. MIKA informed pt's son, Colten, via text. MIKA called Torsten to inform him as well, and discussed IMM.    MIKA to follow.    Pt adod: 11/15  DC plan: SNF - [facility]     Dolores Bell MSW [facility]  Care Transitions  2  [phone] or Epic Secure Chat

## 2024-11-16 ENCOUNTER — TELEPHONE (OUTPATIENT)
Dept: PRIMARY CARE | Facility: CLINIC | Age: 74
End: 2024-11-16
Payer: COMMERCIAL

## 2024-11-16 VITALS
SYSTOLIC BLOOD PRESSURE: 151 MMHG | BODY MASS INDEX: 24.53 KG/M2 | HEIGHT: 58 IN | HEART RATE: 99 BPM | RESPIRATION RATE: 16 BRPM | TEMPERATURE: 97.7 F | DIASTOLIC BLOOD PRESSURE: 80 MMHG | WEIGHT: 116.84 LBS | OXYGEN SATURATION: 97 %

## 2024-11-16 LAB
ALBUMIN SERPL BCP-MCNC: 2 G/DL (ref 3.4–5)
ANION GAP SERPL CALC-SCNC: 12 MMOL/L (ref 10–20)
BASOPHILS # BLD AUTO: 0.12 X10*3/UL (ref 0–0.1)
BASOPHILS NFR BLD AUTO: 0.6 %
BUN SERPL-MCNC: 17 MG/DL (ref 6–23)
CA-I BLD-SCNC: 1.26 MMOL/L (ref 1.1–1.33)
CALCIUM SERPL-MCNC: 8.2 MG/DL (ref 8.6–10.6)
CHLORIDE SERPL-SCNC: 110 MMOL/L (ref 98–107)
CO2 SERPL-SCNC: 24 MMOL/L (ref 21–32)
CREAT SERPL-MCNC: 0.93 MG/DL (ref 0.5–1.05)
EGFRCR SERPLBLD CKD-EPI 2021: 65 ML/MIN/1.73M*2
EOSINOPHIL # BLD AUTO: 1.6 X10*3/UL (ref 0–0.4)
EOSINOPHIL NFR BLD AUTO: 7.7 %
ERYTHROCYTE [DISTWIDTH] IN BLOOD BY AUTOMATED COUNT: 18.6 % (ref 11.5–14.5)
GLUCOSE BLD MANUAL STRIP-MCNC: 135 MG/DL (ref 74–99)
GLUCOSE BLD MANUAL STRIP-MCNC: 140 MG/DL (ref 74–99)
GLUCOSE BLD MANUAL STRIP-MCNC: 176 MG/DL (ref 74–99)
GLUCOSE SERPL-MCNC: 126 MG/DL (ref 74–99)
HCT VFR BLD AUTO: 25.4 % (ref 36–46)
HGB BLD-MCNC: 7.8 G/DL (ref 12–16)
IMM GRANULOCYTES # BLD AUTO: 0.18 X10*3/UL (ref 0–0.5)
IMM GRANULOCYTES NFR BLD AUTO: 0.9 % (ref 0–0.9)
LYMPHOCYTES # BLD AUTO: 4.56 X10*3/UL (ref 0.8–3)
LYMPHOCYTES NFR BLD AUTO: 22 %
MAGNESIUM SERPL-MCNC: 1.79 MG/DL (ref 1.6–2.4)
MCH RBC QN AUTO: 31.1 PG (ref 26–34)
MCHC RBC AUTO-ENTMCNC: 30.7 G/DL (ref 32–36)
MCV RBC AUTO: 101 FL (ref 80–100)
MONOCYTES # BLD AUTO: 1.63 X10*3/UL (ref 0.05–0.8)
MONOCYTES NFR BLD AUTO: 7.9 %
NEUTROPHILS # BLD AUTO: 12.65 X10*3/UL (ref 1.6–5.5)
NEUTROPHILS NFR BLD AUTO: 60.9 %
NRBC BLD-RTO: 0 /100 WBCS (ref 0–0)
PHOSPHATE SERPL-MCNC: 3.7 MG/DL (ref 2.5–4.9)
PLATELET # BLD AUTO: 553 X10*3/UL (ref 150–450)
POTASSIUM SERPL-SCNC: 3.8 MMOL/L (ref 3.5–5.3)
RBC # BLD AUTO: 2.51 X10*6/UL (ref 4–5.2)
SODIUM SERPL-SCNC: 142 MMOL/L (ref 136–145)
WBC # BLD AUTO: 20.7 X10*3/UL (ref 4.4–11.3)

## 2024-11-16 PROCEDURE — 36415 COLL VENOUS BLD VENIPUNCTURE: CPT | Performed by: NUTRITIONIST

## 2024-11-16 PROCEDURE — 82947 ASSAY GLUCOSE BLOOD QUANT: CPT

## 2024-11-16 PROCEDURE — 2500000001 HC RX 250 WO HCPCS SELF ADMINISTERED DRUGS (ALT 637 FOR MEDICARE OP): Performed by: NUTRITIONIST

## 2024-11-16 PROCEDURE — 2500000004 HC RX 250 GENERAL PHARMACY W/ HCPCS (ALT 636 FOR OP/ED): Performed by: NUTRITIONIST

## 2024-11-16 PROCEDURE — 82330 ASSAY OF CALCIUM: CPT | Performed by: NUTRITIONIST

## 2024-11-16 PROCEDURE — 2500000002 HC RX 250 W HCPCS SELF ADMINISTERED DRUGS (ALT 637 FOR MEDICARE OP, ALT 636 FOR OP/ED)

## 2024-11-16 PROCEDURE — 2500000001 HC RX 250 WO HCPCS SELF ADMINISTERED DRUGS (ALT 637 FOR MEDICARE OP)

## 2024-11-16 PROCEDURE — 80069 RENAL FUNCTION PANEL: CPT | Performed by: NUTRITIONIST

## 2024-11-16 PROCEDURE — 85025 COMPLETE CBC W/AUTO DIFF WBC: CPT | Performed by: NUTRITIONIST

## 2024-11-16 PROCEDURE — 83735 ASSAY OF MAGNESIUM: CPT | Performed by: NUTRITIONIST

## 2024-11-16 RX ORDER — LORAZEPAM 2 MG/ML
0.5 INJECTION INTRAMUSCULAR ONCE
Status: CANCELLED | OUTPATIENT
Start: 2024-11-16

## 2024-11-16 RX ORDER — OLANZAPINE 2.5 MG/1
2.5 TABLET ORAL ONCE AS NEEDED
Status: COMPLETED | OUTPATIENT
Start: 2024-11-16 | End: 2024-11-16

## 2024-11-16 RX ORDER — OLANZAPINE 10 MG/2ML
2.5 INJECTION, POWDER, FOR SOLUTION INTRAMUSCULAR ONCE AS NEEDED
Status: COMPLETED | OUTPATIENT
Start: 2024-11-16 | End: 2024-11-16

## 2024-11-16 RX ORDER — ESOMEPRAZOLE MAGNESIUM 40 MG/1
40 GRANULE, DELAYED RELEASE ORAL
Qty: 30 PACKET | Refills: 1 | Status: SHIPPED | OUTPATIENT
Start: 2024-11-16 | End: 2025-01-15

## 2024-11-16 RX ORDER — QUETIAPINE FUMARATE 25 MG/1
12.5 TABLET, FILM COATED ORAL ONCE
Status: COMPLETED | OUTPATIENT
Start: 2024-11-16 | End: 2024-11-16

## 2024-11-16 ASSESSMENT — COGNITIVE AND FUNCTIONAL STATUS - GENERAL
DRESSING REGULAR LOWER BODY CLOTHING: A LOT
PERSONAL GROOMING: TOTAL
STANDING UP FROM CHAIR USING ARMS: TOTAL
EATING MEALS: TOTAL
HELP NEEDED FOR BATHING: A LOT
MOBILITY SCORE: 9
MOVING FROM LYING ON BACK TO SITTING ON SIDE OF FLAT BED WITH BEDRAILS: A LOT
WALKING IN HOSPITAL ROOM: TOTAL
DRESSING REGULAR UPPER BODY CLOTHING: A LOT
CLIMB 3 TO 5 STEPS WITH RAILING: TOTAL
TURNING FROM BACK TO SIDE WHILE IN FLAT BAD: A LOT
DAILY ACTIVITIY SCORE: 9
TOILETING: TOTAL
MOVING TO AND FROM BED TO CHAIR: A LOT

## 2024-11-16 ASSESSMENT — PAIN - FUNCTIONAL ASSESSMENT
PAIN_FUNCTIONAL_ASSESSMENT: 0-10
PAIN_FUNCTIONAL_ASSESSMENT: 0-10

## 2024-11-16 ASSESSMENT — PAIN SCALES - PAIN ASSESSMENT IN ADVANCED DEMENTIA (PAINAD)
BODYLANGUAGE: TENSE, DISTRESSED PACING, FIDGETING
FACIALEXPRESSION: SAD, FRIGHTENED, FROWN
TOTALSCORE: 4
TOTALSCORE: MEDICATION (SEE MAR)
CONSOLABILITY: DISTRACTED OR REASSURED BY VOICE/TOUCH
NEGVOCALIZATION: OCCASIONAL MOAN/GROAN, LOW SPEECH, NEGATIVE/DISAPPROVING QUALITY
BREATHING: NORMAL

## 2024-11-16 ASSESSMENT — PAIN SCALES - GENERAL: PAINLEVEL_OUTOF10: 0 - NO PAIN

## 2024-11-16 NOTE — CARE PLAN
Problem: Fall/Injury  Goal: Not fall by end of shift  Outcome: Progressing     Problem: Fall/Injury  Goal: Verbalize understanding of risk factor reduction measures to prevent injury from fall in the home  Outcome: Progressing     Problem: Skin  Goal: Prevent/manage excess moisture  Outcome: Progressing     Problem: Skin  Goal: Participates in plan/prevention/treatment measures  Outcome: Progressing     Problem: Skin  Goal: Promote/optimize nutrition  Outcome: Progressing   The patient's goals for the shift include AME    The clinical goals for the shift include pt will remain safe during shift    Over the shift, the patient did not make progress toward the following goals. Barriers to progression include pt orientation and understanding. Recommendations to address these barriers include reorient patient, turn every two hours, assess needs, bed alarm on.

## 2024-11-16 NOTE — NURSING NOTE
Discharge Note:    Patient was safe and stable prior to leaving. Report called and reported to Nurse Eaton! All belongings sent with patient and discharge packet sent with patient. Patient pulled out her carreon with this nurse attempted to reinsert a new one. MD made aware and urology notified and attempted to reinsert a new one and was unsuccessful. Per Medical team, okay for patient to discharge without carreon reinsertion. Nurse Eaton was called and back and updated. Patient was picked up by [facility] via stretcher.    Judith Wesley RN

## 2024-11-16 NOTE — PROGRESS NOTES
Discharge Transfer to Facility  Patient will discharge today to: SNF  Facility name: [facility]  Facility phone number: [phone]   [facility] [place] aware.  Bedside nurse (name) aware to call report: Judith  Phone number for report: [phone]   Ambulance transport has been arranged by MIKA Bernal on 11/15.  Ambulance Company name: [facility]  Ambulance Company phone number: [phone]   time: 12pm  MIKA updated family regarding discharge date/time on 11/15.  Patient has been approved for discharge after 8pm: n/a  Medical team and facility updated on transport time. Unit secretary provided with blue transport slip. MIKA completed 7000 in [facility]. Discharge summary, AVS, and [place] sent to facility via Careport. Care coordinator will continue to follow for discharge planning needs.    Tristian Michel RN  Transitional Care Coordinator (TCC)   [phone] or [phone]

## 2024-11-16 NOTE — DISCHARGE SUMMARY
Discharge Diagnosis  FADIA (acute kidney injury) (CMS-[facility])    Issues Requiring Follow-Up  -Please schedule an appointments with geriatrics to help with more compressive social and medical support.     -You have a primary care appointment scheduled with Dr. Bi Diamond Nov 22nd 11:15am, please arrive at 11am [facility]     - You have an oncology appointment scheduled with Dr. Damián Lopes at [facility] Nov 20th 9am     - You have an appointment scheduled with Interventional Radiology for right and left nephrostomy tube replacement Dec 27th 12pm (please arrive 11am) [facility]    - Please schedule an appointment with Interventional Radiology for lymph node biopsy     - You have an appointment scheduled at [facility] with urology on 11/26 at 1pm for follow up and cystoscopy     Discharge Meds     Medication List      START taking these medications     cholecalciferol 50,000 unit capsule; Commonly known as: Vitamin D-3;   Take 1 capsule (50,000 Units) by mouth 1 (one) time per week.; Start   taking on: November 20, 2024   esomeprazole 40 mg packet; Commonly known as: NexIUM; Take 40 mg by   mouth once daily in the morning. Take before meals.   QUEtiapine 25 mg tablet; Commonly known as: SEROquel; Take 0.5 tablets   (12.5 mg) by mouth once daily at bedtime.   sod phos di, mono-K phos mono tablet; Commonly known as: K Phos Neutral;   Take 1 tablet (250 mg) by mouth 2 times a day for 2 doses.   thiamine 100 mg tablet; Commonly known as: Vitamin B-1; Take 1 tablet   (100 mg) by mouth once daily for 8 doses.   traMADoL 25 mg tablet; Take 25 mg by mouth every 8 hours.     CHANGE how you take these medications     melatonin 5 mg tablet; Take 1 tablet (5 mg) by mouth once daily at   bedtime.; What changed: medication strength, how much to take     CONTINUE taking these medications     acetaminophen 325 mg tablet; Commonly known as: Tylenol   mirtazapine 7.5 mg tablet; Commonly known as: Remeron     STOP taking  these medications     ciprofloxacin 500 mg tablet; Commonly known as: Cipro   docusate sodium 100 mg capsule; Commonly known as: Colace   lactobacillus acidophilus capsule   metoprolol tartrate 25 mg tablet; Commonly known as: Lopressor   Protonix 40 mg EC tablet; Generic drug: pantoprazole   trospium 20 mg tablet; Commonly known as: Sanctura       Test Results Pending At Discharge  Pending Labs       No current pending labs.            Hospital Course  Hospital Course:    Ms. Wanda Booth is a 73 yo woman with PMHx b/l hydronephrosis (w/ r nephrostomy tube), chronic carreon, PEG, recently tx for FADIA/sepsis/acute pancreatitis at [facility] (treated with ciprofloxacin) presented from [facility] to  ED 11/7 for recurrent falls and concern for UTI.     Baseline per son who lives out of state is that pt has had increased confusion and AMS for 1 month prior to admission, though it was more exacerbated 1 week after discharge from [facility] to [facility] where she then experienced  3 falls.     For concern of AMS with somnolence and confusion, the pt  found to have FADIA (Cr 3.02) and hypercalcemia (Ca 14.8) so pt was tx with fluids and fadia resolved over the course of stay. Pt also treated with pamidronate 11/13, and calcium normalized by 11/15. Pt has a chronic carreon so in the ED carreon was replaced for concern of UTI which was confirmed with UA, though urine cx and blood cx were NGTD. Pt completed a 5 day course of antibiotics (Ceftriaxone 11/7 changed to Zosyn 11/8, then back to Ceftriaxone 11/10-11/12). Head CT and cervical spine were unremarkable. This hypercalcemia and chronic carreon status prompted further investigation into potential malignancy. AMS improved over the course of stay but revealed a possible long standing dementia that had been unaddressed in this pt. Pt presented with leukocytosis which is downtrended but not resolved which may be driven by malignancy.     For concern of malignancy, CTAP 11/7 showed a hazy  thickening of the urinary bladder wall with extensive perivesical nodularity; bilateral hydronephrosis, heterogeneous appearance of the pancreas, hypoattenuating body and tail - potentially sequela to known acute pancreatitis; and multiple enlarged pelvic and lower paraesophageal lymph nodes. These findings along with chronic carreon status and limited drainage from right nephrostomy tube prompted urology consultation. The r tube was replaced and a left tube was inserted with IR 11/7. A systems (carreon, r neph tube, l neph tube) flowed well after that point.    A PET scan was completed 11/11 that revealed:   1. Hypermetabolic bilateral pulmonary nodules (infectious x metastatic?)  2. Hypermetabolic pericardial nodule, right hilar and left retrocrural, concerning for metastatic disease.  3. Ill-defined focal hypermetabolic activity within the pancreatic body, likely infective/inflammatory.  4. Hypermetabolic soft tissue implants adjacent to the uterus with  enlarged bilateral hypermetabolic pelvic lymph nodes consistent with  metastasis.    Interventional Radiology was unable to perform pelvic lymph node biopsy inpt due to concern with proximity to vasculature, however they recommended attempting it again outpt.      Urology recommended close follow up outpt and cystoscopy outpt (investigate primary tumor location due to bladder wall thickening CTAP 11/7). Pt has appointment to follow up with [facility], because of sooner availability.     Pt presented malnourished and with signs of neglect (failure to thrive) with reduced appetite. Dieticians were consulted and advised on nutrition a regiment being 1.5 Isosource. Goal feeds of 45 ml/hr achieved by 11/14 with free water flushes of 250 ml/hr.     Lipid panel elevated on past tests, reordered 11/15, can consider outpt medical management if continues to be elevated.     Family (son and ) were made aware of likely malignancy. Patient improved over the course of stay and is  medically stable for discharge to SNF.       Medications:  -Changed Melatonin 1mg to 5mg nightly to help with sleep  -Started vitamin D once a week  -Started thiamine daily  -Start Seroquel nightly  -Start esomeprazole, STOP protonix  -Short term tramadol for pain as needed for pain  -Stop ciprofloxacin (antibiotic)  -Stop colace   -Stop lopressor, because blood pressure has been well controlled  -Stop probiotics (lactobacillus)  -Stop trospium (consider restarting if bladder spasms reoccur)  -Start sodium phosate     Follow up:  -Please schedule an appointments with geriatrics to help with more compressive social and medical support.     -You have a primary care appointment scheduled with Dr. Bi Diamond Nov 22nd 11:15am, please arrive at 11am [facility]     - You have an oncology appointment scheduled with Dr. Damián Lopes at [facility] Nov 20th 9am     - You have an appointment scheduled with Interventional Radiology for right and left nephrostomy tube replacement Dec 27th 12pm (please arrive 11am) [facility]    - Please schedule an appointment with Interventional Radiology for lymph node biopsy     - You have an appointment scheduled at [facility] with urology on 11/26 at 1pm for follow up and cystoscopy           Pertinent Physical Exam At Time of Discharge  Physical Exam  Constitutional:       General: She is not in acute distress.     Appearance: She is not ill-appearing or diaphoretic.   HENT:      Head: Normocephalic and atraumatic.      Nose: No congestion.      Mouth/Throat:      Mouth: Mucous membranes are dry.   Eyes:      General:         Right eye: No discharge.         Left eye: No discharge.      Extraocular Movements: Extraocular movements intact.      Conjunctiva/sclera: Conjunctivae normal.      Pupils: Pupils are equal, round, and reactive to light.   Cardiovascular:      Rate and Rhythm: Normal rate and regular rhythm.      Heart sounds: No murmur heard.     No friction rub. No  gallop.   Pulmonary:      Effort: Pulmonary effort is normal. No respiratory distress.      Breath sounds: No wheezing or rales.   Chest:      Chest wall: No tenderness.   Abdominal:      General: Bowel sounds are normal. There is no distension.      Palpations: Abdomen is soft.      Tenderness: There is abdominal tenderness. There is no guarding or rebound.      Comments: Generalized lower abd pain on deep palpation   Genitourinary:     Comments: Calvillo in place  Musculoskeletal:         General: No swelling or tenderness.      Right lower leg: No edema.      Left lower leg: No edema.   Skin:     General: Skin is warm and dry.      Findings: No rash.   Neurological:      Motor: Weakness present.      Comments: A and o x2   Psychiatric:         Mood and Affect: Mood normal.         Outpatient Follow-Up  Future Appointments   Date Time Provider Department Center   11/22/2024 11:15 AM Bi Diamond MD [id number] [facility]   12/27/2024 12:00 PM [facility] IR 1 [id number]   1/13/2025  2:00 PM Christopher D'Amico, DO [id number] [facility]         Mariana Robertson MD

## 2024-11-16 NOTE — NURSING NOTE
Discharge Note:  This RN went over discharge instructions with patient, patient is confused and did not understand. IV's taken out intact. Patient has all belongings which includes a sweater and pants. Patient did not bring shoes, cell phone or cell phone  to the hospital. [facility] to pick patient up via stretcher at 12pm on 11/16/24 . Disposition is [facility].    Nadia Gimenez RN

## 2024-11-20 ENCOUNTER — APPOINTMENT (OUTPATIENT)
Dept: HEMATOLOGY/ONCOLOGY | Facility: HOSPITAL | Age: 74
End: 2024-11-20
Payer: COMMERCIAL

## 2024-11-21 ENCOUNTER — APPOINTMENT (OUTPATIENT)
Dept: UROLOGY | Facility: CLINIC | Age: 74
End: 2024-11-21
Payer: COMMERCIAL

## 2024-11-22 ENCOUNTER — APPOINTMENT (OUTPATIENT)
Dept: PRIMARY CARE | Facility: CLINIC | Age: 74
End: 2024-11-22
Payer: COMMERCIAL

## 2024-11-22 NOTE — DOCUMENTATION CLARIFICATION NOTE
"    PATIENT:               SAHARA EATON  ACCT #:                  [id number]  MRN:                       [id number]  :                       1950  ADMIT DATE:       2024 11:55 AM  DISCH DATE:        2024 5:37 PM  RESPONDING PROVIDER #:        [id number]          PROVIDER RESPONSE TEXT:    FADIA and Hydronephrosis is not a complication of the nephrostomy tube    CDI QUERY TEXT:    Clarification      Instruction:    Based on your assessment of the patient and the clinical information, please provide the requested documentation by clicking on the appropriate radio button and enter any additional information if prompted.    Question: Please further clarify the relationship between the FADIA, hydronephrosis and the urinary device    When answering this query, please exercise your independent professional judgment. The fact that a question is being asked, does not imply that any particular answer is desired or expected.    The patient's clinical indicators include:  Clinical Information: 74yr old female with right nephrostomy tube and chronic carreon admitted for concern for UTI,  hydronephrosis, and FADIA per documentation in 24 H&P    Clinical Indicators:  24 H&P- \" Concern for limited drainage from neph so urology consulted\"    24 Urology Consult \"Her R PCNT was flushed and start draining likely it was clogged\"    24 DPN- \"She has significant FADIA on CKD iso severe b/l hydronephrosis s/p R PCNT 10/3/24. -Bedside ultrasound showed b/l hydro in ED \" and \"CTAP very thick bladder wall with pelvic lymphadenopathy (1.3-1.6cm) and BL hydroureteronephrosis R>L\"    Treatment: Right nephrostomy tube exchange 24    Risk Factors: Nephrostomy tube, FADIA, hydronephrosis  Options provided:  -- FADIA and Hydronephrosis is a complication of the nephrostomy tube  -- FADIA and Hydronephrosis is not a complication of the nephrostomy tube  -- Other - I will add my own diagnosis  -- Refer to Clinical " Documentation Reviewer    Query created by: Uma Hitchcock on 11/20/2024 12:39 PM      Electronically signed by:  DANIEL NRU MD 11/22/2024 8:35 AM

## 2024-12-16 ENCOUNTER — APPOINTMENT (OUTPATIENT)
Dept: UROLOGY | Facility: HOSPITAL | Age: 74
End: 2024-12-16
Payer: COMMERCIAL

## 2024-12-27 ENCOUNTER — APPOINTMENT (OUTPATIENT)
Dept: RADIOLOGY | Facility: HOSPITAL | Age: 74
End: 2024-12-27
Payer: COMMERCIAL

## 2024-12-30 ENCOUNTER — APPOINTMENT (OUTPATIENT)
Dept: PRIMARY CARE | Facility: CLINIC | Age: 74
End: 2024-12-30
Payer: COMMERCIAL

## 2025-01-13 ENCOUNTER — APPOINTMENT (OUTPATIENT)
Dept: PRIMARY CARE | Facility: CLINIC | Age: 75
End: 2025-01-13
Payer: COMMERCIAL